# Patient Record
Sex: FEMALE | Race: OTHER | Employment: OTHER | URBAN - METROPOLITAN AREA
[De-identification: names, ages, dates, MRNs, and addresses within clinical notes are randomized per-mention and may not be internally consistent; named-entity substitution may affect disease eponyms.]

---

## 2017-10-16 ENCOUNTER — OFFICE VISIT (OUTPATIENT)
Dept: OBGYN CLINIC | Age: 62
End: 2017-10-16

## 2017-10-16 VITALS
SYSTOLIC BLOOD PRESSURE: 128 MMHG | BODY MASS INDEX: 26.33 KG/M2 | HEIGHT: 65 IN | WEIGHT: 158 LBS | DIASTOLIC BLOOD PRESSURE: 72 MMHG

## 2017-10-16 DIAGNOSIS — Z01.411 ENCOUNTER FOR GYNECOLOGICAL EXAMINATION (GENERAL) (ROUTINE) WITH ABNORMAL FINDINGS: Primary | ICD-10-CM

## 2017-10-16 DIAGNOSIS — N63.10 LUMP OF RIGHT BREAST: ICD-10-CM

## 2017-10-16 NOTE — PATIENT INSTRUCTIONS
Well Visit, Women 48 to 72: Care Instructions  Your Care Instructions  Physical exams can help you stay healthy. Your doctor has checked your overall health and may have suggested ways to take good care of yourself. He or she also may have recommended tests. At home, you can help prevent illness with healthy eating, regular exercise, and other steps. Follow-up care is a key part of your treatment and safety. Be sure to make and go to all appointments, and call your doctor if you are having problems. It's also a good idea to know your test results and keep a list of the medicines you take. How can you care for yourself at home? · Reach and stay at a healthy weight. This will lower your risk for many problems, such as obesity, diabetes, heart disease, and high blood pressure. · Get at least 30 minutes of exercise on most days of the week. Walking is a good choice. You also may want to do other activities, such as running, swimming, cycling, or playing tennis or team sports. · Do not smoke. Smoking can make health problems worse. If you need help quitting, talk to your doctor about stop-smoking programs and medicines. These can increase your chances of quitting for good. · Protect your skin from too much sun. When you're outdoors from 10 a.m. to 4 p.m., stay in the shade or cover up with clothing and a hat with a wide brim. Wear sunglasses that block UV rays. Even when it's cloudy, put broad-spectrum sunscreen (SPF 30 or higher) on any exposed skin. · See a dentist one or two times a year for checkups and to have your teeth cleaned. · Wear a seat belt in the car. · Limit alcohol to 1 drink a day. Too much alcohol can cause health problems. Follow your doctor's advice about when to have certain tests. These tests can spot problems early. · Cholesterol.  Your doctor will tell you how often to have this done based on your age, family history, or other things that can increase your risk for heart attack and stroke. · Blood pressure. Have your blood pressure checked during a routine doctor visit. Your doctor will tell you how often to check your blood pressure based on your age, your blood pressure results, and other factors. · Mammogram. Ask your doctor how often you should have a mammogram, which is an X-ray of your breasts. A mammogram can spot breast cancer before it can be felt and when it is easiest to treat. · Pap test and pelvic exam. Ask your doctor how often you should have a Pap test. You may not need to have a Pap test as often as you used to. · Vision. Have your eyes checked every year or two or as often as your doctor suggests. Some experts recommend that you have yearly exams for glaucoma and other age-related eye problems starting at age 48. · Hearing. Tell your doctor if you notice any change in your hearing. You can have tests to find out how well you hear. · Diabetes. Ask your doctor whether you should have tests for diabetes. · Colon cancer. You should begin tests for colon cancer at age 48. You may have one of several tests. Your doctor will tell you how often to have tests based on your age and risk. Risks include whether you already had a precancerous polyp removed from your colon or whether your parents, sisters and brothers, or children have had colon cancer. · Thyroid disease. Talk to your doctor about whether to have your thyroid checked as part of a regular physical exam. Women have an increased chance of a thyroid problem. · Osteoporosis. You should begin tests for bone density at age 72. If you are younger than 72, ask your doctor whether you have factors that may increase your risk for this disease. You may want to have this test before age 72. · Heart attack and stroke risk. At least every 4 to 6 years, you should have your risk for heart attack and stroke assessed.  Your doctor uses factors such as your age, blood pressure, cholesterol, and whether you smoke or have diabetes to show what your risk for a heart attack or stroke is over the next 10 years. When should you call for help? Watch closely for changes in your health, and be sure to contact your doctor if you have any problems or symptoms that concern you. Where can you learn more? Go to http://emma-renetta.info/. Enter R393 in the search box to learn more about \"Well Visit, Women 50 to 72: Care Instructions. \"  Current as of: July 19, 2016  Content Version: 11.3  © 3338-3944 [employer]. Care instructions adapted under license by [employer] (which disclaims liability or warranty for this information). If you have questions about a medical condition or this instruction, always ask your healthcare professional. [address] any warranty or liability for your use of this information.

## 2017-10-16 NOTE — PROGRESS NOTES
[facility]  [email or web address]  [phone]    Cong Dennis MD, [facility]       Annual Gynecologic Exam:   WWE 60+  Chief Complaint   Patient presents with    Well Woman         Wandy yLle is a 58 y.o. No obstetric history on file. BLACK OR   female who presents for an annual exam.    She does report additional concerns today. Patient states that she felt a right breast lump a few months ago, but has not felt anything since then. Sexual history and Contraception:  History   Sexual Activity    Sexual activity: Yes    Partners: Male    Birth control/ protection: Surgical     She does not reports new sexual partner(s) in the last year. Preventive Medicine History:  Her last annual GYN exam was about one year ago. Her most recent Pap smear result: normal was obtained in October 2015. Her most recent HR HPV screen was Negative obtained 2 year(s) ago. She does not have a history of STEVEN 2, 3 or cervical cancer. Breast health:  Last mammogram: approximate date 10/13/16 and was normal.   A mammogram was scheduled for today. Breast cancer family updated: see . Bone health: Angelica Miranda She has not had a bone density scan in the past.   Last bone density test results: Patient has never had a BDS. She does not have a history of osteopenia/osteoporosis. Osteoporosis family history updated: see .      Past Medical History:   Diagnosis Date    Bilateral dry eyes     Herpes simplex     Includes herpes labialis    History of mammogram 10/13/2016    negative    Hx of mammogram 09/30/14; 10/1/15    normal; negative    Hypertension     Hypertension     Pap smear for cervical cancer screening 9/14/10; 9/18/12; 10/1/15    Negative, HPV negative; Negative; Negative, HPV negative     Past Surgical History:   Procedure Laterality Date    HX COLONOSCOPY  2006    Wnl    HX GYN      tubal ligation    LAP,TUBAL CAUTERY       Family History   Problem Relation Age of Onset    Hypertension Mother     Arthritis-osteo Mother     Osteoporosis Mother     Hypertension Father     Hypertension Sister    Abilio Galaviz Arthritis-osteo Sister     Hypertension Brother      Social History     Social History    Marital status:      Spouse name: N/A    Number of children: N/A    Years of education: N/A     Occupational History    Not on file. Social History Main Topics    Smoking status: Never Smoker    Smokeless tobacco: Never Used    Alcohol use Yes      Comment: occasional wine    Drug use: No    Sexual activity: Yes     Partners: Male     Birth control/ protection: Surgical     Other Topics Concern    Not on file     Social History Narrative       Allergies   Allergen Reactions    Iodine Unknown (comments)       Current Outpatient Prescriptions   Medication Sig    candesartan-hydrochlorothiazide (ATACAND HCT) 16-12.5 mg per tablet Take 1 Tab by mouth daily. No current facility-administered medications for this visit. There is no problem list on file for this patient.       Review of Systems - History obtained from the patient  Constitutional: negative for weight loss, fever, night sweats  HEENT: negative for hearing loss, earache, congestion, snoring, sorethroat  CV: negative for chest pain, palpitations, edema  Resp: negative for cough, shortness of breath, wheezing  GI: negative for change in bowel habits, abdominal pain, black or bloody stools  : negative for frequency, dysuria, hematuria, vaginal discharge  MSK: negative for back pain, joint pain, muscle pain  Breast: see HPI  Skin :negative for itching, rash, hives  Neuro: negative for dizziness, headache, confusion, weakness  Psych: negative for anxiety, depression, change in mood  Heme/lymph: negative for bleeding, bruising, pallor    Physical Exam  Visit Vitals    /72    Ht 5' 5\" (1.651 m)    Wt 158 lb (71.7 kg)    BMI 26.29 kg/m2       Constitutional  · Appearance: well-nourished, well developed, alert, in no acute distress    HENT  · Head and Face: appears normal    Neck  · Inspection/Palpation: normal appearance, no masses or tenderness  · Lymph Nodes: no lymphadenopathy present  · Thyroid: gland size normal, nontender, no nodules or masses present on palpation    Chest  · Respiratory Effort: breathing unlabored  · Auscultation: normal breath sounds    Cardiovascular  · Heart:  · Auscultation: regular rate and rhythm without murmur    Breasts  · Inspection of Breasts: breasts symmetrical, no skin changes, no discharge present, nipple appearance normal, no skin retraction present  · Palpation of Breasts and Axillae:  Left: no masses present on palpation, no breast tenderness Right: small cystic mobile, nt masses, see image    ·   ·   · Axillary Lymph Nodes: no lymphadenopathy present    Gastrointestinal  · Abdominal Examination: abdomen non-tender to palpation, normal bowel sounds, no masses present  · Liver and spleen: no hepatomegaly present, spleen not palpable  · Hernias: no hernias identified    Genitourinary  · External Genitalia: normal appearance for age, no discharge present, no tenderness present, no inflammatory lesions present, no masses present, mild  atrophy present  · Vagina: normal vaginal vault without central or paravaginal defects, no discharge present, no inflammatory lesions present, no masses present  · Bladder: non-tender to palpation  · Urethra: appears normal  · Cervix: normal   · Uterus: normal size, shape and consistency  · Adnexa: no adnexal tenderness present, no adnexal masses present  · Perineum: perineum within normal limits, no evidence of trauma, no rashes or skin lesions present  · Anus: anus within normal limits, no hemorrhoids present  · Inguinal Lymph Nodes: no lymphadenopathy present    Skin  · General Inspection: no rash, no lesions identified    Neurologic/Psychiatric  · Mental Status:  · Orientation: grossly oriented to person, place and time  · Mood and Affect: mood normal, affect appropriate    Assessment:  58 y.o. No obstetric history on file. for well woman exam  Encounter Diagnoses   Name Primary?  Lump of right breast     Encounter for gynecological examination (general) (routine) with abnormal findings Yes       Plan:  The patient was counseled about diet, exercise, healthy lifestyle  We discussed current self breast exam and mammogram recommendations  We discussed current pap smear and HR HPV testing guidelines  We recommend follow up in one year for routine annual gynecologic exam or sooner if needed  We recommend follow up with a primary care physician for any chronic medical problems or non-gynecologic concerns    We discussed calcium/vitamin D/weight bearing exercise and osteoporosis prevention  Handouts were given to the patient   Disc breast fu, reviewed findings and plan with pt    [place] up:  [x] return for annual well woman exam in one year or sooner if she is having problems  [] follow up and ultrasound  [] mammogram  [] 6 months  [] 6 weeks   []     Orders Placed This Encounter    REFERRAL TO BREAST SURGERY       No results found for any visits on 10/16/17.

## 2017-10-16 NOTE — MR AVS SNAPSHOT
Visit Information Date & Time Provider Department Dept. Phone Encounter #  
 10/16/2017  3:20 PM Sybil Huerta MD [facility]  Upcoming Health Maintenance Date Due Hepatitis C Screening 1955 FOBT Q 1 YEAR AGE 50-75 7/21/2005 ZOSTER VACCINE AGE 60> 5/21/2015 INFLUENZA AGE 9 TO ADULT 8/1/2017 BREAST CANCER SCRN MAMMOGRAM 10/13/2017 PAP AKA CERVICAL CYTOLOGY 10/1/2018 Allergies as of 10/16/2017  Review Complete On: 10/16/2017 By: Vianney Chavez LPN Severity Noted Reaction Type Reactions Iodine  05/23/2013    Unknown (comments) Current Immunizations  Never Reviewed No immunizations on file. Not reviewed this visit Vitals BP Height(growth percentile) Weight(growth percentile) BMI OB Status Smoking Status 128/72 5' 5\" (1.651 m) 158 lb (71.7 kg) 26.29 kg/m2 Postmenopausal Never Smoker BMI and BSA Data Body Mass Index Body Surface Area  
 26.29 kg/m 2 1.81 m 2 Your Updated Medication List  
  
   
This list is accurate as of: 10/16/17  3:58 PM.  Always use your most recent med list.  
  
  
  
  
 ATACAND HCT 16-12.5 mg per tablet Generic drug:  candesartan-hydroCHLOROthiazide Take 1 Tab by mouth daily. RESTASIS 0.05 % ophthalmic emulsion Generic drug:  cycloSPORINE Administer 1 Drop to both eyes two (2) times a day. Patient Instructions Well Visit, Women 48 to 72: Care Instructions Your Care Instructions Physical exams can help you stay healthy. Your doctor has checked your overall health and may have suggested ways to take good care of yourself. He or she also may have recommended tests. At home, you can help prevent illness with healthy eating, regular exercise, and other steps. Follow-up care is a key part of your treatment and safety.  Be sure to make and go to all appointments, and call your doctor if you are having problems. It's also a good idea to know your test results and keep a list of the medicines you take. How can you care for yourself at home? · Reach and stay at a healthy weight. This will lower your risk for many problems, such as obesity, diabetes, heart disease, and high blood pressure. · Get at least 30 minutes of exercise on most days of the week. Walking is a good choice. You also may want to do other activities, such as running, swimming, cycling, or playing tennis or team sports. · Do not smoke. Smoking can make health problems worse. If you need help quitting, talk to your doctor about stop-smoking programs and medicines. These can increase your chances of quitting for good. · Protect your skin from too much sun. When you're outdoors from 10 a.m. to 4 p.m., stay in the shade or cover up with clothing and a hat with a wide brim. Wear sunglasses that block UV rays. Even when it's cloudy, put broad-spectrum sunscreen (SPF 30 or higher) on any exposed skin. · See a dentist one or two times a year for checkups and to have your teeth cleaned. · Wear a seat belt in the car. · Limit alcohol to 1 drink a day. Too much alcohol can cause health problems. Follow your doctor's advice about when to have certain tests. These tests can spot problems early. · Cholesterol. Your doctor will tell you how often to have this done based on your age, family history, or other things that can increase your risk for heart attack and stroke. · Blood pressure. Have your blood pressure checked during a routine doctor visit. Your doctor will tell you how often to check your blood pressure based on your age, your blood pressure results, and other factors. · Mammogram. Ask your doctor how often you should have a mammogram, which is an X-ray of your breasts. A mammogram can spot breast cancer before it can be felt and when it is easiest to treat.  
· Pap test and pelvic exam. Ask your doctor how often you should have a Pap test. You may not need to have a Pap test as often as you used to. · Vision. Have your eyes checked every year or two or as often as your doctor suggests. Some experts recommend that you have yearly exams for glaucoma and other age-related eye problems starting at age 48. · Hearing. Tell your doctor if you notice any change in your hearing. You can have tests to find out how well you hear. · Diabetes. Ask your doctor whether you should have tests for diabetes. · Colon cancer. You should begin tests for colon cancer at age 48. You may have one of several tests. Your doctor will tell you how often to have tests based on your age and risk. Risks include whether you already had a precancerous polyp removed from your colon or whether your parents, sisters and brothers, or children have had colon cancer. · Thyroid disease. Talk to your doctor about whether to have your thyroid checked as part of a regular physical exam. Women have an increased chance of a thyroid problem. · Osteoporosis. You should begin tests for bone density at age 72. If you are younger than 72, ask your doctor whether you have factors that may increase your risk for this disease. You may want to have this test before age 72. · Heart attack and stroke risk. At least every 4 to 6 years, you should have your risk for heart attack and stroke assessed. Your doctor uses factors such as your age, blood pressure, cholesterol, and whether you smoke or have diabetes to show what your risk for a heart attack or stroke is over the next 10 years. When should you call for help? Watch closely for changes in your health, and be sure to contact your doctor if you have any problems or symptoms that concern you. Where can you learn more? Go to http://emma-renetta.info/. Enter E019 in the search box to learn more about \"Well Visit, Women 50 to 72: Care Instructions. \" Current as of: July 19, 2016 Content Version: 11.3 © 0205-6963 [employer]wise, Incorporated. Care instructions adapted under license by [employer] (which disclaims liability or warranty for this information). If you have questions about a medical condition or this instruction, always ask your healthcare professional. [address] any warranty or liability for your use of this information. Introducing hospitals & HEALTH SERVICES! [employer] introduces [employer] patient portal. Now you can access parts of your medical record, email your doctor's office, and request medication refills online. 1. In your internet browser, go to [email or web address] 2. Click on the First Time User? Click Here link in the Sign In box. You will see the New Member Sign Up page. 3. Enter your [employer] Access Code exactly as it appears below. You will not need to use this code after youve completed the sign-up process. If you do not sign up before the expiration date, you must request a new code. · [employer] Access Code: [email or web address] Expires: 1/14/2018  3:58 PM 
 
4. Enter the last four digits of your Social Security Number (xxxx) and Date of Birth (mm/dd/yyyy) as indicated and click Submit. You will be taken to the next sign-up page. 5. Create a [employer] ID. This will be your [employer] login ID and cannot be changed, so think of one that is secure and easy to remember. 6. Create a [employer] password. You can change your password at any time. 7. Enter your Password Reset Question and Answer. This can be used at a later time if you forget your password. 8. Enter your e-mail address. You will receive e-mail notification when new information is available in [address] 9. Click Sign Up. You can now view and download portions of your medical record. 10. Click the Download Summary menu link to download a portable copy of your medical information.  
 
If you have questions, please visit the Frequently Asked Questions section of the [employer]. Remember, [employer] is NOT to be used for urgent needs. For medical emergencies, dial 911. Now available from your iPhone and Android! Please provide this summary of care documentation to your next provider. Your primary care clinician is listed as [address]. If you have any questions after today's visit, please call [phone].

## 2017-10-24 DIAGNOSIS — Z12.39 SCREENING BREAST EXAMINATION: Primary | ICD-10-CM

## 2017-10-31 ENCOUNTER — HOSPITAL ENCOUNTER (OUTPATIENT)
Dept: MAMMOGRAPHY | Age: 62
Discharge: HOME | End: 2017-10-31
Attending: SURGERY
Payer: OTHER GOVERNMENT

## 2017-10-31 ENCOUNTER — OFFICE VISIT (OUTPATIENT)
Dept: SURGERY | Age: 62
End: 2017-10-31

## 2017-10-31 VITALS
DIASTOLIC BLOOD PRESSURE: 73 MMHG | SYSTOLIC BLOOD PRESSURE: 125 MMHG | HEART RATE: 63 BPM | HEIGHT: 65 IN | BODY MASS INDEX: 26.57 KG/M2 | WEIGHT: 159.5 LBS

## 2017-10-31 DIAGNOSIS — Z12.39 SCREENING BREAST EXAMINATION: ICD-10-CM

## 2017-10-31 DIAGNOSIS — N63.20 BREAST MASS, LEFT: Primary | ICD-10-CM

## 2017-10-31 PROCEDURE — 77063 BREAST TOMOSYNTHESIS BI: CPT

## 2017-10-31 RX ORDER — TRIAMTERENE AND HYDROCHLOROTHIAZIDE 37.5; 25 MG/1; MG/1
CAPSULE ORAL DAILY
COMMUNITY
End: 2018-11-13 | Stop reason: SDUPTHER

## 2017-10-31 NOTE — MR AVS SNAPSHOT
Visit Information Date & Time Provider Department Dept. Phone Encounter #  
 10/31/2017  9:00 AM Vernon Arora [address] [facility] [phone] [id number] Upcoming Health Maintenance Date Due Hepatitis C Screening 1955 DTaP/Tdap/Td series (1 - Tdap) 7/21/1976 FOBT Q 1 YEAR AGE 50-75 7/21/2005 ZOSTER VACCINE AGE 60> 5/21/2015 INFLUENZA AGE 9 TO ADULT 8/1/2017 BREAST CANCER SCRN MAMMOGRAM 10/13/2017 PAP AKA CERVICAL CYTOLOGY 10/1/2018 Allergies as of 10/31/2017  Review Complete On: 10/31/2017 By: Vernon Arora MD  
  
 Severity Noted Reaction Type Reactions Iodine  05/23/2013    Unknown (comments) Sulfa (Sulfonamide Antibiotics)  10/31/2017    Unknown (comments) Current Immunizations  Never Reviewed No immunizations on file. Not reviewed this visit Vitals BP Pulse Height(growth percentile) Weight(growth percentile) BMI OB Status 125/73 63 5' 5\" (1.651 m) 159 lb 8 oz (72.3 kg) 26.54 kg/m2 Postmenopausal  
 Smoking Status Never Smoker Vitals History BMI and BSA Data Body Mass Index Body Surface Area  
 26.54 kg/m 2 1.82 m 2 Preferred Pharmacy Pharmacy Name Phone [facility] [address], [place] [address] [place] [address] [phone] Your Updated Medication List  
  
   
This list is accurate as of: 10/31/17  4:14 PM.  Always use your most recent med list.  
  
  
  
  
 triamterene-hydroCHLOROthiazide 37.5-25 mg per capsule Commonly known as:  Khadijah Root Take  by mouth daily. Patient Instructions Breast Lumps: Care Instructions Your Care Instructions Breast lumps are common, especially in women between ages 27 and 48. Many women's breasts feel lumpy and tender before their menstrual period. Women also may have lumps when they are breastfeeding.  Breast lumps may go away after menopause. All new breast lumps in women after menopause should be checked by a doctor. Although lumps may be normal for you, it is important to have your doctor check any lump or thickness that is not like the rest of your breast to make sure it is not cancer. A lump may be larger, harder, or different from the rest of your breast tissue. Follow-up care is a key part of your treatment and safety. Be sure to make and go to all appointments, and call your doctor if you are having problems. It's also a good idea to know your test results and keep a list of the medicines you take. How can you care for yourself at home? · Make an appointment to have a mammogram and other follow-up visits as recommended by your doctor. When should you call for help? Watch closely for changes in your health, and be sure to contact your doctor if: 
· You do not get better as expected. · Your breast has changed. · You have pain in your breast. 
· You have a discharge from your nipple. · A breast lump changes or does not go away. Where can you learn more? Go to http://emma-renteta.info/. Enter Q940 in the search box to learn more about \"Breast Lumps: Care Instructions. \" Current as of: October 13, 2016 Content Version: 11.4 © 1693-0224 [employer]. Care instructions adapted under license by [employer] (which disclaims liability or warranty for this information). If you have questions about a medical condition or this instruction, always ask your healthcare professional. [place] [address] any warranty or liability for your use of this information. Introducing [facility]! Sachi Shrestha introduces [employer] patient portal. Now you can access parts of your medical record, email your doctor's office, and request medication refills online. 1. In your internet browser, go to [email or web address] 2. Click on the First Time User? Click Here link in the Sign In box. You will see the New Member Sign Up page. 3. Enter your Your [employer] Matters Access Code exactly as it appears below. You will not need to use this code after youve completed the sign-up process. If you do not sign up before the expiration date, you must request a new code. · [employer] Access Code: [email or web address] Expires: 1/14/2018  3:58 PM 
 
4. Enter the last four digits of your Social Security Number (xxxx) and Date of Birth (mm/dd/yyyy) as indicated and click Submit. You will be taken to the next sign-up page. 5. Create a Your [employer] ID. This will be your Your [employer] login ID and cannot be changed, so think of one that is secure and easy to remember. 6. Create a [employer] password. You can change your password at any time. 7. Enter your Password Reset Question and Answer. This can be used at a later time if you forget your password. 8. Enter your e-mail address. You will receive e-mail notification when new information is available in [address] 9. Click Sign Up. You can now view and download portions of your medical record. 10. Click the Download Summary menu link to download a portable copy of your medical information. If you have questions, please visit the Frequently Asked Questions section of the [employer] website. Remember, [employer] is NOT to be used for urgent needs. For medical emergencies, dial 911. Now available from your iPhone and Android! Please provide this summary of care documentation to your next provider. Your primary care clinician is listed as [address]. If you have any questions after today's visit, please call [phone].

## 2017-10-31 NOTE — PROGRESS NOTES
HISTORY OF PRESENT ILLNESS  Marissa Guevara is a 58 y.o. female. HPI NEW patient referral for consultation by Dr. Gigi Myrick for a palpable lump in her RIGHT breast. The patient cannot feel the lump but her gyn MD felt something behind her RIGHT nipple. The patient felt a mass several months ago in the LEFT breast but cannot feel it any longer. She denies any nipple inversion or discharge. Obstetric History     No data available      Obstetric Comments   Menarche: 6. LMP:age 54  # of Children: 3  Age at Delivery of [address]. Hysterectomy/oophorectomy: no/no. Breast Bx: JUVENAL,0761 RIGHT  Hx of Breast Feeding: yes. BCP: no. Hormone therapy:  no    Family history - no breast or ovarian cancer  Mammogram - 3D 10/31/2017 - BIRADS 1  Review of Systems   Constitutional: Negative. HENT: Negative. Eyes: Negative. Respiratory: Negative. Cardiovascular: Negative. Gastrointestinal: Negative. Genitourinary: Negative. Musculoskeletal: Negative. Skin: Negative. Neurological: Negative. Endo/Heme/Allergies: Negative. Psychiatric/Behavioral: Negative. Physical Exam   Pulmonary/Chest: Right breast exhibits no mass, no nipple discharge, no skin change and no tenderness. Left breast exhibits no mass, no nipple discharge, no skin change and no tenderness. Breasts are symmetrical.   Lymphadenopathy:     She has no cervical adenopathy. She has no axillary adenopathy. Right: No supraclavicular adenopathy present. Left: No supraclavicular adenopathy present. BREAST ULTRASOUND  Indication: LEFT breast subareolar mass (felt by Dr Gigi Myrick)  Technique: The area was scanned using a high-frequency linear-array near-field transducer  Findings: 4 mm residual cysts identified on ultrasound  Impression: small residual cyst  Disposition: No worrisome finding on ultrasound  ASSESSMENT and PLAN    ICD-10-CM ICD-9-CM    1.  Breast mass, left N63.20 611.72      Pt likely had a larger cyst, no longer palpable. Her 3d mammogram today was normal, BIRADS 1. Continue yearly mammograms. Discussed self breast exam and explained what a noncancerous mass feels like (smooth, round and mobile) compared with a cancerous mass (hard, fixed, bumpy and often associated with a skin dimple).

## 2017-10-31 NOTE — PATIENT INSTRUCTIONS
Breast Lumps: Care Instructions  Your Care Instructions  Breast lumps are common, especially in women between ages 27 and 48. Many women's breasts feel lumpy and tender before their menstrual period. Women also may have lumps when they are breastfeeding. Breast lumps may go away after menopause. All new breast lumps in women after menopause should be checked by a doctor. Although lumps may be normal for you, it is important to have your doctor check any lump or thickness that is not like the rest of your breast to make sure it is not cancer. A lump may be larger, harder, or different from the rest of your breast tissue. Follow-up care is a key part of your treatment and safety. Be sure to make and go to all appointments, and call your doctor if you are having problems. It's also a good idea to know your test results and keep a list of the medicines you take. How can you care for yourself at home? · Make an appointment to have a mammogram and other follow-up visits as recommended by your doctor. When should you call for help? Watch closely for changes in your health, and be sure to contact your doctor if:  · You do not get better as expected. · Your breast has changed. · You have pain in your breast.  · You have a discharge from your nipple. · A breast lump changes or does not go away. Where can you learn more? Go to http://emma-renetta.info/. Enter B294 in the search box to learn more about \"Breast Lumps: Care Instructions. \"  Current as of: October 13, 2016  Content Version: 11.4  © 4556-7166 [employer]. Care instructions adapted under license by [employer] (which disclaims liability or warranty for this information). If you have questions about a medical condition or this instruction, always ask your healthcare professional. [address] any warranty or liability for your use of this information.

## 2017-10-31 NOTE — COMMUNICATION BODY
HISTORY OF PRESENT ILLNESS  Kirstin Chugn is a 58 y.o. female. HPI NEW patient referral for consultation by Dr. Tim Larsen for a palpable lump in her RIGHT breast. The patient cannot feel the lump but her gyn MD felt something behind her RIGHT nipple. The patient felt a mass several months ago in the LEFT breast but cannot feel it any longer. She denies any nipple inversion or discharge. Obstetric History     No data available      Obstetric Comments   Menarche: 6. LMP:age 54  # of Children: 3  Age at Delivery of [address]. Hysterectomy/oophorectomy: no/no. Breast Bx: CYB,8166 RIGHT  Hx of Breast Feeding: yes. BCP: no. Hormone therapy:  no    Family history - no breast or ovarian cancer  Mammogram - 3D 10/31/2017 - BIRADS 1  Review of Systems   Constitutional: Negative. HENT: Negative. Eyes: Negative. Respiratory: Negative. Cardiovascular: Negative. Gastrointestinal: Negative. Genitourinary: Negative. Musculoskeletal: Negative. Skin: Negative. Neurological: Negative. Endo/Heme/Allergies: Negative. Psychiatric/Behavioral: Negative. Physical Exam   Pulmonary/Chest: Right breast exhibits no mass, no nipple discharge, no skin change and no tenderness. Left breast exhibits no mass, no nipple discharge, no skin change and no tenderness. Breasts are symmetrical.   Lymphadenopathy:     She has no cervical adenopathy. She has no axillary adenopathy. Right: No supraclavicular adenopathy present. Left: No supraclavicular adenopathy present. BREAST ULTRASOUND  Indication: LEFT breast subareolar mass (felt by Dr Tim Larsen)  Technique: The area was scanned using a high-frequency linear-array near-field transducer  Findings: 4 mm residual cysts identified on ultrasound  Impression: small residual cyst  Disposition: No worrisome finding on ultrasound  ASSESSMENT and PLAN    ICD-10-CM ICD-9-CM    1.  Breast mass, left N63.20 611.72      Pt likely had a larger cyst, no longer palpable. Her 3d mammogram today was normal, BIRADS 1. Continue yearly mammograms. Discussed self breast exam and explained what a noncancerous mass feels like (smooth, round and mobile) compared with a cancerous mass (hard, fixed, bumpy and often associated with a skin dimple).

## 2018-11-13 ENCOUNTER — OFFICE VISIT (OUTPATIENT)
Dept: OBGYN CLINIC | Age: 63
End: 2018-11-13

## 2018-11-13 VITALS
DIASTOLIC BLOOD PRESSURE: 74 MMHG | BODY MASS INDEX: 27.32 KG/M2 | SYSTOLIC BLOOD PRESSURE: 136 MMHG | HEIGHT: 65 IN | WEIGHT: 164 LBS

## 2018-11-13 DIAGNOSIS — Z01.419 ENCOUNTER FOR GYNECOLOGICAL EXAMINATION: Primary | ICD-10-CM

## 2018-11-13 RX ORDER — TRIAMTERENE/HYDROCHLOROTHIAZID 37.5-25 MG
TABLET ORAL
Status: ON HOLD | COMMUNITY
Start: 2018-11-05 | End: 2020-10-21

## 2018-11-13 NOTE — PROGRESS NOTES
[address] [facility] 
[email or web address] 
[phone] Thanh Nielson MD, [address] [facility] Annual Gynecologic Exam:  
WWE 60+ Chief Complaint Patient presents with  Well Woman Nicki Willson is a 61 y.o. No obstetric history on file. OTHER 
female who presents for an annual exam. 
 
She does not report additional concerns today. Sexual history and Contraception: 
Social History Substance and Sexual Activity Sexual Activity Yes  Partners: Male  Birth control/protection: Surgical  
 
She does not reports new sexual partner(s) in the last year. Preventive Medicine History: 
Her most recent Pap smear result: normal was obtained in October 2015 Her most recent HR HPV screen was Negative obtained in 2015 She does not have a history of STEVEN 2, 3 or cervical cancer. Breast health: 
Last mammogram: was normal.  
A mammogram was scheduled for today. Breast cancer family updated: see FH. Bone health: Yovani Russell She has not had a bone density scan in the past.  
She does not have a history of osteopenia/osteoporosis. Osteoporosis family history updated: see FH. Past Medical History:  
Diagnosis Date  Bilateral dry eyes  Herpes simplex Includes herpes labialis  History of mammogram 10/13/2016  
 negative  Hx of mammogram 09/30/14; 10/1/15  
 normal; negative  Hypertension  Hypertension  Pap smear for cervical cancer screening 9/14/10; 9/18/12; 10/1/15 Negative, HPV negative; Negative; Negative, HPV negative Past Surgical History:  
Procedure Laterality Date  HX COLONOSCOPY  2006 Wnl  HX GYN    
 tubal ligation  LAP,TUBAL CAUTERY Family History Problem Relation Age of Onset  Hypertension Mother Hernandez Arthritis-osteo Mother  Osteoporosis Mother  Cancer Mother 80  
     lymphoma  Hypertension Father  Hypertension Sister  Arthritis-osteo Sister  Hypertension Brother Social History Socioeconomic History  Marital status:  Spouse name: Not on file  Number of children: Not on file  Years of education: Not on file  Highest education level: Not on file Social Needs  Financial resource strain: Not on file  Food insecurity - worry: Not on file  Food insecurity - inability: Not on file  Transportation needs - medical: Not on file  Transportation needs - non-medical: Not on file Occupational History  Not on file Tobacco Use  Smoking status: Never Smoker  Smokeless tobacco: Never Used Substance and Sexual Activity  Alcohol use: Yes Comment: occasional wine  Drug use: No  
 Sexual activity: Yes  
  Partners: Male Birth control/protection: Surgical  
Other Topics Concern  Not on file Social History Narrative  Not on file Allergies Allergen Reactions  Iodine Unknown (comments)  Sulfa (Sulfonamide Antibiotics) Unknown (comments) Current Outpatient Medications Medication Sig  
 triamterene-hydroCHLOROthiazide (MAXZIDE) 37.5-25 mg per tablet No current facility-administered medications for this visit. There is no problem list on file for this patient. Review of Systems - History obtained from the patient Constitutional: negative for weight loss, fever, night sweats HEENT: negative for hearing loss, earache, congestion, snoring, sorethroat CV: negative for chest pain, palpitations, edema Resp: negative for cough, shortness of breath, wheezing GI: negative for change in bowel habits, abdominal pain, black or bloody stools : negative for frequency, dysuria, hematuria, vaginal discharge MSK: negative for back pain, joint pain, muscle pain Breast: negative for breast lumps, nipple discharge, galactorrhea Skin :negative for itching, rash, hives Neuro: negative for dizziness, headache, confusion, weakness Psych: negative for anxiety, depression, change in mood Heme/lymph: negative for bleeding, bruising, pallor Physical Exam 
Visit Vitals /74 Ht 5' 5\" (1.651 m) Wt 164 lb (74.4 kg) BMI 27.29 kg/m² Constitutional 
· Appearance: well-nourished, well developed, alert, in no acute distress HENT 
· Head and Face: appears normal 
 
Neck · Inspection/Palpation: normal appearance, no masses or tenderness · Lymph Nodes: no lymphadenopathy present · Thyroid: gland size normal, nontender, no nodules or masses present on palpation Chest 
· Respiratory Effort: breathing unlabored · Auscultation: normal breath sounds Cardiovascular · Heart: 
· Auscultation: regular rate and rhythm without murmur Breasts · Inspection of Breasts: breasts symmetrical, no skin changes, no discharge present, nipple appearance normal, no skin retraction present · Palpation of Breasts and Axillae: no masses present on palpation, no breast tenderness · Axillary Lymph Nodes: no lymphadenopathy present Gastrointestinal 
· Abdominal Examination: abdomen non-tender to palpation, normal bowel sounds, no masses present · Liver and spleen: no hepatomegaly present, spleen not palpable · Hernias: no hernias identified Genitourinary · External Genitalia: normal appearance for age, no discharge present, no tenderness present, no inflammatory lesions present, no masses present, with mild atrophy present · Vagina: normal vaginal vault without central or paravaginal defects, no discharge present, no inflammatory lesions present, no masses present · Bladder: non-tender to palpation · Urethra: appears normal 
· Cervix: normal  
· Uterus: normal size, shape and consistency · Adnexa: no adnexal tenderness present, no adnexal masses present · Perineum: perineum within normal limits, no evidence of trauma, no rashes or skin lesions present · Anus: anus within normal limits, no hemorrhoids present · Inguinal Lymph Nodes: no lymphadenopathy present Skin · General Inspection: no rash, no lesions identified Neurologic/Psychiatric · Mental Status: · Orientation: grossly oriented to person, place and time · Mood and Affect: mood normal, affect appropriate Assessment: 
61 y.o. No obstetric history on file. for well woman exam 
Encounter Diagnosis Name Primary?  Encounter for gynecological examination Yes Plan: The patient was counseled about diet, exercise, healthy lifestyle We discussed current self breast exam and mammogram recommendations We discussed current pap smear and HR HPV testing guidelines We recommend follow up in one year for routine annual gynecologic exam or sooner if needed We recommend follow up with a primary care physician for any chronic medical problems or non-gynecologic concerns We discussed calcium/vitamin D/weight bearing exercise and osteoporosis prevention and bone density screening recommendations. Handouts were given to the patient Folllow up: 
[x] return for annual well woman exam in one year or sooner if she is having problems 
[] follow up and ultrasound [] mammogram 
[] 6 months 
[] 6 weeks [] Orders Placed This Encounter  PAP IG, HPV AND RFX HPV 51/36,96([id number]) Results for orders placed or performed in visit on 11/13/18 [facility] MAMMO BI SCREENING INCL CAD Narrative STUDY: Bilateral digital screening mammogram 
 
INDICATION:  Screening. COMPARISON:  [id number] BREAST COMPOSITION:  There are scattered areas of fibroglandular density. FINDINGS: Bilateral digital screening mammography was performed and is 
interpreted in conjunction with a computer assisted detection (CAD) system. No 
suspicious masses or calcifications are identified. There has been no 
significant change. Impression IMPRESSION: 
BI-RADS 1: Negative. No mammographic evidence of malignancy. RECOMMENDATIONS: 
Next screening mammogram is recommended in one year. The patient will be notified of these results.

## 2018-11-13 NOTE — PATIENT INSTRUCTIONS
Well Visit, Women 48 to 72: Care Instructions Your Care Instructions Physical exams can help you stay healthy. Your doctor has checked your overall health and may have suggested ways to take good care of yourself. He or she also may have recommended tests. At home, you can help prevent illness with healthy eating, regular exercise, and other steps. Follow-up care is a key part of your treatment and safety. Be sure to make and go to all appointments, and call your doctor if you are having problems. It's also a good idea to know your test results and keep a list of the medicines you take. How can you care for yourself at home? · Reach and stay at a healthy weight. This will lower your risk for many problems, such as obesity, diabetes, heart disease, and high blood pressure. · Get at least 30 minutes of exercise on most days of the week. Walking is a good choice. You also may want to do other activities, such as running, swimming, cycling, or playing tennis or team sports. · Do not smoke. Smoking can make health problems worse. If you need help quitting, talk to your doctor about stop-smoking programs and medicines. These can increase your chances of quitting for good. · Protect your skin from too much sun. When you're outdoors from 10 a.m. to 4 p.m., stay in the shade or cover up with clothing and a hat with a wide brim. Wear sunglasses that block UV rays. Even when it's cloudy, put broad-spectrum sunscreen (SPF 30 or higher) on any exposed skin. · See a dentist one or two times a year for checkups and to have your teeth cleaned. · Wear a seat belt in the car. · Limit alcohol to 1 drink a day. Too much alcohol can cause health problems. Follow your doctor's advice about when to have certain tests. These tests can spot problems early. · Cholesterol.  Your doctor will tell you how often to have this done based on your age, family history, or other things that can increase your risk for heart attack and stroke. · Blood pressure. Have your blood pressure checked during a routine doctor visit. Your doctor will tell you how often to check your blood pressure based on your age, your blood pressure results, and other factors. · Mammogram. Ask your doctor how often you should have a mammogram, which is an X-ray of your breasts. A mammogram can spot breast cancer before it can be felt and when it is easiest to treat. · Pap test and pelvic exam. Ask your doctor how often you should have a Pap test. You may not need to have a Pap test as often as you used to. · Vision. Have your eyes checked every year or two or as often as your doctor suggests. Some experts recommend that you have yearly exams for glaucoma and other age-related eye problems starting at age 48. · Hearing. Tell your doctor if you notice any change in your hearing. You can have tests to find out how well you hear. · Diabetes. Ask your doctor whether you should have tests for diabetes. · Colon cancer. You should begin tests for colon cancer at age 48. You may have one of several tests. Your doctor will tell you how often to have tests based on your age and risk. Risks include whether you already had a precancerous polyp removed from your colon or whether your parents, sisters and brothers, or children have had colon cancer. · Thyroid disease. Talk to your doctor about whether to have your thyroid checked as part of a regular physical exam. Women have an increased chance of a thyroid problem. · Osteoporosis. You should begin tests for bone density at age 72. If you are younger than 72, ask your doctor whether you have factors that may increase your risk for this disease. You may want to have this test before age 72. · Heart attack and stroke risk. At least every 4 to 6 years, you should have your risk for heart attack and stroke assessed.  Your doctor uses factors such as your age, blood pressure, cholesterol, and whether you smoke or have diabetes to show what your risk for a heart attack or stroke is over the next 10 years. When should you call for help? Watch closely for changes in your health, and be sure to contact your doctor if you have any problems or symptoms that concern you. Where can you learn more? Go to http://emma-renetta.info/. Enter X499 in the search box to learn more about \"Well Visit, Women 50 to 72: Care Instructions. \" Current as of: March 29, 2018 Content Version: 11.8 © 7912-1561 [employer]wise[employer]. Care instructions adapted under license by [employer] (which disclaims liability or warranty for this information). If you have questions about a medical condition or this instruction, always ask your healthcare professional. [address] any warranty or liability for your use of this information.

## 2018-11-15 LAB
CYTOLOGIST CVX/VAG CYTO: NORMAL
CYTOLOGY CVX/VAG DOC THIN PREP: NORMAL
CYTOLOGY HISTORY:: NORMAL
DX ICD CODE: NORMAL
HPV I/H RISK 1 DNA CVX QL PROBE+SIG AMP: NEGATIVE
Lab: NORMAL
OTHER STN SPEC: NORMAL
PATH REPORT.FINAL DX SPEC: NORMAL
STAT OF ADQ CVX/VAG CYTO-IMP: NORMAL

## 2018-11-30 RX ORDER — GLUCOSAMINE/CHONDR SU A SOD 750-600 MG
1 TABLET ORAL DAILY
COMMUNITY
End: 2020-10-23

## 2018-11-30 RX ORDER — MULTIVIT WITH MINERALS/HERBS
1 TABLET ORAL DAILY
Status: ON HOLD | COMMUNITY
End: 2020-10-21

## 2018-11-30 NOTE — PERIOP NOTES
[address] [facility] Preprocedure Instructions 1. On the day of your surgery, please report to registration located on the 2nd floor of the  [facility]. yes 2. You must have a responsible adult to drive you to the hospital, stay at the hospital during your procedure and drive you home. If they leave your procedure will not be started (no exceptions). yes 3. Do not have anything to eat or drink (including water, gum, mints, coffee, and juice) after midnight. This does not apply to the medications you were instructed to take by your physician. yesIf you are currently taking Plavix, Coumadin, Aspirin, or other blood-thinning agents, contact your physician for special instructions. No 
 
4. If you are having a procedure that requires bowel prep: We recommend that you have only clear liquids the day before your procedure and begin your bowel prep by 5:00 pm.  You may continue to drink clear liquids until midnight. If for any reason you are not able to complete your prep please notify your physician immediately. yes 5. Have a list of all current medications, including vitamins, herbal supplements and any other over the counter medications. yes 6. If you wear glasses, contacts, dentures and/or hearing aids, they may be removed prior to procedure, please bring a case to store them in. yes 7. You should understand that if you do not follow these instructions your procedure may be cancelled. If your physical condition changes (I.e. fever, cold or flu) please contact your doctor as soon as possible. 8. It is important that you be on time. If for any reason you are unable to keep your appointment please call )- the day of or your physicians office prior to your scheduled procedure

## 2018-12-04 NOTE — H&P
61 y.o. female for open access colonoscopy for screening   Additional data for completion of the targeted pre-endoscopy H&P will be provided under 'H&P interval notes'. Please see that document which will be attached to this. Merlynn Fabry, MD 
Family history Last colon 5 years.

## 2018-12-05 ENCOUNTER — ANESTHESIA (OUTPATIENT)
Dept: ENDOSCOPY | Age: 63
End: 2018-12-05
Payer: OTHER GOVERNMENT

## 2018-12-05 ENCOUNTER — HOSPITAL ENCOUNTER (OUTPATIENT)
Age: 63
Setting detail: OUTPATIENT SURGERY
Discharge: HOME | End: 2018-12-05
Attending: SPECIALIST | Admitting: SPECIALIST
Payer: OTHER GOVERNMENT

## 2018-12-05 ENCOUNTER — ANESTHESIA EVENT (OUTPATIENT)
Dept: ENDOSCOPY | Age: 63
End: 2018-12-05
Payer: OTHER GOVERNMENT

## 2018-12-05 VITALS
TEMPERATURE: 97.7 F | RESPIRATION RATE: 13 BRPM | BODY MASS INDEX: 25.07 KG/M2 | SYSTOLIC BLOOD PRESSURE: 110 MMHG | HEART RATE: 62 BPM | DIASTOLIC BLOOD PRESSURE: 62 MMHG | HEIGHT: 66 IN | OXYGEN SATURATION: 100 % | WEIGHT: 156 LBS

## 2018-12-05 PROCEDURE — 77030013992 HC SNR POLYP ENDOSC BSC -B: Performed by: SPECIALIST

## 2018-12-05 PROCEDURE — 74011250636 HC RX REV CODE- 250/636: Performed by: PHYSICIAN ASSISTANT

## 2018-12-05 PROCEDURE — 74011250636 HC RX REV CODE- 250/636

## 2018-12-05 PROCEDURE — 76040000019: Performed by: SPECIALIST

## 2018-12-05 PROCEDURE — 76060000031 HC ANESTHESIA FIRST 0.5 HR: Performed by: SPECIALIST

## 2018-12-05 PROCEDURE — 88305 TISSUE EXAM BY PATHOLOGIST: CPT

## 2018-12-05 RX ORDER — PROPOFOL 10 MG/ML
INJECTION, EMULSION INTRAVENOUS
Status: DISCONTINUED | OUTPATIENT
Start: 2018-12-05 | End: 2018-12-05 | Stop reason: HOSPADM

## 2018-12-05 RX ORDER — FLUMAZENIL 0.1 MG/ML
0.2 INJECTION INTRAVENOUS
Status: DISCONTINUED | OUTPATIENT
Start: 2018-12-05 | End: 2018-12-05 | Stop reason: HOSPADM

## 2018-12-05 RX ORDER — PROPOFOL 10 MG/ML
INJECTION, EMULSION INTRAVENOUS AS NEEDED
Status: DISCONTINUED | OUTPATIENT
Start: 2018-12-05 | End: 2018-12-05 | Stop reason: HOSPADM

## 2018-12-05 RX ORDER — FENTANYL CITRATE 50 UG/ML
25 INJECTION, SOLUTION INTRAMUSCULAR; INTRAVENOUS AS NEEDED
Status: DISCONTINUED | OUTPATIENT
Start: 2018-12-05 | End: 2018-12-05 | Stop reason: HOSPADM

## 2018-12-05 RX ORDER — DEXTROMETHORPHAN/PSEUDOEPHED 2.5-7.5/.8
1.2 DROPS ORAL
Status: DISCONTINUED | OUTPATIENT
Start: 2018-12-05 | End: 2018-12-05 | Stop reason: HOSPADM

## 2018-12-05 RX ORDER — EPINEPHRINE 0.1 MG/ML
1 INJECTION INTRACARDIAC; INTRAVENOUS
Status: DISCONTINUED | OUTPATIENT
Start: 2018-12-05 | End: 2018-12-05 | Stop reason: HOSPADM

## 2018-12-05 RX ORDER — SODIUM CHLORIDE 9 MG/ML
INJECTION, SOLUTION INTRAVENOUS
Status: DISCONTINUED | OUTPATIENT
Start: 2018-12-05 | End: 2018-12-05 | Stop reason: HOSPADM

## 2018-12-05 RX ORDER — BISMUTH SUBSALICYLATE 262 MG
1 TABLET,CHEWABLE ORAL DAILY
COMMUNITY
End: 2020-10-23

## 2018-12-05 RX ORDER — SODIUM CHLORIDE 9 MG/ML
50 INJECTION, SOLUTION INTRAVENOUS CONTINUOUS
Status: DISCONTINUED | OUTPATIENT
Start: 2018-12-05 | End: 2018-12-05 | Stop reason: HOSPADM

## 2018-12-05 RX ORDER — MIDAZOLAM HYDROCHLORIDE 1 MG/ML
.25-5 INJECTION, SOLUTION INTRAMUSCULAR; INTRAVENOUS AS NEEDED
Status: DISCONTINUED | OUTPATIENT
Start: 2018-12-05 | End: 2018-12-05 | Stop reason: HOSPADM

## 2018-12-05 RX ORDER — ATROPINE SULFATE 0.1 MG/ML
0.5 INJECTION INTRAVENOUS
Status: DISCONTINUED | OUTPATIENT
Start: 2018-12-05 | End: 2018-12-05 | Stop reason: HOSPADM

## 2018-12-05 RX ORDER — NALOXONE HYDROCHLORIDE 0.4 MG/ML
0.4 INJECTION, SOLUTION INTRAMUSCULAR; INTRAVENOUS; SUBCUTANEOUS
Status: DISCONTINUED | OUTPATIENT
Start: 2018-12-05 | End: 2018-12-05 | Stop reason: HOSPADM

## 2018-12-05 RX ORDER — LIDOCAINE HYDROCHLORIDE 20 MG/ML
INJECTION, SOLUTION EPIDURAL; INFILTRATION; INTRACAUDAL; PERINEURAL AS NEEDED
Status: DISCONTINUED | OUTPATIENT
Start: 2018-12-05 | End: 2018-12-05 | Stop reason: HOSPADM

## 2018-12-05 RX ADMIN — PROPOFOL 50 MG: 10 INJECTION, EMULSION INTRAVENOUS at 07:30

## 2018-12-05 RX ADMIN — LIDOCAINE HYDROCHLORIDE 20 MG: 20 INJECTION, SOLUTION EPIDURAL; INFILTRATION; INTRACAUDAL; PERINEURAL at 07:30

## 2018-12-05 RX ADMIN — PROPOFOL 10 MG: 10 INJECTION, EMULSION INTRAVENOUS at 07:31

## 2018-12-05 RX ADMIN — SODIUM CHLORIDE: 9 INJECTION, SOLUTION INTRAVENOUS at 07:02

## 2018-12-05 RX ADMIN — PROPOFOL 140 MCG/KG/MIN: 10 INJECTION, EMULSION INTRAVENOUS at 07:30

## 2018-12-05 RX ADMIN — SODIUM CHLORIDE 50 ML/HR: 900 INJECTION, SOLUTION INTRAVENOUS at 07:13

## 2018-12-05 NOTE — PERIOP NOTES
0730 
Anesthesia staff at patient's bedside administering anesthesia and monitoring patients vital signs throughout procedure. See anesthesia note. 46 Endoscope was pre-cleaned at bedside immediately following procedure by Usha Oliver RN.  
 
0862 Patient tolerated procedure without problems. Abdomen soft and patient arousable and voices no complaints Report received from CRNA, see anesthesia note. Patient transported to endoscopy recovery area. Report given to Elena David RN.

## 2018-12-05 NOTE — ANESTHESIA PREPROCEDURE EVALUATION
Anesthetic History No history of anesthetic complications Review of Systems / Medical History Patient summary reviewed, nursing notes reviewed and pertinent labs reviewed Pulmonary Within defined limits Neuro/Psych Within defined limits Cardiovascular Hypertension Exercise tolerance: >4 METS 
  
GI/Hepatic/Renal 
Within defined limits Endo/Other Within defined limits Other Findings Physical Exam 
 
Airway Mallampati: III 
TM Distance: 4 - 6 cm Neck ROM: normal range of motion Mouth opening: Normal 
 
 Cardiovascular Rhythm: regular Rate: normal 
 
 
 
 Dental 
 
Dentition: Lower dentition intact and Upper dentition intact Pulmonary Breath sounds clear to auscultation Abdominal 
GI exam deferred Other Findings Anesthetic Plan ASA: 2 Anesthesia type: MAC Induction: Intravenous Anesthetic plan and risks discussed with: Patient

## 2018-12-05 NOTE — PROCEDURES
[address] [facility] LETHA Tilley MD  [phone]      2018    Colonoscopy Procedure Note  [facility]  :  1955  [place] Medical Record Number: [id number]    Indications:     Family history of coloretal cancer (screening only), Screening colonoscopy  PCP:  Naresh Mims MD  Anesthesia/Sedation: Conscious Sedation/Moderate Sedation/GETA, see notes  Endoscopist:  Dr. Regan Thomas  Complications:  None  Estimated Blood Loss:  None    Permit:  The indications, risks, benefits and alternatives were reviewed with the patient or their decision maker who was provided an opportunity to ask questions and all questions were answered. The specific risks of colonoscopy with conscious sedation were reviewed, including but not limited to anesthetic complication, bleeding, adverse drug reaction, missed lesion, infection, IV site reactions, and intestinal perforation which would lead to the need for surgical repair. Alternatives to colonoscopy including radiographic imaging, observation without testing, or laboratory testing were reviewed including the limitations of those alternatives. After considering the options and having all their questions answered, the patient or their decision maker provided both verbal and written consent to proceed. Procedure in Detail:  After obtaining informed consent, positioning of the patient in the left lateral decubitus position, and conduction of a pre-procedure pause or \"time out\" the endoscope was introduced into the anus and advanced to the cecum, which was identified by the ileocecal valve and appendiceal orifice. The quality of the colonic preparation was good. A careful inspection was made as the colonoscope was withdrawn, findings and interventions are described below.     Findings:   9mm sessile polyp with mucus cap removed with cold snare and all samples retrieved. There is diverticulosis in the sigmoid colon without complications such as bleeding, inflammatory change, or luminal narrowing. Specimens:     See above    Complications:   None; patient tolerated the procedure well. Impression:  Colon polyp    Recommendations:     - Await pathology. Thank you for entrusting me with this patient's care. Please do not hesitate to contact me with any questions or if I can be of assistance with any of your other patients' GI needs.     Signed By: Lidia Zamora MD                        December 5, 2018      Surgical assistant none

## 2018-12-05 NOTE — ANESTHESIA POSTPROCEDURE EVALUATION
Procedure(s): 
COLONOSCOPY 
ENDOSCOPIC POLYPECTOMY. Anesthesia Post Evaluation Patient location during evaluation: PACU Level of consciousness: awake Pain management: adequate Airway patency: patent Anesthetic complications: no 
Cardiovascular status: acceptable Respiratory status: acceptable Hydration status: acceptable Visit Vitals /62 Pulse 62 Temp 36.5 °C (97.7 °F) Resp 13 Ht 5' 5.5\" (1.664 m) Wt 70.8 kg (156 lb) SpO2 100% Breastfeeding? No  
BMI 25.56 kg/m²

## 2018-12-05 NOTE — INTERVAL H&P NOTE
Pre-Endoscopy H&P Update Chief complaint/HPI/ROS:  The indication for the procedure, the patient's history and the patient's current medications are reviewed prior to the procedure and that data is reported on the H&P to which this document is attached. Any significant complaints with regard to organ systems will be noted, and if not mentioned then a review of systems is not contributory. Past Medical History:  
Diagnosis Date  Bilateral dry eyes  Herpes simplex Includes herpes labialis  History of mammogram 10/13/2016; 11/13/18  
 negative; Negative  Hx of mammogram 09/30/14; 10/1/15  
 normal; negative  Hypertension  Hypertension  Pap smear for cervical cancer screening 9/14/10; 9/18/12; 10/1/15; 11/13/18 Negative, HPV negative; Negative; Negative, HPV negative; Neg/Neg HPV Past Surgical History:  
Procedure Laterality Date  HX COLONOSCOPY  2006 Wnl  HX GYN  [id number]  
 tubal ligation  LAP,TUBAL CAUTERY Social  
Social History Tobacco Use  Smoking status: Never Smoker  Smokeless tobacco: Never Used Substance Use Topics  Alcohol use: Yes Alcohol/week: 3.0 oz Types: 5 Glasses of wine per week Comment: occasional wine Family History Problem Relation Age of Onset  Hypertension Mother [facility] Arthritis-osteo Mother  Osteoporosis Mother  Cancer Mother 80  
     lymphoma  Hypertension Father  Hypertension Sister  Arthritis-osteo Sister  Hypertension Brother  Cancer Maternal Uncle Allergies Allergen Reactions  Sulfa (Sulfonamide Antibiotics) Unknown (comments) Pt denies  Iodine Itching Prior to Admission Medications Prescriptions Last Dose Informant Patient Reported? Taking? Biotin 2,500 mcg cap 12/3/2018  Yes Yes Sig: Take  by mouth.  
b complex vitamins tablet 12/3/2018  Yes Yes Sig: Take 1 Tab by mouth daily. multivitamin (ONE A DAY) tablet 12/3/2018  Yes Yes Sig: Take 1 Tab by mouth daily. omega 3-dha-epa-fish oil (FISH OIL) 100-160-1,000 mg cap 12/3/2018  Yes Yes Sig: Take  by mouth.  
triamterene-hydroCHLOROthiazide (MAXZIDE) 37.5-25 mg per tablet 12/4/2018 at Unknown time  Yes Yes  
vit C/E/Zn/coppr/lutein/zeaxan (PRESERVISION AREDS-2 PO) 12/3/2018  Yes Yes Sig: Take  by mouth. Facility-Administered Medications: None PHYSICAL EXAM:  The patient is examined immediately prior to the procedure. Visit Vitals /69 Pulse 65 Temp 98.1 °F (36.7 °C) Resp 9 Ht 5' 5.5\" (1.664 m) Wt 70.8 kg (156 lb) SpO2 99% Breastfeeding? No  
BMI 25.56 kg/m² Gen: Appears comfortable, no distress. Pulm: comfortable respirations with no abnormal audible breath sounds HEART: well perfused, no abnormal audible heart sounds GI: abdomen flat. PLAN:  Informed consent discussion held, patient afforded an opportunity to ask questions and all questions answered. After being advised of the risks, benefits, and alternatives, the patient requested that we proceed and indicated so on a written consent form. Will proceed with procedure as planned.  
Jenae Jaeger MD

## 2018-12-05 NOTE — ROUTINE PROCESS
Nicki Willson 1955 
[id number] Situation: 
Verbal report received from: Wilian Ricci RN Procedure: Procedure(s): 
COLONOSCOPY 
ENDOSCOPIC POLYPECTOMY Background: 
 
Preoperative diagnosis: FAMILY HX OF MALIGNANT NEOPLASM OF GASTROINTESTINAL TRACT Postoperative diagnosis: Diverticulosis, polyp. :  Dr. Horace Lagos Assistant(s): Endoscopy RN-1: Kendrick Orlando RN; Dereck Messina RN Specimens:  
ID Type Source Tests Collected by Time Destination 1 : ascending colon polyp Preservative Colon, Ascending  Shy Castillo MD 12/5/2018 [address] [facility] H. Pylori  no Assessment: 
Intra-procedure medications Anesthesia gave intra-procedure sedation and medications, see anesthesia flow sheet yes Intravenous fluids: NS@ [facility] Vital signs stable Abdominal assessment: round and soft Recommendation: 
Discharge patient per MD order. Family or Friend Permission to share finding with family or friend yes

## 2020-01-03 ENCOUNTER — OFFICE VISIT (OUTPATIENT)
Dept: OBGYN CLINIC | Age: 65
End: 2020-01-03

## 2020-01-03 VITALS
SYSTOLIC BLOOD PRESSURE: 116 MMHG | WEIGHT: 168 LBS | DIASTOLIC BLOOD PRESSURE: 58 MMHG | HEIGHT: 66 IN | BODY MASS INDEX: 27 KG/M2

## 2020-01-03 DIAGNOSIS — R39.15 URINARY URGENCY: ICD-10-CM

## 2020-01-03 DIAGNOSIS — L29.9 PRURITUS: ICD-10-CM

## 2020-01-03 DIAGNOSIS — Z01.411 ENCOUNTER FOR GYNECOLOGICAL EXAMINATION (GENERAL) (ROUTINE) WITH ABNORMAL FINDINGS: Primary | ICD-10-CM

## 2020-01-03 RX ORDER — LOSARTAN POTASSIUM 100 MG/1
TABLET ORAL
Status: ON HOLD | COMMUNITY
Start: 2019-12-20 | End: 2020-10-21

## 2020-01-03 NOTE — PROGRESS NOTES
[address] [facility]  [email or web address]  [phone]    Elias Saeed MD, [address] [facility]       Annual Gynecologic Exam:   WWE 60+  Chief Complaint   Patient presents with    Well Woman    Other     left breast itching         Saige Ortiz is a 59 y.o. No obstetric history on file. OTHER  female who presents for an annual exam.    She does report additional concerns today. She reports left breast itching that started 1 month ago. She also reports some occasional urinary urgency if she drinks to many liquids through out the day. Started at nipple area, stopped and then recurred. +urinary urgency, has spasms before can get to bathroom, occ leaks before gets to toilet. Sexual history and Contraception:  Social History     Substance and Sexual Activity   Sexual Activity Yes    Partners: Male    Birth control/protection: Surgical     She does not reports new sexual partner(s) in the last year. Preventive Medicine History:  Her most recent Pap smear result: normal was obtained in November 2018  Her most recent HR HPV screen was Negative obtained in 2018    She does not have a history of STEVEN 2, 3 or cervical cancer. Breast health:  Last mammogram: was normal.   A mammogram was scheduled for today. Breast cancer family updated: see . Bone health: Whitney Hebert She has had a bone density scan in the past - per patient over 5 years ago and was normal (PCP takes care of this). Last bone density test results: was normal per patient. She does not have a history of osteopenia/osteoporosis. Osteoporosis family history updated: see .      Past Medical History:   Diagnosis Date    Bilateral dry eyes     Herpes simplex     Includes herpes labialis    History of mammogram 10/13/2016; 11/13/18    negative; Negative    Hx of mammogram 09/30/14; 10/1/15    normal; negative    Hypertension     Hypertension     Pap smear for cervical cancer screening 9/14/10; 9/18/12; 10/1/15; 11/13/18    Negative, HPV negative; Negative; Negative, HPV negative; Neg/Neg HPV     Past Surgical History:   Procedure Laterality Date    COLONOSCOPY N/A 12/5/2018    COLONOSCOPY performed by Johnathan Goodwin MD at [address] [facility] HX COLONOSCOPY  2006    Wnl    HX GYN  1982    tubal ligation    LAP,TUBAL CAUTERY       Family History   Problem Relation Age of Onset    Hypertension Mother     Arthritis-osteo Mother     Osteoporosis Mother     Cancer Mother 80        lymphoma    Hypertension Father     Hypertension Sister     Arthritis-osteo Sister     Hypertension Brother     Cancer Maternal Uncle      Social History     Socioeconomic History    Marital status:      Spouse name: Not on file    Number of children: Not on file    Years of education: Not on file    Highest education level: Not on file   Occupational History    Not on file   Social Needs    Financial resource strain: Not on file    Food insecurity:     Worry: Not on file     Inability: Not on file    Transportation needs:     Medical: Not on file     Non-medical: Not on file   Tobacco Use    Smoking status: Never Smoker    Smokeless tobacco: Never Used   Substance and Sexual Activity    Alcohol use:  Yes     Alcohol/week: 5.0 standard drinks     Types: 5 Glasses of wine per week     Comment: occasional wine    Drug use: No    Sexual activity: Yes     Partners: Male     Birth control/protection: Surgical   Lifestyle    Physical activity:     Days per week: Not on file     Minutes per session: Not on file    Stress: Not on file   Relationships    Social connections:     Talks on phone: Not on file     Gets together: Not on file     Attends [employer] service: Not on file     Active member of club or organization: Not on file     Attends meetings of clubs or organizations: Not on file     Relationship status: Not on file    Intimate partner violence:     Fear of current or ex partner: Not on file     Emotionally abused: Not on file     Physically abused: Not on file     Forced sexual activity: Not on file   Other Topics Concern    Not on file   Social History Narrative    Not on file       Allergies   Allergen Reactions    Sulfa (Sulfonamide Antibiotics) Unknown (comments)     Pt denies    Iodine Itching       Current Outpatient Medications   Medication Sig    losartan (COZAAR) 100 mg tablet TAKE 1 TABLET BY MOUTH ONCE DAILY FOR 90 DAYS    multivitamin (ONE A DAY) tablet Take 1 Tab by mouth daily.  b complex vitamins tablet Take 1 Tab by mouth daily.  omega 3-dha-epa-fish oil (FISH OIL) 100-160-1,000 mg cap Take  by mouth.  Biotin 2,500 mcg cap Take  by mouth.  vit C/E/Zn/coppr/lutein/zeaxan (PRESERVISION AREDS-2 PO) Take  by mouth.  triamterene-hydroCHLOROthiazide (MAXZIDE) 37.5-25 mg per tablet      No current facility-administered medications for this visit. There is no problem list on file for this patient.       Review of Systems - History obtained from the patient  Constitutional: negative for weight loss, fever, night sweats  HEENT: negative for hearing loss, earache, congestion, snoring, sorethroat  CV: negative for chest pain, palpitations, edema  Resp: negative for cough, shortness of breath, wheezing  GI: negative for change in bowel habits, abdominal pain, black or bloody stools  : negative for frequency, dysuria, hematuria, vaginal discharge  MSK: negative for back pain, joint pain, muscle pain  Breast: negative for breast lumps, nipple discharge, galactorrhea  Skin :see HPI  Neuro: negative for dizziness, headache, confusion, weakness  Psych: negative for anxiety, depression, change in mood  Heme/lymph: negative for bleeding, bruising, pallor    (WWE continued)    Physical Exam  Visit Vitals  /58   Ht 5' 5.5\" (1.664 m)   Wt 168 lb (76.2 kg)   BMI 27.53 kg/m²       Constitutional  · Appearance: well-nourished, well developed, alert, in no acute distress    HENT  · Head and Face: appears normal    Neck  · Inspection/Palpation: normal appearance, no masses or tenderness  · Lymph Nodes: no lymphadenopathy present  · Thyroid: gland size normal, nontender, no nodules or masses present on palpation    Chest  · Respiratory Effort: breathing unlabored  · Auscultation: normal breath sounds    Cardiovascular  · Heart:  · Auscultation: regular rate and rhythm without murmur    Breasts  · Inspection of Breasts: breasts symmetrical, no discharge present, nipple appearance normal, no skin retraction present, tiny papule left UIQ, no other skin changes. · Palpation of Breasts and Axillae: no masses present on palpation, no breast tenderness  · Axillary Lymph Nodes: no lymphadenopathy present    Gastrointestinal  · Abdominal Examination: abdomen non-tender to palpation, normal bowel sounds, no masses present  · Liver and spleen: no hepatomegaly present, spleen not palpable  · Hernias: no hernias identified    Genitourinary  · External Genitalia: normal appearance for age, no discharge present, no tenderness present, no inflammatory lesions present, no masses present, with atrophy present  · Vagina: normal vaginal vault without central or paravaginal defects, no discharge present, no inflammatory lesions present, no masses present  · Bladder: non-tender to palpation  · Urethra: appears normal  · Cervix: normal   · Uterus: normal size, shape and consistency  · Adnexa: no adnexal tenderness present, no adnexal masses present  · Perineum: perineum within normal limits, no evidence of trauma, no rashes or skin lesions present  · Anus: anus within normal limits, no hemorrhoids present  · Inguinal Lymph Nodes: no lymphadenopathy present    Skin  · General Inspection: no rash, no lesions identified    Neurologic/Psychiatric  · Mental Status:  · Orientation: grossly oriented to person, place and time  · Mood and Affect: mood normal, affect appropriate    Assessment:  59 y.o. No obstetric history on file. for well woman exam  Encounter Diagnoses   Name Primary?  Encounter for gynecological examination (general) (routine) with abnormal findings Yes    Pruritus     Urinary urgency        Plan:  The patient was counseled about diet, exercise, healthy lifestyle  We discussed current self breast exam and mammogram recommendations  We discussed current pap smear and HR HPV testing guidelines  We recommend follow up in one year for routine annual gynecologic exam or sooner if needed  We recommend follow up with a primary care physician for any chronic medical problems or non-gynecologic concerns    We discussed calcium/vitamin D/weight bearing exercise and osteoporosis prevention and bone density screening recommendations. Handouts were given to the patient  Bladder diet h/o, pt declined ur sample  Disc pelvic floor exercises  Disc topical txt for breast; Notify MD if NI  Topical hydrocortisone notify MD if NI in pruritis  Disc good voiding habits, disc options if NI/medications/PT/UDT     Folllow up:  [x] return for annual well woman exam in one year or sooner if she is having problems  [] follow up and ultrasound  [] mammogram  [] 6 months  [] 6 weeks   []     No orders of the defined types were placed in this encounter. Results for orders placed or performed in visit on 01/03/20   [facility] MAMMO BI SCREENING INCL CAD    Narrative    STUDY: Bilateral digital screening mammogram    INDICATION:  Screening. COMPARISON: [id number]    BREAST COMPOSITION:  There are scattered areas of fibroglandular density. FINDINGS: Bilateral digital screening mammography was performed and is  interpreted in conjunction with a computer assisted detection (CAD) system. No  suspicious masses or calcifications are identified. There has been no  significant change. Impression    IMPRESSION:  BI-RADS 1: Negative. No mammographic evidence of malignancy. RECOMMENDATIONS:  Next screening mammogram is recommended in one year. The patient will be notified of these results.

## 2020-01-03 NOTE — PATIENT INSTRUCTIONS

## 2020-10-20 ENCOUNTER — HOSPITAL ENCOUNTER (INPATIENT)
Age: 65
LOS: 2 days | Discharge: HOME | DRG: 442 | End: 2020-10-23
Attending: EMERGENCY MEDICINE | Admitting: FAMILY MEDICINE
Payer: MEDICARE

## 2020-10-20 ENCOUNTER — APPOINTMENT (OUTPATIENT)
Dept: CT IMAGING | Age: 65
DRG: 442 | End: 2020-10-20
Attending: EMERGENCY MEDICINE
Payer: MEDICARE

## 2020-10-20 DIAGNOSIS — R17 PAINLESS JAUNDICE: Primary | ICD-10-CM

## 2020-10-20 DIAGNOSIS — E80.6 HYPERBILIRUBINEMIA: ICD-10-CM

## 2020-10-20 DIAGNOSIS — R79.89 ELEVATED FERRITIN: ICD-10-CM

## 2020-10-20 DIAGNOSIS — I10 ESSENTIAL HYPERTENSION: ICD-10-CM

## 2020-10-20 DIAGNOSIS — I50.21 ACUTE SYSTOLIC CONGESTIVE HEART FAILURE (HCC): ICD-10-CM

## 2020-10-20 DIAGNOSIS — R74.01 TRANSAMINITIS: ICD-10-CM

## 2020-10-20 DIAGNOSIS — R79.89 LFT ELEVATION: ICD-10-CM

## 2020-10-20 PROBLEM — R53.83 FATIGUE: Status: ACTIVE | Noted: 2020-10-20

## 2020-10-20 PROBLEM — R11.0 NAUSEA: Status: ACTIVE | Noted: 2020-10-20

## 2020-10-20 PROBLEM — E87.1 HYPONATREMIA: Status: ACTIVE | Noted: 2020-10-20

## 2020-10-20 LAB
ALBUMIN SERPL-MCNC: 3.2 G/DL (ref 3.5–5)
ALBUMIN/GLOB SERPL: 0.9 {RATIO} (ref 1.1–2.2)
ALP SERPL-CCNC: 180 U/L (ref 45–117)
ALT SERPL-CCNC: 3309 U/L (ref 12–78)
ANION GAP SERPL CALC-SCNC: 7 MMOL/L (ref 5–15)
APPEARANCE UR: CLEAR
AST SERPL-CCNC: >2000 U/L (ref 15–37)
BACTERIA URNS QL MICRO: NEGATIVE /HPF
BASOPHILS # BLD: 0.1 K/UL (ref 0–0.1)
BASOPHILS NFR BLD: 1 % (ref 0–1)
BILIRUB SERPL-MCNC: 7.6 MG/DL (ref 0.2–1)
BILIRUB UR QL CFM: POSITIVE
BUN SERPL-MCNC: 9 MG/DL (ref 6–20)
BUN/CREAT SERPL: 10 (ref 12–20)
CALCIUM SERPL-MCNC: 8.7 MG/DL (ref 8.5–10.1)
CHLORIDE SERPL-SCNC: 93 MMOL/L (ref 97–108)
CK SERPL-CCNC: 144 U/L (ref 26–192)
CO2 SERPL-SCNC: 28 MMOL/L (ref 21–32)
COLOR UR: NORMAL
COMMENT, HOLDF: NORMAL
CREAT SERPL-MCNC: 0.87 MG/DL (ref 0.55–1.02)
DIFFERENTIAL METHOD BLD: ABNORMAL
EOSINOPHIL # BLD: 0.1 K/UL (ref 0–0.4)
EOSINOPHIL NFR BLD: 2 % (ref 0–7)
EPITH CASTS URNS QL MICRO: NORMAL /LPF
ERYTHROCYTE [DISTWIDTH] IN BLOOD BY AUTOMATED COUNT: 13.4 % (ref 11.5–14.5)
GLOBULIN SER CALC-MCNC: 3.4 G/DL (ref 2–4)
GLUCOSE SERPL-MCNC: 92 MG/DL (ref 65–100)
GLUCOSE UR STRIP.AUTO-MCNC: NEGATIVE MG/DL
HCT VFR BLD AUTO: 35.7 % (ref 35–47)
HGB BLD-MCNC: 13 G/DL (ref 11.5–16)
HGB UR QL STRIP: NEGATIVE
HYALINE CASTS URNS QL MICRO: NORMAL /LPF (ref 0–5)
IMM GRANULOCYTES # BLD AUTO: 0 K/UL (ref 0–0.04)
IMM GRANULOCYTES NFR BLD AUTO: 0 % (ref 0–0.5)
KETONES UR QL STRIP.AUTO: NEGATIVE MG/DL
LEUKOCYTE ESTERASE UR QL STRIP.AUTO: NEGATIVE
LIPASE SERPL-CCNC: 547 U/L (ref 73–393)
LYMPHOCYTES # BLD: 1.9 K/UL (ref 0.8–3.5)
LYMPHOCYTES NFR BLD: 35 % (ref 12–49)
MCH RBC QN AUTO: 28.2 PG (ref 26–34)
MCHC RBC AUTO-ENTMCNC: 36.4 G/DL (ref 30–36.5)
MCV RBC AUTO: 77.4 FL (ref 80–99)
MONOCYTES # BLD: 1.1 K/UL (ref 0–1)
MONOCYTES NFR BLD: 21 % (ref 5–13)
NEUTS SEG # BLD: 2.2 K/UL (ref 1.8–8)
NEUTS SEG NFR BLD: 41 % (ref 32–75)
NITRITE UR QL STRIP.AUTO: NEGATIVE
NRBC # BLD: 0 K/UL (ref 0–0.01)
NRBC BLD-RTO: 0 PER 100 WBC
PH UR STRIP: 6.5 [PH] (ref 5–8)
PLATELET # BLD AUTO: 206 K/UL (ref 150–400)
PMV BLD AUTO: 9.1 FL (ref 8.9–12.9)
POTASSIUM SERPL-SCNC: 3.8 MMOL/L (ref 3.5–5.1)
PROT SERPL-MCNC: 6.6 G/DL (ref 6.4–8.2)
PROT UR STRIP-MCNC: NEGATIVE MG/DL
RBC # BLD AUTO: 4.61 M/UL (ref 3.8–5.2)
RBC #/AREA URNS HPF: NORMAL /HPF (ref 0–5)
RBC MORPH BLD: ABNORMAL
RBC MORPH BLD: ABNORMAL
SAMPLES BEING HELD,HOLD: NORMAL
SODIUM SERPL-SCNC: 128 MMOL/L (ref 136–145)
SP GR UR REFRACTOMETRY: 1.01 (ref 1–1.03)
UROBILINOGEN UR QL STRIP.AUTO: 1 EU/DL (ref 0.2–1)
WBC # BLD AUTO: 5.4 K/UL (ref 3.6–11)
WBC URNS QL MICRO: NORMAL /HPF (ref 0–4)

## 2020-10-20 PROCEDURE — 99285 EMERGENCY DEPT VISIT HI MDM: CPT

## 2020-10-20 PROCEDURE — 74176 CT ABD & PELVIS W/O CONTRAST: CPT

## 2020-10-20 PROCEDURE — 80053 COMPREHEN METABOLIC PANEL: CPT

## 2020-10-20 PROCEDURE — 86301 IMMUNOASSAY TUMOR CA 19-9: CPT

## 2020-10-20 PROCEDURE — 36415 COLL VENOUS BLD VENIPUNCTURE: CPT

## 2020-10-20 PROCEDURE — 81001 URINALYSIS AUTO W/SCOPE: CPT

## 2020-10-20 PROCEDURE — 80074 ACUTE HEPATITIS PANEL: CPT

## 2020-10-20 PROCEDURE — 82105 ALPHA-FETOPROTEIN SERUM: CPT

## 2020-10-20 PROCEDURE — 82378 CARCINOEMBRYONIC ANTIGEN: CPT

## 2020-10-20 PROCEDURE — 99218 HC RM OBSERVATION: CPT

## 2020-10-20 PROCEDURE — 83690 ASSAY OF LIPASE: CPT

## 2020-10-20 PROCEDURE — 85025 COMPLETE CBC W/AUTO DIFF WBC: CPT

## 2020-10-20 PROCEDURE — 80307 DRUG TEST PRSMV CHEM ANLYZR: CPT

## 2020-10-20 PROCEDURE — 74011250636 HC RX REV CODE- 250/636: Performed by: EMERGENCY MEDICINE

## 2020-10-20 PROCEDURE — 82550 ASSAY OF CK (CPK): CPT

## 2020-10-20 RX ORDER — POLYETHYLENE GLYCOL 3350 17 G/17G
17 POWDER, FOR SOLUTION ORAL DAILY PRN
Status: DISCONTINUED | OUTPATIENT
Start: 2020-10-20 | End: 2020-10-23 | Stop reason: HOSPADM

## 2020-10-20 RX ORDER — FAMOTIDINE 20 MG/1
20 TABLET, FILM COATED ORAL 2 TIMES DAILY
Status: DISCONTINUED | OUTPATIENT
Start: 2020-10-21 | End: 2020-10-23 | Stop reason: HOSPADM

## 2020-10-20 RX ORDER — LOSARTAN POTASSIUM 50 MG/1
100 TABLET ORAL DAILY
Status: DISCONTINUED | OUTPATIENT
Start: 2020-10-21 | End: 2020-10-21

## 2020-10-20 RX ORDER — DEXTROSE, SODIUM CHLORIDE, AND POTASSIUM CHLORIDE 5; .45; .15 G/100ML; G/100ML; G/100ML
75 INJECTION INTRAVENOUS CONTINUOUS
Status: DISCONTINUED | OUTPATIENT
Start: 2020-10-21 | End: 2020-10-23 | Stop reason: HOSPADM

## 2020-10-20 RX ORDER — ACETAMINOPHEN 325 MG/1
650 TABLET ORAL
Status: DISCONTINUED | OUTPATIENT
Start: 2020-10-20 | End: 2020-10-23 | Stop reason: HOSPADM

## 2020-10-20 RX ORDER — ONDANSETRON 2 MG/ML
4 INJECTION INTRAMUSCULAR; INTRAVENOUS
Status: DISCONTINUED | OUTPATIENT
Start: 2020-10-20 | End: 2020-10-23 | Stop reason: HOSPADM

## 2020-10-20 RX ORDER — ACETAMINOPHEN 650 MG/1
650 SUPPOSITORY RECTAL
Status: DISCONTINUED | OUTPATIENT
Start: 2020-10-20 | End: 2020-10-23 | Stop reason: HOSPADM

## 2020-10-20 RX ORDER — ENOXAPARIN SODIUM 100 MG/ML
40 INJECTION SUBCUTANEOUS DAILY
Status: DISCONTINUED | OUTPATIENT
Start: 2020-10-21 | End: 2020-10-23 | Stop reason: HOSPADM

## 2020-10-20 RX ORDER — ONDANSETRON 4 MG/1
4 TABLET, ORALLY DISINTEGRATING ORAL
Status: DISCONTINUED | OUTPATIENT
Start: 2020-10-20 | End: 2020-10-23 | Stop reason: HOSPADM

## 2020-10-20 RX ADMIN — SODIUM CHLORIDE 1000 ML: 900 INJECTION, SOLUTION INTRAVENOUS at 21:16

## 2020-10-21 ENCOUNTER — APPOINTMENT (OUTPATIENT)
Dept: MRI IMAGING | Age: 65
DRG: 442 | End: 2020-10-21
Attending: INTERNAL MEDICINE
Payer: MEDICARE

## 2020-10-21 LAB
ALBUMIN SERPL-MCNC: 3 G/DL (ref 3.5–5)
ALBUMIN/GLOB SERPL: 1 {RATIO} (ref 1.1–2.2)
ALP SERPL-CCNC: 173 U/L (ref 45–117)
ALT SERPL-CCNC: 3045 U/L (ref 12–78)
ANION GAP SERPL CALC-SCNC: 6 MMOL/L (ref 5–15)
AST SERPL-CCNC: >2000 U/L (ref 15–37)
BILIRUB SERPL-MCNC: 7.9 MG/DL (ref 0.2–1)
BUN SERPL-MCNC: 6 MG/DL (ref 6–20)
BUN/CREAT SERPL: 8 (ref 12–20)
CALCIUM SERPL-MCNC: 8.2 MG/DL (ref 8.5–10.1)
CEA SERPL-MCNC: 0.4 NG/ML
CHLORIDE SERPL-SCNC: 104 MMOL/L (ref 97–108)
CO2 SERPL-SCNC: 26 MMOL/L (ref 21–32)
CREAT SERPL-MCNC: 0.76 MG/DL (ref 0.55–1.02)
ERYTHROCYTE [DISTWIDTH] IN BLOOD BY AUTOMATED COUNT: 14.3 % (ref 11.5–14.5)
ETHANOL SERPL-MCNC: <10 MG/DL
GLOBULIN SER CALC-MCNC: 3.1 G/DL (ref 2–4)
GLUCOSE SERPL-MCNC: 84 MG/DL (ref 65–100)
HAV IGM SER QL: NONREACTIVE
HBV CORE IGM SER QL: NONREACTIVE
HBV SURFACE AG SER QL: <0.1 INDEX
HBV SURFACE AG SER QL: NEGATIVE
HCT VFR BLD AUTO: 36.1 % (ref 35–47)
HCV AB SERPL QL IA: NONREACTIVE
HCV COMMENT,HCGAC: NORMAL
HGB BLD-MCNC: 13.4 G/DL (ref 11.5–16)
MAGNESIUM SERPL-MCNC: 2.1 MG/DL (ref 1.6–2.4)
MCH RBC QN AUTO: 28.8 PG (ref 26–34)
MCHC RBC AUTO-ENTMCNC: 37.1 G/DL (ref 30–36.5)
MCV RBC AUTO: 77.6 FL (ref 80–99)
NRBC # BLD: 0 K/UL (ref 0–0.01)
NRBC BLD-RTO: 0 PER 100 WBC
PLATELET # BLD AUTO: 205 K/UL (ref 150–400)
PMV BLD AUTO: 9.4 FL (ref 8.9–12.9)
POTASSIUM SERPL-SCNC: 3.9 MMOL/L (ref 3.5–5.1)
PROT SERPL-MCNC: 6.1 G/DL (ref 6.4–8.2)
RBC # BLD AUTO: 4.65 M/UL (ref 3.8–5.2)
SODIUM SERPL-SCNC: 136 MMOL/L (ref 136–145)
SP1: NORMAL
SP2: NORMAL
SP3: NORMAL
WBC # BLD AUTO: 4.5 K/UL (ref 3.6–11)

## 2020-10-21 PROCEDURE — 2709999900 HC NON-CHARGEABLE SUPPLY

## 2020-10-21 PROCEDURE — 74183 MRI ABD W/O CNTR FLWD CNTR: CPT

## 2020-10-21 PROCEDURE — 80053 COMPREHEN METABOLIC PANEL: CPT

## 2020-10-21 PROCEDURE — 74011250637 HC RX REV CODE- 250/637: Performed by: FAMILY MEDICINE

## 2020-10-21 PROCEDURE — A9585 GADOBUTROL INJECTION: HCPCS | Performed by: RADIOLOGY

## 2020-10-21 PROCEDURE — 74011250636 HC RX REV CODE- 250/636: Performed by: RADIOLOGY

## 2020-10-21 PROCEDURE — 83735 ASSAY OF MAGNESIUM: CPT

## 2020-10-21 PROCEDURE — 65270000029 HC RM PRIVATE

## 2020-10-21 PROCEDURE — 99218 HC RM OBSERVATION: CPT

## 2020-10-21 PROCEDURE — 96372 THER/PROPH/DIAG INJ SC/IM: CPT

## 2020-10-21 PROCEDURE — 74011250636 HC RX REV CODE- 250/636: Performed by: INTERNAL MEDICINE

## 2020-10-21 PROCEDURE — 97161 PT EVAL LOW COMPLEX 20 MIN: CPT

## 2020-10-21 PROCEDURE — 36415 COLL VENOUS BLD VENIPUNCTURE: CPT

## 2020-10-21 PROCEDURE — 85027 COMPLETE CBC AUTOMATED: CPT

## 2020-10-21 RX ORDER — LOSARTAN POTASSIUM 50 MG/1
100 TABLET ORAL DAILY
Status: DISCONTINUED | OUTPATIENT
Start: 2020-10-21 | End: 2020-10-21

## 2020-10-21 RX ORDER — LOSARTAN POTASSIUM 50 MG/1
25 TABLET ORAL
Status: DISCONTINUED | OUTPATIENT
Start: 2020-10-21 | End: 2020-10-23 | Stop reason: HOSPADM

## 2020-10-21 RX ORDER — POLYETHYLENE GLYCOL 400 AND PROPYLENE GLYCOL 4; 3 MG/ML; MG/ML
1 SOLUTION/ DROPS OPHTHALMIC AS NEEDED
COMMUNITY
End: 2021-11-23

## 2020-10-21 RX ORDER — LOSARTAN POTASSIUM 50 MG/1
25 TABLET ORAL DAILY
Status: DISCONTINUED | OUTPATIENT
Start: 2020-10-21 | End: 2020-10-21

## 2020-10-21 RX ORDER — LOSARTAN POTASSIUM 50 MG/1
25 TABLET ORAL
COMMUNITY
End: 2021-11-23

## 2020-10-21 RX ADMIN — LOSARTAN POTASSIUM 25 MG: 50 TABLET, FILM COATED ORAL at 20:56

## 2020-10-21 RX ADMIN — POTASSIUM CHLORIDE, DEXTROSE MONOHYDRATE AND SODIUM CHLORIDE 75 ML/HR: 150; 5; 450 INJECTION, SOLUTION INTRAVENOUS at 00:01

## 2020-10-21 RX ADMIN — GADOBUTROL 7 ML: 604.72 INJECTION INTRAVENOUS at 16:53

## 2020-10-21 RX ADMIN — ENOXAPARIN SODIUM 40 MG: 40 INJECTION SUBCUTANEOUS at 09:05

## 2020-10-21 NOTE — PROGRESS NOTES
Bedside and Verbal shift change report given to   [address] [place] [address] Street, Rn (oncoming nurse) by   Jocelin Steve (offgoing nurse). Report included the following information SBAR, Kardex, Intake/Output, MAR, Accordion, Recent Results and Med Rec Status.

## 2020-10-21 NOTE — H&P
[facility]    Hospitalist Admission Note      NAME:  Rogelio Mayo   :   1955   MRN:  [id number]     PCP:  Brandi Cunha MD     Date/Time of service:  10/20/2020 10:26 PM          Subjective:     CHIEF COMPLAINT: fatigue     HISTORY OF PRESENT ILLNESS:     Ms. Lai Gtz is a 72 y.o.  female who presented to the Emergency Department complaining of fatigue and nausea. Noted for a few days. PCP found abnormal labs. Our ER confirms hyperbilirubinemia and LFTs. We will admit her for observation. Past Medical History:   Diagnosis Date    Bilateral dry eyes     Herpes simplex     Includes herpes labialis    History of mammogram 10/13/2016; 18; 2020    negative; Negative; negative birads 1 dense    Hx of mammogram 14; 10/1/15    normal; negative    Hypertension     Pap smear for cervical cancer screening 9/14/10; 12; 10/1/15; 18    Negative, HPV negative; Negative; Negative, HPV negative; Neg/Neg HPV        Past Surgical History:   Procedure Laterality Date    COLONOSCOPY N/A 2018    COLONOSCOPY performed by Eveline Hatchet, MD at [address] [facility] HX COLONOSCOPY  2006    Wnl    HX GYN  1982    tubal ligation    LAP,TUBAL CAUTERY         Social History     Tobacco Use    Smoking status: Never Smoker    Smokeless tobacco: Never Used   Substance Use Topics    Alcohol use:  Yes     Alcohol/week: 5.0 standard drinks     Types: 5 Glasses of wine per week     Comment: occasional wine        Family History   Problem Relation Age of Onset    Hypertension Mother     Arthritis-osteo Mother     Osteoporosis Mother     Cancer Mother 80        lymphoma    Hypertension Father     Hypertension Sister     Arthritis-osteo Sister     Hypertension Brother     Cancer Maternal Uncle         Allergies   Allergen Reactions    Sulfa (Sulfonamide Antibiotics) Unknown (comments)     Pt denies    Iodine Itching        Prior to Admission medications    Medication Sig Start Date End Date Taking? Authorizing Provider   losartan (COZAAR) 100 mg tablet TAKE 1 TABLET BY MOUTH ONCE DAILY FOR 90 DAYS 12/20/19   Provider, Historical   multivitamin (ONE A DAY) tablet Take 1 Tab by mouth daily. Provider, Historical   b complex vitamins tablet Take 1 Tab by mouth daily. Provider, Historical   omega 3-dha-epa-fish oil (FISH OIL) 100-160-1,000 mg cap Take  by mouth. Provider, Historical   Biotin 2,500 mcg cap Take  by mouth. Provider, Historical   vit C/E/Zn/coppr/lutein/zeaxan (PRESERVISION AREDS-2 PO) Take  by mouth.     Provider, Historical   triamterene-hydroCHLOROthiazide (MAXZIDE) 37.5-25 mg per tablet  11/5/18   Provider, Historical       Review of Systems:  (bold if positive, if negative)    Gen:  fatigueEyes:  ENT:  CVS:  Pulm:  GI:  nausea,:  MS:  Skin:  Psych:  Endo:  Hem:  Renal:  Neuro:        Objective:      VITALS:    Vital signs reviewed; most recent are:    Visit Vitals  /73   Pulse 80   Temp 97.1 °F (36.2 °C)   Resp 18   Ht 5' 5\" (1.651 m)   Wt 74.8 kg (164 lb 14.5 oz)   SpO2 99%   BMI 27.44 kg/m²     SpO2 Readings from Last 6 Encounters:   10/20/20 99%   12/05/18 100%   06/02/15 97%   07/06/14 100%   12/04/13 99%   05/23/13 100%        No intake or output data in the 24 hours ending 10/20/20 9999     Exam:     Physical Exam:    Gen:  Well-developed, well-nourished, in no acute distress  HEENT:  Pink conjunctivae, PERRL, hearing intact to voice, moist mucous membranes  Neck:  Supple, without masses, thyroid non-tender  Resp:  No accessory muscle use, clear breath sounds without wheezes rales or rhonchi  Card:  No murmurs, normal S1, S2 without thrills, bruits or peripheral edema  Abd:  Soft, non-tender, non-distended, normoactive bowel sounds are present, no mass  Lymph:  No cervical or inguinal adenopathy  Musc:  No cyanosis or clubbing  Skin:  Jaundice, skin turgor is good  Neuro:  Cranial nerves are grossly intact, no focal motor weakness, follows commands appropriately  Psych:  Good insight, oriented to person, place and time, alert     Labs:    Recent Labs     10/20/20  2118   WBC 5.4   HGB 13.0   HCT 35.7        Recent Labs     10/20/20  2118   *   K 3.8   CL 93*   CO2 28   GLU 92   BUN 9   CREA 0.87   CA 8.7   ALB 3.2*   TBILI 7.6*   ALT 3,309*     No results found for: GLUCPOC  No results for input(s): PH, PCO2, PO2, HCO3, FIO2 in the last 72 hours. No results for input(s): INR, INREXT in the last 72 hours. All Micro Results     None          I have reviewed previous records       Assessment and Plan:      Hyperbilirubinemia / LFT elevation - POA, unclear etiology. Viral hepatitis vs cancer vs other. MRCP. CT abnormal. GI consult. Serologies. Monitor. Clear diet anticipating EGD/ERCP tomorrow. Fatigue / Nausea - POA, presumed due to liver issues. Monitor. PT eval.  Prn zofran    Hyponatremia - POA, likely due to dehydration. Hydrate. Hypertension - For now hold maxzide but continue lasartan    Telemetry reviewed:   normal sinus rhythm    Risk of deterioration: high      Total time spent with patient: 79 Minutes I personally reviewed chart, notes, data and current medications in the medical record. I have personally examined and treated the patient at bedside during this period.                  Care Plan discussed with: Patient, Nursing Staff and >50% of time spent in counseling and coordination of care    Discussed:  Care Plan       ___________________________________________________    Attending Physician: Kaley Tse MD

## 2020-10-21 NOTE — ED TRIAGE NOTES
Patient arrives with complaint of nausea, decreased appetite, and dark colored urine. Reports acid reflux for past 10 days. Patient had rapid covid-19 test at PCP this morning with negative result. Patient had labs drawn and urine tested. Patient was contacted by PCP this evening, told that her blood levels were high, stating concern of patient's liver enzymes.

## 2020-10-21 NOTE — PROGRESS NOTES
PHYSICAL THERAPY EVALUATION/DISCHARGE  Patient: Reva Garduno [de-identified]72 y.o. female)  Date: 10/21/2020  Primary Diagnosis: Hyperbilirubinemia [E80.6]       Precautions:   Fall      ASSESSMENT  Based on the objective data described below, the patient presents with hyperbilirubinemia. She is up ad cris in her room. She was able to ambulate in hallway without loss of balance or instability. She is at her baseline for mobility. Functional Outcome Measure: The patient scored 28/28 on the tinetti outcome measure which is indicative of low fall risk. Other factors to consider for discharge:      Further skilled acute physical therapy is not indicated at this time. PLAN :  Recommendation for discharge: (in order for the patient to meet his/her long term goals)  No skilled physical therapy/ follow up rehabilitation needs identified at this time. This discharge recommendation:  A follow-up discussion with the attending provider and/or case management is planned    IF patient discharges home will need the following DME: none       SUBJECTIVE:   Patient stated my son's are PT's.     OBJECTIVE DATA SUMMARY:   HISTORY:    Past Medical History:   Diagnosis Date    Bilateral dry eyes     Herpes simplex     Includes herpes labialis    History of mammogram 10/13/2016; 11/13/18; 01/03/2020    negative; Negative; negative birads 1 dense    Hx of mammogram 09/30/14; 10/1/15    normal; negative    Hypertension     Pap smear for cervical cancer screening 9/14/10; 9/18/12; 10/1/15; 11/13/18    Negative, HPV negative; Negative; Negative, HPV negative; Neg/Neg HPV     Past Surgical History:   Procedure Laterality Date    COLONOSCOPY N/A 12/5/2018    COLONOSCOPY performed by Jonathan Dunn MD at [facility] HX COLONOSCOPY  2006    Wnl    HX GYN  1982    tubal ligation    LAP,TUBAL CAUTERY         Prior level of function: see above  Personal factors and/or comorbidities impacting plan of care: see below    Home Situation  Home Environment: Private residence  One/Two Story Residence: One story  Living Alone: No  Support Systems: Spouse/Significant Other/Partner  Patient Expects to be Discharged to[de-identified] Private residence  Current DME Used/Available at Home: None    EXAMINATION/PRESENTATION/DECISION MAKING:   Critical Behavior:              Hearing: Auditory  Auditory Impairment: None    Range Of Motion:  AROM: Within functional limits           PROM: Within functional limits           Strength:    Strength: Within functional limits                    Tone & Sensation:   Tone: Normal              Sensation: Intact               Coordination:  Coordination: Within functional limits  Vision:      Functional Mobility:  Bed Mobility:  Rolling: Independent  Supine to Sit: Independent  Sit to Supine: Independent     Transfers:  Sit to Stand: Independent  Stand to Sit: Independent        Bed to Chair: Independent              Balance:      Ambulation/Gait Training:  Distance (ft): 200 Feet (ft)  Assistive Device: Gait belt  Ambulation - Level of Assistance: Independent     Gait Description (WDL): Exceptions to WDL                                          Stairs: Therapeutic Exercises:       Functional Measure:  Tinetti test:    Sitting Balance: 1  Arises: 2  Attempts to Rise: 2  Immediate Standing Balance: 2  Standing Balance: 2  Nudged: 2  Eyes Closed: 1  Turn 360 Degrees - Continuous/Discontinuous: 1  Turn 360 Degrees - Steady/Unsteady: 1  Sitting Down: 2  Balance Score: 16 Balance total score  Indication of Gait: 1  R Step Length/Height: 1  L Step Length/Height: 1  R Foot Clearance: 1  L Foot Clearance: 1  Step Symmetry: 1  Step Continuity: 1  Path: 2  Trunk: 2  Walking Time: 1  Gait Score: 12 Gait total score  Total Score: 28/28 Overall total score         Tinetti Tool Score Risk of Falls  <19 = High Fall Risk  19-24 = Moderate Fall Risk  25-28 = Low Fall Risk  Rupertoetti ME.  Performance-Oriented Assessment of Mobility Problems in Elderly Patients. [facility] 66; [id number]. (Scoring Description: PT Bulletin Feb. 10, 1993)    Older adults: Neisha Shrsetha et al, 2009; n = 1000 [facility] elderly evaluated with ABC, GREGG, ADL, and IADL)  · Mean GREGG score for males aged 69-68 years = 26.21(3.40)  · Mean GREGG score for females age 69-68 years = 25.16(4.30)  · Mean GREGG score for males over 80 years = 23.29(6.02)  · Mean GREGG score for females over 80 years = 17.20(8.32)          Physical Therapy Evaluation Charge Determination   History Examination Presentation Decision-Making   MEDIUM  Complexity : 1-2 comorbidities / personal factors will impact the outcome/ POC  LOW Complexity : 1-2 Standardized tests and measures addressing body structure, function, activity limitation and / or participation in recreation  LOW Complexity : Stable, uncomplicated  Other outcome measures tinetti  LOW       Based on the above components, the patient evaluation is determined to be of the following complexity level: LOW     Pain Rating:  None reported    Activity Tolerance:   Good  Please refer to the flowsheet for vital signs taken during this treatment. After treatment patient left in no apparent distress:   Sitting in chair and Call bell within reach    COMMUNICATION/EDUCATION:   The patients plan of care was discussed with: Registered nurse. Fall prevention education was provided and the patient/caregiver indicated understanding., Patient/family have participated as able in goal setting and plan of care. and Patient/family agree to work toward stated goals and plan of care.     Thank you for this referral.  Jaynee Gosselin, PT, DPT   Time Calculation: 9 mins

## 2020-10-21 NOTE — PROGRESS NOTES
GI    Addendum    She wishes formal surgical advice prior to decision. Will place consult.   Please call again if she wishes endoscopic intervention

## 2020-10-21 NOTE — PROGRESS NOTES
[facility]: MED RECONCILIATION    Medications added:     Turmeric once daily - has not taken in about a week  Systane PRN for dry eyes    Medications removed:    Maxzide - patient does not take this    Medications adjusted:    Losartan 25 mg (1/2 of a 50 mg tablet) once daily (adjusted from 100 mg daily)    Information obtained from: patient, Rx query    Significant PMH/Disease States:   Past Medical History:   Diagnosis Date    Bilateral dry eyes     Herpes simplex     Includes herpes labialis    History of mammogram 10/13/2016; 11/13/18; 01/03/2020    negative; Negative; negative birads 1 dense    Hx of mammogram 09/30/14; 10/1/15    normal; negative    Hypertension     Pap smear for cervical cancer screening 9/14/10; 9/18/12; 10/1/15; 11/13/18    Negative, HPV negative; Negative; Negative, HPV negative; Neg/Neg HPV     Chief Complaint for this Admission:   Chief Complaint   Patient presents with    Abnormal Lab Results    Nausea     Allergies: Sulfa (sulfonamide antibiotics) and Iodine    Prior to Admission Medications:     Medication Documentation Review Audit       Reviewed by Chris Berrios PHARMD (Pharmacist) on 10/21/20 at 1466      Medication Sig Documenting Provider Last Dose Status Taking? Biotin 2,500 mcg cap Take 1 Cap by mouth daily. Provider, Historical 10/20/2020 am Active Yes   losartan (COZAAR) 50 mg tablet Take 25 mg by mouth daily. 1/2 tab = 25 mg Provider, Historical 10/20/2020 am Active Yes   multivitamin (ONE A DAY) tablet Take 1 Tab by mouth daily. Provider, Historical 10/20/2020 am Active Yes   omega 3-dha-epa-fish oil (FISH OIL) 100-160-1,000 mg cap Take 1 Cap by mouth daily. Provider, Historical 10/20/2020 am Active Yes   peg 400-propylene glycol (Systane, propylene glycol,) 0.4-0.3 % drop Administer 1 Drop to both eyes as needed. Provider, Historical  Active Yes   TURMERIC PO Take 1 Cap by mouth daily.  Provider, Historical 10/14/2020 Active Yes   vit C/E/Zn/coppr/lutein/zeaxan (PRESERVISION AREDS-2 PO) Take 2 Tabs by mouth daily. Provider, Historical 10/14/2020 Active Yes                Thank you for the consult,  Woodrow Boyd

## 2020-10-21 NOTE — PROGRESS NOTES
Bedside shift change report given to MONTRELL Sellers (oncoming nurse) by Amadou Ledesma RN (offgoing nurse). Report included the following information SBAR, Kardex, Procedure Summary, Intake/Output, Recent Results and Med Rec Status.

## 2020-10-21 NOTE — PROGRESS NOTES
Reason for Admission:   Hyperbilirubinemia                   RUR Score:      N/A               Plan for utilizing home health:      No prior home health history    PCP: First and Last name:  Dr. Fredericka Lesch   Name of Practice:    Are you a current patient: Yes/No: yes   Approximate date of last visit: 10/20/20   Can you participate in a virtual visit with your PCP: yes                    Current Advanced Directive/Advance Care Plan: Patient has ACP at home; , Manuel Oliveira, is surrogate healthcare decision maker. Transition of Care Plan:                    CM met with the patient to begin discharge planning. She lives with her  in a two story house; master bedroom is on the first floor. Patient does not have any DME at home. Her preferred pharmacy is [facility] on [address]. Observation notice was provided in writing to the patient. Patient was also provided information on [facility]. No discharge needs anticipated. 1.  CM following  2. PT/OT to evaluate for any needs  3. Patient to discharge home when stable with PCP follow-up on Tuesday, 10/27  4.  to transport at discharge. Care Management Interventions  PCP Verified by CM:  Yes  Transition of Care Consult (CM Consult): Discharge Planning  Discharge Durable Medical Equipment: No  Physical Therapy Consult: Yes  Occupational Therapy Consult: Yes  Speech Therapy Consult: No  Current Support Network: Lives with Spouse  Confirm Follow Up Transport: Family  The Plan for Transition of Care is Related to the Following Treatment Goals : Hyperbilirubinemia  Discharge Location  Discharge Placement: Home     Kyra Urbina LCSW

## 2020-10-21 NOTE — PROGRESS NOTES
[address] [facility]  [facility]ospitalist [facility]                                                                                          Hospitalist Progress Note  Kevin Gil MD        Date of Service:  10/21/2020  NAME:  Mary Dietz  :  1955  MRN:  [id number]      Admission Summary:   71-year-old female presented to the emergency department with fatigue and nausea. She was found to have hyperbilirubinemia and elevated LFTs    Interval history / Subjective:   Reports feeling okay, denies any abdominal pain or discomfort     Assessment & Plan:     Hyperbilirubinemia / LFT elevation - POA, unclear etiology. cancer vs other. Hepatitis panel unremarkable MRCP pending. CT abnormal. GI consult. Monitor serologies. Clear diet anticipating EGD/ERCP tomorrow.     Fatigue / Nausea - POA, presumed due to liver issues. Continue to monitor. PT eval.  Prn zofran     Hyponatremia - POA, likely due to dehydration. Continue to hydrate.      Hypertension - For now hold maxzide but continue lasartan    Code status: Full  DVT prophylaxis: [facility] Problems  Date Reviewed: 10/20/2020          Codes Class Noted POA    Hypertension ICD-10-CM: I10  ICD-9-CM: 401.9  Unknown Yes        Hyperbilirubinemia ICD-10-CM: E80.6  ICD-9-CM: 782.4  10/20/2020 Yes        LFT elevation ICD-10-CM: R79.89  ICD-9-CM: 790.6  10/20/2020 Yes        Fatigue ICD-10-CM: R53.83  ICD-9-CM: 780.79  10/20/2020 Yes        Nausea ICD-10-CM: R11.0  ICD-9-CM: 787.02  10/20/2020 Yes        Hyponatremia ICD-10-CM: E87.1  ICD-9-CM: 276.1  10/20/2020 Yes                Review of Systems:   A comprehensive review of systems was negative except for that written in the HPI. Vital Signs:    Last 24hrs VS reviewed since prior progress note.  Most recent are:  Visit Vitals  /78 (BP 1 Location: Right arm, BP Patient Position: Sitting)   Pulse 64   Temp 97.8 °F (36.6 °C)   Resp 16   Ht 5' 5\" (1.651 m)   Wt 74.8 kg (164 lb 14.5 oz) SpO2 98%   BMI 27.44 kg/m²         Intake/Output Summary (Last 24 hours) at 10/21/2020 1543  Last data filed at 10/21/2020 0451  Gross per 24 hour   Intake 300 ml   Output    Net 300 ml        Physical Examination:             Constitutional:  No acute distress, cooperative, pleasant    ENT:  Oral mucosa moist, oropharynx benign. Resp:  CTA bilaterally. No wheezing/rhonchi/rales. No accessory muscle use   CV:  Regular rhythm, normal rate, no murmurs, gallops, rubs    GI:  Soft, non distended, non tender. normoactive bowel sounds, no hepatosplenomegaly     Musculoskeletal:  No edema, warm, 2+ pulses throughout    Neurologic:  Moves all extremities. AAOx3, CN II-XII reviewed     Psych:  Good insight, Not anxious nor agitated. Data Review:    Review and/or order of clinical lab test      Labs:     Recent Labs     10/21/20  0704 10/20/20  2118   WBC 4.5 5.4   HGB 13.4 13.0   HCT 36.1 35.7    206     Recent Labs     10/21/20  0704 10/20/20  2118    128*   K 3.9 3.8    93*   CO2 26 28   BUN 6 9   CREA 0.76 0.87   GLU 84 92   CA 8.2* 8.7   MG 2.1  --      Recent Labs     10/21/20  0704 10/20/20  2118   ALT 3,045* 3,309*   * 180*   TBILI 7.9* 7.6*   TP 6.1* 6.6   ALB 3.0* 3.2*   GLOB 3.1 3.4   LPSE  --  547*     No results for input(s): INR, PTP, APTT, INREXT in the last 72 hours. No results for input(s): FE, TIBC, PSAT, FERR in the last 72 hours. No results found for: FOL, RBCF   No results for input(s): PH, PCO2, PO2 in the last 72 hours.   Recent Labs     10/20/20  2118        No results found for: CHOL, CHOLX, CHLST, CHOLV, HDL, HDLP, LDL, LDLC, DLDLP, TGLX, TRIGL, TRIGP, CHHD, CHHDX  No results found for: [facility]  Lab Results   Component Value Date/Time    Color YELLOW/STRAW 10/20/2020 09:18 PM    Appearance CLEAR 10/20/2020 09:18 PM    Specific gravity 1.006 10/20/2020 09:18 PM    pH (UA) 6.5 10/20/2020 09:18 PM    Protein Negative 10/20/2020 09:18 PM    Glucose Negative 10/20/2020 09:18 PM    Ketone Negative 10/20/2020 09:18 PM    Urobilinogen 1.0 10/20/2020 09:18 PM    Nitrites Negative 10/20/2020 09:18 PM    Leukocyte Esterase Negative 10/20/2020 09:18 PM    Epithelial cells FEW 10/20/2020 09:18 PM    Bacteria Negative 10/20/2020 09:18 PM    WBC 0-4 10/20/2020 09:18 PM    RBC 0-5 10/20/2020 09:18 PM         Medications Reviewed:     Current Facility-Administered Medications   Medication Dose Route Frequency    losartan (COZAAR) tablet 25 mg  25 mg Oral QHS    acetaminophen (TYLENOL) tablet 650 mg  650 mg Oral Q6H PRN    Or    acetaminophen (TYLENOL) suppository 650 mg  650 mg Rectal Q6H PRN    polyethylene glycol (MIRALAX) packet 17 g  17 g Oral DAILY PRN    ondansetron (ZOFRAN ODT) tablet 4 mg  4 mg Oral Q8H PRN    Or    ondansetron (ZOFRAN) injection 4 mg  4 mg IntraVENous Q6H PRN    famotidine (PEPCID) tablet 20 mg  20 mg Oral BID    enoxaparin (LOVENOX) injection 40 mg  40 mg SubCUTAneous DAILY    dextrose 5% - 0.45% NaCl with KCl 20 mEq/L infusion  75 mL/hr IntraVENous CONTINUOUS     ______________________________________________________________________  EXPECTED LENGTH OF STAY: - - -  ACTUAL LENGTH OF STAY:          Oksana Ha MD

## 2020-10-21 NOTE — ED PROVIDER NOTES
Ms. Debra Meek is a 24-year-old female who presents to the ER with complaints of fatigue and nausea. He said that she has been feeling bad for the last few days. She went to go to doctor's office to be tested for Covid. That was negative. However, they found that her liver enzymes are elevated. She has noticed that her urine has been an orange color. She has not noticed any skin changes. She is not having any abdominal pain. She denies any chest pain or trouble breathing. No fevers or chills. No runny nose, sore throat, cough. She denies any other complaints. She said that her whole body is missing a little bit though.            Past Medical History:   Diagnosis Date    Bilateral dry eyes     Herpes simplex     Includes herpes labialis    History of mammogram 10/13/2016; 11/13/18; 01/03/2020    negative; Negative; negative birads 1 dense    Hx of mammogram 09/30/14; 10/1/15    normal; negative    Hypertension     Hypertension     Pap smear for cervical cancer screening 9/14/10; 9/18/12; 10/1/15; 11/13/18    Negative, HPV negative; Negative; Negative, HPV negative; Neg/Neg HPV       Past Surgical History:   Procedure Laterality Date    COLONOSCOPY N/A 12/5/2018    COLONOSCOPY performed by Stef Thomas MD at [facility] 58 HX COLONOSCOPY  2006    Wnl    HX GYN  1982    tubal ligation    LAP,TUBAL CAUTERY           Family History:   Problem Relation Age of Onset    Hypertension Mother     Arthritis-osteo Mother     Osteoporosis Mother     Cancer Mother 80        lymphoma    Hypertension Father     Hypertension Sister     Arthritis-osteo Sister     Hypertension Brother     Cancer Maternal Uncle        Social History     Socioeconomic History    Marital status:      Spouse name: Not on file    Number of children: Not on file    Years of education: Not on file    Highest education level: Not on file   Occupational History    Not on file   Social Needs    Financial resource strain: Not on file    Food insecurity     Worry: Not on file     Inability: Not on file    Transportation needs     Medical: Not on file     Non-medical: Not on file   Tobacco Use    Smoking status: Never Smoker    Smokeless tobacco: Never Used   Substance and Sexual Activity    Alcohol use: Yes     Alcohol/week: 5.0 standard drinks     Types: 5 Glasses of wine per week     Comment: occasional wine    Drug use: No    Sexual activity: Yes     Partners: Male     Birth control/protection: Surgical   Lifestyle    Physical activity     Days per week: Not on file     Minutes per session: Not on file    Stress: Not on file   Relationships    Social connections     Talks on phone: Not on file     Gets together: Not on file     Attends Mosque service: Not on file     Active member of club or organization: Not on file     Attends meetings of clubs or organizations: Not on file     Relationship status: Not on file    Intimate partner violence     Fear of current or ex partner: Not on file     Emotionally abused: Not on file     Physically abused: Not on file     Forced sexual activity: Not on file   Other Topics Concern    Not on file   Social History Narrative    Not on file         ALLERGIES: Sulfa (sulfonamide antibiotics) and Iodine    Review of Systems   Constitutional: Positive for fatigue. Negative for chills and fever. HENT: Negative for rhinorrhea and sore throat. Respiratory: Negative for cough and shortness of breath. Cardiovascular: Negative for chest pain. Gastrointestinal: Positive for nausea. Negative for abdominal pain, diarrhea and vomiting. Genitourinary: Negative for dysuria and urgency. Musculoskeletal: Negative for arthralgias and back pain. Skin: Negative for rash. Neurological: Negative for dizziness, weakness and light-headedness.        Vitals:    10/20/20 2005   BP: (!) 162/88   Pulse: 80   Resp: 18   Temp: 97.1 °F (36.2 °C)   SpO2: 99%   Weight: 74.8 kg (164 lb 14.5 oz)   Height: 5' 5\" (1.651 m)            Physical Exam     Vital signs reviewed. Nursing notes reviewed. Const:  No acute distress, well developed, well nourished  Head:  Atraumatic, normocephalic  Eyes:  PERRL, conjunctiva normal, +scleral icterus  HEENT: Jaundiced under the tongue  Neck:  Supple, trachea midline  Cardiovascular: Regular rate  Resp:  No resp distress, no increased work of breathing  Abd:  Soft, non-tender, non-distended, no rebound, no guarding, no CVA tenderness  MSK:  No pedal edema, normal ROM  Neuro:  Alert and oriented x3, no cranial nerve defect  Skin:  Warm, dry, intact  Psych: normal mood and affect, behavior is normal, judgement and thought content is normal          MDM  Number of Diagnoses or Management Options     Amount and/or Complexity of Data Reviewed  Clinical lab tests: ordered and reviewed  Tests in the radiology section of CPT®: ordered and reviewed  Review and summarize past medical records: yes    Patient Progress  Patient progress: stable         Procedures          Perfect Serve Consult for Admission  10:17 PM    ED Room Number: ER06/06  Patient Name and age:  Javon Cardozo 72 y.o.  female  Working Diagnosis:   1. Painless jaundice    2. Transaminitis        COVID-19 Suspicion:  no  Sepsis present:  no  Reassessment needed: no  Readmission: no  Isolation Requirements:  no  Recommended Level of Care:  med/surg  Department:[facility] - [phone]       Ms. Ramon Hong is a 70yo female who presents to the ER with complaints of painless jaundice. Pt. Found to have elevated LFTs. No obvious cause on CT. CT done without contrast due to allergy. Pt.  To be evaluated for admission by the hospitalist.

## 2020-10-21 NOTE — CONSULTS
Gastroenterology Consult     Referring Physician: Khloe Villagran    Consult Date: 10/21/2020     Fatigue     Chief Complaint: fatigue    History of Present Illness: Mary Dietz is a 72 y.o. female who is seen in consultation for nausea, fatigue, abnormal CT. Admit for these symptoms, plus jaundice, abn CT; has also noted dark urine, acholic stool. No fever, vomiting; currently no pain    Past Medical History:   Diagnosis Date    Bilateral dry eyes     Herpes simplex     Includes herpes labialis    History of mammogram 10/13/2016; 11/13/18; 01/03/2020    negative; Negative; negative birads 1 dense    Hx of mammogram 09/30/14; 10/1/15    normal; negative    Hypertension     Pap smear for cervical cancer screening 9/14/10; 9/18/12; 10/1/15; 11/13/18    Negative, HPV negative; Negative; Negative, HPV negative; Neg/Neg HPV     Past Surgical History:   Procedure Laterality Date    COLONOSCOPY N/A 12/5/2018    COL[facility]OSCOPY performed by Micah [facility]lap [facility] at [facility] 58 HX COLONOSCOPY  2006    Wnl    HX GYN  1982    tubal ligation    LAP,TUBAL CAUTERY        Family History   Problem Relation Age of Onset    Hypertension Mother     Arthritis-osteo Mother     Osteoporosis Mother     Cancer Mother 80        lymphoma    Hypertension Father     Hypertension Sister     Arthritis-osteo Sister     Hypertension Brother     Cancer Maternal Uncle      Social History     Tobacco Use    Smoking status: Never Smoker    Smokeless tobacco: Never Used   Substance Use Topics    Alcohol use:  Yes     Alcohol/week: 5.0 standard drinks     Types: 5 Glasses of wine per week     Comment: occasional wine      Allergies   Allergen Reactions    Sulfa (Sulfonamide Antibiotics) Unknown (comments)     Pt denies    Iodine Itching     Current Facility-Administered Medications   Medication Dose Route Frequency    losartan (COZAAR) tablet 25 mg  25 mg Oral QHS    acetaminophen (TYLENOL) tablet 650 mg  650 mg Oral Q6H PRN Or    acetaminophen (TYLENOL) suppository 650 mg  650 mg Rectal Q6H PRN    polyethylene glycol (MIRALAX) packet 17 g  17 g Oral DAILY PRN    ondansetron (ZOFRAN ODT) tablet 4 mg  4 mg Oral Q8H PRN    Or    ondansetron (ZOFRAN) injection 4 mg  4 mg IntraVENous Q6H PRN    famotidine (PEPCID) tablet 20 mg  20 mg Oral BID    enoxaparin (LOVENOX) injection 40 mg  40 mg SubCUTAneous DAILY    dextrose 5% - 0.45% NaCl with KCl 20 mEq/L infusion  75 mL/hr IntraVENous CONTINUOUS        Review of Systems:  A detailed 10 organ review of systems is obtained with pertinent positives as listed in the History of Present Illness and Past Medical History. All others are negative. Objective:     Physical Exam:  Visit Vitals  /75 (BP 1 Location: Right arm, BP Patient Position: Sitting)   Pulse 71   Temp 98 °F (36.7 °C)   Resp 16   Ht 5' 5\" (1.651 m)   Wt 74.8 kg (164 lb 14.5 oz)   SpO2 97%   BMI 27.44 kg/m²        Skin:  Extremities and face reveal no rashes. No galo erythema. No telangiectasias on the chest wall. HEENT: Sclerae icteric. Extra-occular muscles are intact. No oral ulcers. No abnormal pigmentation of the lips. The neck is supple. Cardiovascular: Regular rate and rhythm. No murmurs, gallops, or rubs. PMI nondisplaced. Carotids without bruits. Respiratory:  Comfortable breathing with no accessory muscle use. Clear breath sounds with no wheezes, rales, or rhonchi. GI:  Abdomen nondistended, soft, and nontender. Normal active bowel sounds. No enlargement of the liver or spleen. No masses palpable. Musculoskeletal:  No pitting edema of the lower legs. Extremities have good range of motion. No costovertebral tenderness. Neurological:  Gross memory appears intact. Patient is alert and oriented. Psychiatric:  Mood appears appropriate with judgement intact. Lymphatic:  No cervical or supraclavicular adenopathy.     Lab/Data Review:  CMP:   Lab Results   Component Value Date/Time     10/21/2020 07:04 AM    K 3.9 10/21/2020 07:04 AM     10/21/2020 07:04 AM    CO2 26 10/21/2020 07:04 AM    AGAP 6 10/21/2020 07:04 AM    GLU 84 10/21/2020 07:04 AM    BUN 6 10/21/2020 07:04 AM    CREA 0.76 10/21/2020 07:04 AM    GFRAA >60 10/21/2020 07:04 AM    GFRNA >60 10/21/2020 07:04 AM    CA 8.2 (L) 10/21/2020 07:04 AM    MG 2.1 10/21/2020 07:04 AM    ALB 3.0 (L) 10/21/2020 07:04 AM    TP 6.1 (L) 10/21/2020 07:04 AM    GLOB 3.1 10/21/2020 07:04 AM    AGRAT 1.0 (L) 10/21/2020 07:04 AM    ALT 3,045 (H) 10/21/2020 07:04 AM     CBC:   Lab Results   Component Value Date/Time    WBC 4.5 10/21/2020 07:04 AM    HGB 13.4 10/21/2020 07:04 AM    HCT 36.1 10/21/2020 07:04 AM     10/21/2020 07:04 AM     T Bili 7.6    CT: Abdomen/Pelvis:  Evaluation of the solid organs is markedly limited without the use of IV  contrast.     Liver:  No focal liver lesions. There is periportal edema versus intrahepatic  biliary ductal dilation noted.     Biliary system: Gallbladder is contracted.     Spleen: Normal.     Pancreas: Normal.     Kidneys/Ureters/Bladder: No renal masses. No renal or ureteral calculi. No  hydronephrosis or hydroureter. The bladder is normal.     Adrenals: Normal.     Stomach/bowel: No dilation or abnormal wall thickening is present. No free  intraperitoneal air noted. Normal appendix. Assessment/Plan:     Active Problems:    Hypertension ()      Hyperbilirubinemia (10/20/2020)      LFT elevation (10/20/2020)      Fatigue (10/20/2020)      Nausea (10/20/2020)      Hyponatremia (10/20/2020)         Discussed differential diagnosis current findings. For MRCP  I have discussed possible ERCP, sphincterotomy, stone extraction, stent placement biopsy, surgical alternatives, potential complications including but not limited to pancreatitis, bleeding, perforation requiring operative repair and/or blood transfusions. All questions answered.   She and  are considering their options

## 2020-10-21 NOTE — ED NOTES
TRANSFER - OUT REPORT:    Verbal report given to MONTRELL Coyne(name) on Jairon I Dolan  being transferred to 5th Floor(unit) for routine progression of care       Report consisted of patients Situation, Background, Assessment and   Recommendations(SBAR). Information from the following report(s) SBAR, Kardex, ED Summary, MAR, Accordion and Recent Results was reviewed with the receiving nurse. Lines:   Peripheral IV 10/20/20 Left Antecubital (Active)        Opportunity for questions and clarification was provided.       Patient transported with:   Registered Nurse

## 2020-10-21 NOTE — PROGRESS NOTES
Occupational Therapy Note:  Orders received and appreciated. Chart reviewed. Educated pt on role of OT. Pt stated she is up ad cris in room with good safety awareness, no LOB and performing all ADLs without assist.  PT evaluated and discharged pt as she was independent. Pt states she does not want or need OT services at this time. Will complete OT order. Thank you for the consult.   Daiana Frances, OTR/L, CBIS

## 2020-10-22 ENCOUNTER — APPOINTMENT (OUTPATIENT)
Dept: NON INVASIVE DIAGNOSTICS | Age: 65
DRG: 442 | End: 2020-10-22
Attending: INTERNAL MEDICINE
Payer: MEDICARE

## 2020-10-22 ENCOUNTER — APPOINTMENT (OUTPATIENT)
Dept: ULTRASOUND IMAGING | Age: 65
DRG: 442 | End: 2020-10-22
Attending: INTERNAL MEDICINE
Payer: MEDICARE

## 2020-10-22 LAB
AFP-TM SERPL-MCNC: 7.5 NG/ML (ref 0–8.3)
ALBUMIN SERPL-MCNC: 3 G/DL (ref 3.5–5)
ALBUMIN/GLOB SERPL: 0.9 {RATIO} (ref 1.1–2.2)
ALP SERPL-CCNC: 162 U/L (ref 45–117)
ALT SERPL-CCNC: 2863 U/L (ref 12–78)
ANION GAP SERPL CALC-SCNC: 5 MMOL/L (ref 5–15)
AST SERPL-CCNC: 1955 U/L (ref 15–37)
BASOPHILS # BLD: 0.1 K/UL (ref 0–0.1)
BASOPHILS NFR BLD: 1 % (ref 0–1)
BILIRUB SERPL-MCNC: 9.5 MG/DL (ref 0.2–1)
BUN SERPL-MCNC: 5 MG/DL (ref 6–20)
BUN/CREAT SERPL: 8 (ref 12–20)
CALCIUM SERPL-MCNC: 8 MG/DL (ref 8.5–10.1)
CANCER AG19-9 SERPL-ACNC: 91 U/ML (ref 0–35)
CHLORIDE SERPL-SCNC: 101 MMOL/L (ref 97–108)
CO2 SERPL-SCNC: 27 MMOL/L (ref 21–32)
CREAT SERPL-MCNC: 0.65 MG/DL (ref 0.55–1.02)
DIFFERENTIAL METHOD BLD: ABNORMAL
ECHO AO ROOT DIAM: 3.28 CM
ECHO AR MAX VEL PISA: 461.64 CENTIMETER/SECOND
ECHO AV AREA PEAK VELOCITY: 1.87 CM2
ECHO AV AREA/BSA PEAK VELOCITY: 1 CM2/M2
ECHO AV PEAK GRADIENT: 14.09 MMHG
ECHO AV PEAK VELOCITY: 187.67 CM/S
ECHO AV REGURGITANT PHT: 600.25 MS
ECHO EST RA PRESSURE: 8 MMHG
ECHO LA AREA 4C: 18.27 CM2
ECHO LA MAJOR AXIS: 3.06 CM
ECHO LA MINOR AXIS: 1.68 CM
ECHO LA VOL 2C: 44.96 ML (ref 22–52)
ECHO LA VOL 4C: 48.03 ML (ref 22–52)
ECHO LA VOL BP: 53.24 ML (ref 22–52)
ECHO LA VOL/BSA BIPLANE: 29.28 ML/M2 (ref 16–28)
ECHO LA VOLUME INDEX A2C: 24.73 ML/M2 (ref 16–28)
ECHO LA VOLUME INDEX A4C: 26.42 ML/M2 (ref 16–28)
ECHO LV E' LATERAL VELOCITY: 8.82 CENTIMETER/SECOND
ECHO LV E' SEPTAL VELOCITY: 13.73 CENTIMETER/SECOND
ECHO LV INTERNAL DIMENSION DIASTOLIC: 4.87 CM (ref 3.9–5.3)
ECHO LV INTERNAL DIMENSION SYSTOLIC: 3.76 CM
ECHO LV IVSD: 0.85 CM (ref 0.6–0.9)
ECHO LV MASS 2D: 135.1 G (ref 67–162)
ECHO LV MASS INDEX 2D: 74.3 G/M2 (ref 43–95)
ECHO LV POSTERIOR WALL DIASTOLIC: 0.8 CM (ref 0.6–0.9)
ECHO LVOT DIAM: 1.89 CM
ECHO LVOT PEAK GRADIENT: 6.22 MMHG
ECHO LVOT PEAK VELOCITY: 124.71 CM/S
ECHO MV A VELOCITY: 86.3 CENTIMETER/SECOND
ECHO MV E DECELERATION TIME (DT): 187.78 MS
ECHO MV E VELOCITY: 63.59 CENTIMETER/SECOND
ECHO PV PEAK INSTANTANEOUS GRADIENT SYSTOLIC: 3.13 MMHG
ECHO PV REGURGITANT MAX VELOCITY: 88.41 CM/S
ECHO RIGHT VENTRICULAR SYSTOLIC PRESSURE (RVSP): 31.08 MMHG
ECHO RV INTERNAL DIMENSION: 3.37 CM
ECHO RV TAPSE: 2.45 CM (ref 1.5–2)
ECHO TV REGURGITANT MAX VELOCITY: 240.2 CM/S
ECHO TV REGURGITANT PEAK GRADIENT: 23.08 MMHG
EOSINOPHIL # BLD: 0.2 K/UL (ref 0–0.4)
EOSINOPHIL NFR BLD: 4 % (ref 0–7)
ERYTHROCYTE [DISTWIDTH] IN BLOOD BY AUTOMATED COUNT: 13.6 % (ref 11.5–14.5)
FERRITIN SERPL-MCNC: 6200 NG/ML (ref 8–252)
GLOBULIN SER CALC-MCNC: 3.2 G/DL (ref 2–4)
GLUCOSE SERPL-MCNC: 92 MG/DL (ref 65–100)
HCT VFR BLD AUTO: 36 % (ref 35–47)
HGB BLD-MCNC: 13.2 G/DL (ref 11.5–16)
IMM GRANULOCYTES # BLD AUTO: 0 K/UL (ref 0–0.04)
IMM GRANULOCYTES NFR BLD AUTO: 0 % (ref 0–0.5)
INR PPP: 1.3 (ref 0.9–1.1)
IRON SATN MFR SERPL: 93 % (ref 20–50)
IRON SERPL-MCNC: 256 UG/DL (ref 35–150)
LA VOL DISK BP: 46.12 ML (ref 22–52)
LYMPHOCYTES # BLD: 1.4 K/UL (ref 0.8–3.5)
LYMPHOCYTES NFR BLD: 34 % (ref 12–49)
MAGNESIUM SERPL-MCNC: 2.1 MG/DL (ref 1.6–2.4)
MCH RBC QN AUTO: 28.4 PG (ref 26–34)
MCHC RBC AUTO-ENTMCNC: 36.7 G/DL (ref 30–36.5)
MCV RBC AUTO: 77.4 FL (ref 80–99)
MONOCYTES # BLD: 0.7 K/UL (ref 0–1)
MONOCYTES NFR BLD: 18 % (ref 5–13)
NEUTS SEG # BLD: 1.8 K/UL (ref 1.8–8)
NEUTS SEG NFR BLD: 43 % (ref 32–75)
NRBC # BLD: 0 K/UL (ref 0–0.01)
NRBC BLD-RTO: 0 PER 100 WBC
PHOSPHATE SERPL-MCNC: 2.8 MG/DL (ref 2.6–4.7)
PLATELET # BLD AUTO: 224 K/UL (ref 150–400)
PMV BLD AUTO: 9.7 FL (ref 8.9–12.9)
POTASSIUM SERPL-SCNC: 3.6 MMOL/L (ref 3.5–5.1)
PROT SERPL-MCNC: 6.2 G/DL (ref 6.4–8.2)
PROTHROMBIN TIME: 13 SEC (ref 9–11.1)
RBC # BLD AUTO: 4.65 M/UL (ref 3.8–5.2)
SODIUM SERPL-SCNC: 133 MMOL/L (ref 136–145)
TIBC SERPL-MCNC: 274 UG/DL (ref 250–450)
WBC # BLD AUTO: 4.1 K/UL (ref 3.6–11)

## 2020-10-22 PROCEDURE — 74011250636 HC RX REV CODE- 250/636: Performed by: INTERNAL MEDICINE

## 2020-10-22 PROCEDURE — 84100 ASSAY OF PHOSPHORUS: CPT

## 2020-10-22 PROCEDURE — 86644 CMV ANTIBODY: CPT

## 2020-10-22 PROCEDURE — 80053 COMPREHEN METABOLIC PANEL: CPT

## 2020-10-22 PROCEDURE — 82390 ASSAY OF CERULOPLASMIN: CPT

## 2020-10-22 PROCEDURE — 77030003666 HC NDL SPINAL BD -A

## 2020-10-22 PROCEDURE — 93306 TTE W/DOPPLER COMPLETE: CPT

## 2020-10-22 PROCEDURE — 74011250637 HC RX REV CODE- 250/637: Performed by: FAMILY MEDICINE

## 2020-10-22 PROCEDURE — 83516 IMMUNOASSAY NONANTIBODY: CPT

## 2020-10-22 PROCEDURE — 74011250637 HC RX REV CODE- 250/637: Performed by: INTERNAL MEDICINE

## 2020-10-22 PROCEDURE — 74011000250 HC RX REV CODE- 250

## 2020-10-22 PROCEDURE — 85610 PROTHROMBIN TIME: CPT

## 2020-10-22 PROCEDURE — 77030014115

## 2020-10-22 PROCEDURE — 99153 MOD SED SAME PHYS/QHP EA: CPT

## 2020-10-22 PROCEDURE — 76942 ECHO GUIDE FOR BIOPSY: CPT

## 2020-10-22 PROCEDURE — 77030003503 HC NDL BIOP TISS BD -B

## 2020-10-22 PROCEDURE — 86664 EPSTEIN-BARR NUCLEAR ANTIGEN: CPT

## 2020-10-22 PROCEDURE — 85025 COMPLETE CBC W/AUTO DIFF WBC: CPT

## 2020-10-22 PROCEDURE — 82728 ASSAY OF FERRITIN: CPT

## 2020-10-22 PROCEDURE — 86038 ANTINUCLEAR ANTIBODIES: CPT

## 2020-10-22 PROCEDURE — 93306 TTE W/DOPPLER COMPLETE: CPT | Performed by: INTERNAL MEDICINE

## 2020-10-22 PROCEDURE — 65270000029 HC RM PRIVATE

## 2020-10-22 PROCEDURE — 81256 HFE GENE: CPT

## 2020-10-22 PROCEDURE — 88313 SPECIAL STAINS GROUP 2: CPT

## 2020-10-22 PROCEDURE — 0FB03ZX EXCISION OF LIVER, PERCUTANEOUS APPROACH, DIAGNOSTIC: ICD-10-PCS | Performed by: RADIOLOGY

## 2020-10-22 PROCEDURE — 99152 MOD SED SAME PHYS/QHP 5/>YRS: CPT

## 2020-10-22 PROCEDURE — 83540 ASSAY OF IRON: CPT

## 2020-10-22 PROCEDURE — 36415 COLL VENOUS BLD VENIPUNCTURE: CPT

## 2020-10-22 PROCEDURE — 88307 TISSUE EXAM BY PATHOLOGIST: CPT

## 2020-10-22 PROCEDURE — 83735 ASSAY OF MAGNESIUM: CPT

## 2020-10-22 RX ORDER — LIDOCAINE HYDROCHLORIDE 10 MG/ML
5 INJECTION, SOLUTION EPIDURAL; INFILTRATION; INTRACAUDAL; PERINEURAL ONCE
Status: COMPLETED | OUTPATIENT
Start: 2020-10-22 | End: 2020-10-22

## 2020-10-22 RX ORDER — SODIUM BICARBONATE 42 MG/ML
INJECTION, SOLUTION INTRAVENOUS
Status: COMPLETED
Start: 2020-10-22 | End: 2020-10-22

## 2020-10-22 RX ORDER — MIDAZOLAM HYDROCHLORIDE 1 MG/ML
.5-5 INJECTION, SOLUTION INTRAMUSCULAR; INTRAVENOUS
Status: DISCONTINUED | OUTPATIENT
Start: 2020-10-22 | End: 2020-10-22

## 2020-10-22 RX ORDER — FENTANYL CITRATE 50 UG/ML
25-100 INJECTION, SOLUTION INTRAMUSCULAR; INTRAVENOUS
Status: DISCONTINUED | OUTPATIENT
Start: 2020-10-22 | End: 2020-10-22

## 2020-10-22 RX ADMIN — POTASSIUM CHLORIDE, DEXTROSE MONOHYDRATE AND SODIUM CHLORIDE 75 ML/HR: 150; 5; 450 INJECTION, SOLUTION INTRAVENOUS at 05:41

## 2020-10-22 RX ADMIN — POTASSIUM CHLORIDE, DEXTROSE MONOHYDRATE AND SODIUM CHLORIDE 75 ML/HR: 150; 5; 450 INJECTION, SOLUTION INTRAVENOUS at 21:55

## 2020-10-22 RX ADMIN — LOSARTAN POTASSIUM 25 MG: 50 TABLET, FILM COATED ORAL at 21:54

## 2020-10-22 RX ADMIN — FENTANYL CITRATE 25 MCG: 50 INJECTION, SOLUTION INTRAMUSCULAR; INTRAVENOUS at 12:12

## 2020-10-22 RX ADMIN — MIDAZOLAM HYDROCHLORIDE 1 MG: 1 INJECTION, SOLUTION INTRAMUSCULAR; INTRAVENOUS at 12:23

## 2020-10-22 RX ADMIN — MIDAZOLAM HYDROCHLORIDE 1 MG: 1 INJECTION, SOLUTION INTRAMUSCULAR; INTRAVENOUS at 12:11

## 2020-10-22 RX ADMIN — LIDOCAINE HYDROCHLORIDE 5 ML: 10 INJECTION, SOLUTION EPIDURAL; INFILTRATION; INTRACAUDAL; PERINEURAL at 12:27

## 2020-10-22 RX ADMIN — FENTANYL CITRATE 25 MCG: 50 INJECTION, SOLUTION INTRAMUSCULAR; INTRAVENOUS at 12:20

## 2020-10-22 RX ADMIN — ACETAMINOPHEN 650 MG: 325 TABLET ORAL at 13:25

## 2020-10-22 RX ADMIN — SODIUM BICARBONATE 210 MG: 42 INJECTION, SOLUTION INTRAVENOUS at 12:26

## 2020-10-22 RX ADMIN — FAMOTIDINE 20 MG: 20 TABLET, FILM COATED ORAL at 17:50

## 2020-10-22 NOTE — PROGRESS NOTES
[facility]  [address] [facility], [place], [address] [place] [address]  [phone]      Medical Progress Note      NAME: Elvia Elias   :  1955  MRM:  [id number]    Date/Time: 10/22/2020        Assessment / Plan:     73 yo F w/ hx of HTN presenting with fatigue, scleral icterus, jaundice, found to have severe transaminitis and hyperbilirubinemia. Severe transaminitis / Hyperbilirubinemia: MRCP showed no mass or intra/extrabiliary ductal dilation. Iron level, iron %sat elevated, along with marked elevation in ferritin. HFE mutation sent. Underwent liver biopsy ordered by GI today. Suspect hemochromatosis. Check TTE to rule out cardiac involvement. Will discuss with hematology re: therapeutic phletobotomy        Fatigue / Nausea: due to the above. Feeling better. Supportive care. Treat as above      Hypertension (): BP well controlled on losartan which we will continue       Hyponatremia: mild, improved, may be 2/2 IVVD. Follow BMP      Total time spent: 40 minutes  Time spent in the care of this patient including reviewing records, discussing with nursing and/or other providers on the treatment team, obtaining history and examining the patient, and discussing treatment plans. Care Plan discussed with: Patient, Nursing Staff and >50% of time spent in counseling and coordination of care    Discussed:  Care Plan and D/C Planning    Prophylaxis:  Lovenox    Disposition:  Home w/Family         Subjective:     Chief Complaint:  Follow up fatigue    Chart/notes/labs/studies reviewed, patient examined. Feels better. No fever. Appetite better. No abdominal pain          Objective:       Vitals:        Last 24hrs VS reviewed since prior progress note.  Most recent are:    Visit Vitals  /65 (BP 1 Location: Right arm, BP Patient Position: Head of bed elevated (Comment degrees))   Pulse 66   Temp 97.3 °F (36.3 °C)   Resp 18   Ht 5' 5\" (1.651 m)   Wt 74.8 kg (164 lb 14.5 oz)   SpO2 96%   BMI 27.44 kg/m²     SpO2 Readings from Last 6 Encounters:   10/22/20 96%   12/05/18 100%   06/02/15 97%   07/06/14 100%   12/04/13 99%   05/23/13 100%            Intake/Output Summary (Last 24 hours) at 10/22/2020 1418  Last data filed at 10/22/2020 0851  Gross per 24 hour   Intake 120 ml   Output    Net 120 ml          Exam:     Physical Exam:    Gen:  Well-developed, well-nourished, in no acute distress. Very pleasant   HEENT:  Atraumatic, normocephalic. Sclerae icteric, hearing intact to voice  Neck:  Supple, without apparent masses. Resp:  No accessory muscle use, CTAB without wheezes, rales, or rhonchi  Card: RRR, without m/r/g. No LE edema. Abd:  +bowel sounds, soft, NTTP, nondistended. No HSM. Neuro: Face symmetric, speech fluent, follows commands appropriately, no focal weakness or numbness noted. Psych:  Alert, oriented x 3.  Good insight     Medications Reviewed: (see below)    Lab Data Reviewed: (see below)    ______________________________________________________________________    Medications:     Current Facility-Administered Medications   Medication Dose Route Frequency    losartan (COZAAR) tablet 25 mg  25 mg Oral QHS    acetaminophen (TYLENOL) tablet 650 mg  650 mg Oral Q6H PRN    Or    acetaminophen (TYLENOL) suppository 650 mg  650 mg Rectal Q6H PRN    polyethylene glycol (MIRALAX) packet 17 g  17 g Oral DAILY PRN    ondansetron (ZOFRAN ODT) tablet 4 mg  4 mg Oral Q8H PRN    Or    ondansetron (ZOFRAN) injection 4 mg  4 mg IntraVENous Q6H PRN    famotidine (PEPCID) tablet 20 mg  20 mg Oral BID    enoxaparin (LOVENOX) injection 40 mg  40 mg SubCUTAneous DAILY    dextrose 5% - 0.45% NaCl with KCl 20 mEq/L infusion  75 mL/hr IntraVENous CONTINUOUS            Lab Review:     Recent Labs     10/22/20  0524 10/21/20  0704 10/20/20  2118   WBC 4.1 4.5 5.4   HGB 13.2 13.4 13.0   HCT 36.0 36.1 35.7    205 206     Recent Labs     10/22/20  0524 10/21/20  0704 10/20/20  2118   * 136 128*   K 3.6 3.9 3.8    104 93*   CO2 27 26 28   GLU 92 84 92   BUN 5* 6 9   CREA 0.65 0.76 0.87   CA 8.0* 8.2* 8.7   MG 2.1 2.1  --    PHOS 2.8  --   --    ALB 3.0* 3.0* 3.2*   ALT 2,863* 3,045* 3,309*   INR 1.3*  --   --      No components found for: GLPOC  No results for input(s): PH, PCO2, PO2, HCO3, FIO2 in the last 72 hours.   Recent Labs     10/22/20  0524   INR 1.3*     No results found for: SDES  No results found for: CULT           ___________________________________________________    Attending Physician: Wilber Sutherland MD

## 2020-10-22 NOTE — PROGRESS NOTES
Sent add'l blood work to workup severe hepatitis. Iron level high. Iron %saturation high. Ferritin pending. Possible hemochromatosis. Send lab for HFE mutation.   Will discuss with GI.

## 2020-10-22 NOTE — PROGRESS NOTES
Bedside shift change report given to Doretha (oncoming nurse) by Jenise Garcia (offgoing nurse). Report included the following information SBAR, Kardex, MAR and Recent Results.

## 2020-10-22 NOTE — H&P
Interventional Radiology History and Physical (Inpatient)    Patient: Henrietta King 72 y.o. female     Referring Physician:  No ref. provider found    Chief Complaint: Abnormal Lab Results and Nausea      History of Present Illness: conscious sedation (Versed and fentanyl) for liver biopsy    History:  Past Medical History:   Diagnosis Date    Bilateral dry eyes     Herpes simplex     Includes herpes labialis    History of mammogram 10/13/2016; 11/13/18; 01/03/2020    negative; Negative; negative birads 1 dense    Hx of mammogram 09/30/14; 10/1/15    normal; negative    Hypertension     Pap smear for cervical cancer screening 9/14/10; 9/18/12; 10/1/15; 11/13/18    Negative, HPV negative; Negative; Negative, HPV negative; Neg/Neg HPV     Family History   Problem Relation Age of Onset    Hypertension Mother     Arthritis-osteo Mother     Osteoporosis Mother     Cancer Mother 80        lymphoma    Hypertension Father     Hypertension Sister     Arthritis-osteo Sister     Hypertension Brother     Cancer Maternal Uncle      Social History     Socioeconomic History    Marital status:      Spouse name: Not on file    Number of children: Not on file    Years of education: Not on file    Highest education level: Not on file   Occupational History    Not on file   Social Needs    Financial resource strain: Not on file    Food insecurity     Worry: Not on file     Inability: Not on file    Transportation needs     Medical: Not on file     Non-medical: Not on file   Tobacco Use    Smoking status: Never Smoker    Smokeless tobacco: Never Used   Substance and Sexual Activity    Alcohol use:  Yes     Alcohol/week: 5.0 standard drinks     Types: 5 Glasses of wine per week     Comment: occasional wine    Drug use: No    Sexual activity: Yes     Partners: Male     Birth control/protection: Surgical   Lifestyle    Physical activity     Days per week: Not on file     Minutes per session: Not on file    Stress: Not on file   Relationships    Social connections     Talks on phone: Not on file     Gets together: Not on file     Attends Restorationist service: Not on file     Active member of club or organization: Not on file     Attends meetings of clubs or organizations: Not on file     Relationship status: Not on file    Intimate partner violence     Fear of current or ex partner: Not on file     Emotionally abused: Not on file     Physically abused: Not on file     Forced sexual activity: Not on file   Other Topics Concern    Not on file   Social History Narrative    Not on file       Allergies: Allergies   Allergen Reactions    Sulfa (Sulfonamide Antibiotics) Unknown (comments)     Pt denies    Iodine Itching       Current Medications:  Current Facility-Administered Medications   Medication Dose Route Frequency    midazolam (PF) (VERSED) injection 0.5-5 mg  0.5-5 mg IntraVENous RAD PRN    fentaNYL citrate (PF) injection  mcg   mcg IntraVENous Rad Multiple    sodium bicarbonate (4.2%) 4.2 % injection        losartan (COZAAR) tablet 25 mg  25 mg Oral QHS    acetaminophen (TYLENOL) tablet 650 mg  650 mg Oral Q6H PRN    Or    acetaminophen (TYLENOL) suppository 650 mg  650 mg Rectal Q6H PRN    polyethylene glycol (MIRALAX) packet 17 g  17 g Oral DAILY PRN    ondansetron (ZOFRAN ODT) tablet 4 mg  4 mg Oral Q8H PRN    Or    ondansetron (ZOFRAN) injection 4 mg  4 mg IntraVENous Q6H PRN    famotidine (PEPCID) tablet 20 mg  20 mg Oral BID    enoxaparin (LOVENOX) injection 40 mg  40 mg SubCUTAneous DAILY    dextrose 5% - 0.45% NaCl with KCl 20 mEq/L infusion  75 mL/hr IntraVENous CONTINUOUS        Physical Exam:  Blood pressure 131/73, pulse 66, temperature 97.3 °F (36.3 °C), resp. rate 16, height 5' 5\" (1.651 m), weight 74.8 kg (164 lb 14.5 oz), SpO2 98 %.   GENERAL: alert, cooperative, no distress, appears stated age, LUNG: clear to auscultation bilaterally, HEART: regular rate and rhythm, SKIN: jaundice, scleral icterus    Findings/Diagnosis: conscious sedation (Versed and fentanyl) for liver biopsy    Alerts:    Hospital Problems  Date Reviewed: 10/20/2020          Codes Class Noted POA    Hypertension ICD-10-CM: I10  ICD-9-CM: 401.9  Unknown Yes        Hyperbilirubinemia ICD-10-CM: E80.6  ICD-9-CM: 782.4  10/20/2020 Yes        LFT elevation ICD-10-CM: R79.89  ICD-9-CM: 790.6  10/20/2020 Yes        Fatigue ICD-10-CM: R53.83  ICD-9-CM: 780.79  10/20/2020 Yes        Nausea ICD-10-CM: R11.0  ICD-9-CM: 787.02  10/20/2020 Yes        Hyponatremia ICD-10-CM: E87.1  ICD-9-CM: 276.1  10/20/2020 Yes              Laboratory:      Recent Labs     10/22/20  0524   HGB 13.2   HCT 36.0   WBC 4.1      INR 1.3*   BUN 5*   CREA 0.65   K 3.6         Plan of Care/Planned Procedure:  Risks, benefits, and alternatives reviewed with patient and she agrees to proceed with the procedure. Full dictated report to follow. History and Physical

## 2020-10-22 NOTE — PROGRESS NOTES
0745: Bedside shift change report given to MONTRELL Cunningham (oncoming nurse) by Abigail Esparza RN (offgoing nurse). Report included the following information SBAR, Kardex, Procedure Summary, Intake/Output, Recent Results and Med Rec Status.     Problem: Patient Education: Go to Patient Education Activity  Goal: Patient/Family Education  Outcome: Progressing Towards Goal     Problem: Falls - Risk of  Goal: *Absence of Falls  Description: Document Edmonia Morning Fall Risk and appropriate interventions in the flowsheet.   Outcome: Progressing Towards Goal  Note: Fall Risk Interventions:            Medication Interventions: Teach patient to arise slowly

## 2020-10-22 NOTE — PROGRESS NOTES
1140 Pt placed in Ultrasound for liver biopsy. She's A&0x3 with no C/O pain, ASA 2 Airway 2, lungs clear bi lat, S1 &S2 NSR.   1200 Dr. Jd Griffith at bedside and obtained informend consent. 1205 Time Out performed, Sedation given. Liver BX obtained and sent to lab.   1232 Total sedation Versed 2 mg and Fent. 50 mcg. Rt upper abd band aid placed. Pt taken back to Rad holding for monitoring. 1240 Pt has no C/O pain. 1310 TRANSFER - OUT REPORT:    Verbal report given to Ana (name) on Jairon Dolan  being transferred to [facility](unit) for routine progression of care       Report consisted of patients Situation, Background, Assessment and   Recommendations(SBAR). Information from the following report(s) SBAR and Procedure Summary was reviewed with the receiving nurse. Lines:   Peripheral IV 10/20/20 Left Antecubital (Active)   Site Assessment Clean, dry, & intact 10/22/20 0851   Phlebitis Assessment 0 10/22/20 0851   Infiltration Assessment 0 10/22/20 0851   Dressing Status Clean, dry, & intact 10/22/20 0851   Dressing Type Transparent;Tape 10/22/20 0851   Hub Color/Line Status Pink;Patent; Infusing 10/22/20 0851   Action Taken Open ports on tubing capped 10/22/20 0851   Alcohol Cap Used Yes 10/22/20 0851        Opportunity for questions and clarification was provided.       Patient transported with:   [employer]

## 2020-10-22 NOTE — PROGRESS NOTES
Gastrointestinal Progress Note    10/22/2020    Admit Date: 10/20/2020    Subjective:     New Complaints Today:  No: reviewed MRCP with radiologist MD; no biliary obstruction, mass lesions, hepatic parenchymal abn    Pain: Patient complains of abdominal pain no. Current Facility-Administered Medications   Medication Dose Route Frequency    losartan (COZAAR) tablet 25 mg  25 mg Oral QHS    acetaminophen (TYLENOL) tablet 650 mg  650 mg Oral Q6H PRN    Or    acetaminophen (TYLENOL) suppository 650 mg  650 mg Rectal Q6H PRN    polyethylene glycol (MIRALAX) packet 17 g  17 g Oral DAILY PRN    ondansetron (ZOFRAN ODT) tablet 4 mg  4 mg Oral Q8H PRN    Or    ondansetron (ZOFRAN) injection 4 mg  4 mg IntraVENous Q6H PRN    famotidine (PEPCID) tablet 20 mg  20 mg Oral BID    enoxaparin (LOVENOX) injection 40 mg  40 mg SubCUTAneous DAILY    dextrose 5% - 0.45% NaCl with KCl 20 mEq/L infusion  75 mL/hr IntraVENous CONTINUOUS        Objective:     Blood pressure 121/70, pulse 69, temperature 97.3 °F (36.3 °C), resp. rate 18, height 5' 5\" (1.651 m), weight 74.8 kg (164 lb 14.5 oz), SpO2 98 %. No intake/output data recorded.     10/20 1901 - 10/22 0700  In: 300 [P.O.:300]  Out: -     EXAM:  GENERAL: alert, cooperative, no distress, HEART: regular rate and rhythm, LUNGS: chest clear, no wheezing, rales, normal symmetric air entry, ABDOMEN:  Bowel sounds are normal, liver is not enlarged, spleen is not enlarged and EXTREMITY: no edema      Data Review    Recent Results (from the past 24 hour(s))   CBC WITH AUTOMATED DIFF    Collection Time: 10/22/20  5:24 AM   Result Value Ref Range    WBC 4.1 3.6 - 11.0 K/uL    RBC 4.65 3.80 - 5.20 M/uL    HGB 13.2 11.5 - 16.0 g/dL    HCT 36.0 35.0 - 47.0 %    MCV 77.4 (L) 80.0 - 99.0 FL    MCH 28.4 26.0 - 34.0 PG    MCHC 36.7 (H) 30.0 - 36.5 g/dL    RDW 13.6 11.5 - 14.5 %    PLATELET 266 354 - 912 K/uL    MPV 9.7 8.9 - 12.9 FL    NRBC 0.0 0  WBC    ABSOLUTE NRBC 0.00 0.00 - 0.01 K/uL    NEUTROPHILS 43 32 - 75 %    LYMPHOCYTES 34 12 - 49 %    MONOCYTES 18 (H) 5 - 13 %    EOSINOPHILS 4 0 - 7 %    BASOPHILS 1 0 - 1 %    IMMATURE GRANULOCYTES 0 0.0 - 0.5 %    ABS. NEUTROPHILS 1.8 1.8 - 8.0 K/UL    ABS. LYMPHOCYTES 1.4 0.8 - 3.5 K/UL    ABS. MONOCYTES 0.7 0.0 - 1.0 K/UL    ABS. EOSINOPHILS 0.2 0.0 - 0.4 K/UL    ABS. BASOPHILS 0.1 0.0 - 0.1 K/UL    ABS. IMM. GRANS. 0.0 0.00 - 0.04 K/UL    DF AUTOMATED     METABOLIC PANEL, COMPREHENSIVE    Collection Time: 10/22/20  5:24 AM   Result Value Ref Range    Sodium 133 (L) 136 - 145 mmol/L    Potassium 3.6 3.5 - 5.1 mmol/L    Chloride 101 97 - 108 mmol/L    CO2 27 21 - 32 mmol/L    Anion gap 5 5 - 15 mmol/L    Glucose 92 65 - 100 mg/dL    BUN 5 (L) 6 - 20 MG/DL    Creatinine 0.65 0.55 - 1.02 MG/DL    BUN/Creatinine ratio 8 (L) 12 - 20      GFR est AA >60 >60 ml/min/1.73m2    GFR est non-AA >60 >60 ml/min/1.73m2    Calcium 8.0 (L) 8.5 - 10.1 MG/DL    Bilirubin, total 9.5 (H) 0.2 - 1.0 MG/DL    ALT (SGPT) 2,863 (H) 12 - 78 U/L    AST (SGOT) 1,955 (H) 15 - 37 U/L    Alk. phosphatase 162 (H) 45 - 117 U/L    Protein, total 6.2 (L) 6.4 - 8.2 g/dL    Albumin 3.0 (L) 3.5 - 5.0 g/dL    Globulin 3.2 2.0 - 4.0 g/dL    A-G Ratio 0.9 (L) 1.1 - 2.2     PROTHROMBIN TIME + INR    Collection Time: 10/22/20  5:24 AM   Result Value Ref Range    INR 1.3 (H) 0.9 - 1.1      Prothrombin time 13.0 (H) 9.0 - 11.1 sec   MAGNESIUM    Collection Time: 10/22/20  5:24 AM   Result Value Ref Range    Magnesium 2.1 1.6 - 2.4 mg/dL   PHOSPHORUS    Collection Time: 10/22/20  5:24 AM   Result Value Ref Range    Phosphorus 2.8 2.6 - 4.7 MG/DL     Viral hepatitis serologies negative  Assessment:     Active Problems:    Hypertension ()      Hyperbilirubinemia (10/20/2020)      LFT elevation (10/20/2020)      Fatigue (10/20/2020)      Nausea (10/20/2020)      Hyponatremia (10/20/2020)        Plan:     1. Cancel surgical consult  2.   Discussed liver biopsy, alternatives complications; all questions answered

## 2020-10-22 NOTE — PROGRESS NOTES
Transition of Care Plan:  RUR-12%  1. CM following  2. PT/OT to evaluate for any needs  3. Patient to discharge home when stable with PCP follow-up on Tuesday, 10/27  4.   to transport at discharge.   MONTRELL Madden

## 2020-10-23 VITALS
SYSTOLIC BLOOD PRESSURE: 139 MMHG | HEART RATE: 64 BPM | OXYGEN SATURATION: 99 % | DIASTOLIC BLOOD PRESSURE: 76 MMHG | TEMPERATURE: 98 F | WEIGHT: 164 LBS | BODY MASS INDEX: 27.32 KG/M2 | RESPIRATION RATE: 16 BRPM | HEIGHT: 65 IN

## 2020-10-23 LAB
ACTIN IGG SERPL-ACNC: 25 UNITS (ref 0–19)
ALBUMIN SERPL-MCNC: 2.7 G/DL (ref 3.5–5)
ALBUMIN/GLOB SERPL: 0.9 {RATIO} (ref 1.1–2.2)
ALP SERPL-CCNC: 151 U/L (ref 45–117)
ALT SERPL-CCNC: 2669 U/L (ref 12–78)
ANA SER QL: NEGATIVE
ANION GAP SERPL CALC-SCNC: 5 MMOL/L (ref 5–15)
AST SERPL-CCNC: 1761 U/L (ref 15–37)
ATRIAL RATE: 66 BPM
BILIRUB DIRECT SERPL-MCNC: 6.6 MG/DL (ref 0–0.2)
BILIRUB INDIRECT SERPL-MCNC: 2.5 MG/DL (ref 0–1.1)
BILIRUB SERPL-MCNC: 9.1 MG/DL (ref 0.2–1)
BILIRUB SERPL-MCNC: 9.2 MG/DL (ref 0.2–1)
BNP SERPL-MCNC: 155 PG/ML
BUN SERPL-MCNC: 5 MG/DL (ref 6–20)
BUN/CREAT SERPL: 8 (ref 12–20)
CALCIUM SERPL-MCNC: 8 MG/DL (ref 8.5–10.1)
CALCULATED P AXIS, ECG09: 10 DEGREES
CALCULATED R AXIS, ECG10: -4 DEGREES
CALCULATED T AXIS, ECG11: 75 DEGREES
CERULOPLASMIN SERPL-MCNC: 30.2 MG/DL (ref 19–39)
CHLORIDE SERPL-SCNC: 105 MMOL/L (ref 97–108)
CMV IGG SERPL IA-ACNC: >10 U/ML (ref 0–0.59)
CMV IGM SERPL IA-ACNC: 34 AU/ML (ref 0–29.9)
CO2 SERPL-SCNC: 27 MMOL/L (ref 21–32)
CREAT SERPL-MCNC: 0.61 MG/DL (ref 0.55–1.02)
DIAGNOSIS, 93000: NORMAL
EBV NA IGG SER-ACNC: <18 U/ML (ref 0–17.9)
EBV VCA IGG SER-ACNC: 161 U/ML (ref 0–17.9)
EBV VCA IGM SER-ACNC: <36 U/ML (ref 0–35.9)
ERYTHROCYTE [DISTWIDTH] IN BLOOD BY AUTOMATED COUNT: 14.2 % (ref 11.5–14.5)
FERRITIN SERPL-MCNC: 5297 NG/ML (ref 26–388)
GLOBULIN SER CALC-MCNC: 3.1 G/DL (ref 2–4)
GLUCOSE SERPL-MCNC: 84 MG/DL (ref 65–100)
HCT VFR BLD AUTO: 34.5 % (ref 35–47)
HGB BLD-MCNC: 12.3 G/DL (ref 11.5–16)
INR PPP: 1.3 (ref 0.9–1.1)
INTERPRETATION, 169995: ABNORMAL
MCH RBC QN AUTO: 27.6 PG (ref 26–34)
MCHC RBC AUTO-ENTMCNC: 35.7 G/DL (ref 30–36.5)
MCV RBC AUTO: 77.5 FL (ref 80–99)
MITOCHONDRIA M2 IGG SER-ACNC: <20 UNITS (ref 0–20)
NRBC # BLD: 0 K/UL (ref 0–0.01)
NRBC BLD-RTO: 0 PER 100 WBC
P-R INTERVAL, ECG05: 152 MS
PLATELET # BLD AUTO: 198 K/UL (ref 150–400)
PMV BLD AUTO: 9.7 FL (ref 8.9–12.9)
POTASSIUM SERPL-SCNC: 3.9 MMOL/L (ref 3.5–5.1)
PROT SERPL-MCNC: 5.8 G/DL (ref 6.4–8.2)
PROTHROMBIN TIME: 13.3 SEC (ref 9–11.1)
Q-T INTERVAL, ECG07: 450 MS
QRS DURATION, ECG06: 148 MS
QTC CALCULATION (BEZET), ECG08: 471 MS
RBC # BLD AUTO: 4.45 M/UL (ref 3.8–5.2)
SODIUM SERPL-SCNC: 137 MMOL/L (ref 136–145)
VENTRICULAR RATE, ECG03: 66 BPM
WBC # BLD AUTO: 4.7 K/UL (ref 3.6–11)

## 2020-10-23 PROCEDURE — 82728 ASSAY OF FERRITIN: CPT

## 2020-10-23 PROCEDURE — 82248 BILIRUBIN DIRECT: CPT

## 2020-10-23 PROCEDURE — 85610 PROTHROMBIN TIME: CPT

## 2020-10-23 PROCEDURE — 99222 1ST HOSP IP/OBS MODERATE 55: CPT | Performed by: SPECIALIST

## 2020-10-23 PROCEDURE — 85027 COMPLETE CBC AUTOMATED: CPT

## 2020-10-23 PROCEDURE — 99223 1ST HOSP IP/OBS HIGH 75: CPT | Performed by: INTERNAL MEDICINE

## 2020-10-23 PROCEDURE — 74011250637 HC RX REV CODE- 250/637: Performed by: INTERNAL MEDICINE

## 2020-10-23 PROCEDURE — 74011250636 HC RX REV CODE- 250/636: Performed by: INTERNAL MEDICINE

## 2020-10-23 PROCEDURE — 93005 ELECTROCARDIOGRAM TRACING: CPT

## 2020-10-23 PROCEDURE — 83880 ASSAY OF NATRIURETIC PEPTIDE: CPT

## 2020-10-23 PROCEDURE — 36415 COLL VENOUS BLD VENIPUNCTURE: CPT

## 2020-10-23 PROCEDURE — 80053 COMPREHEN METABOLIC PANEL: CPT

## 2020-10-23 RX ADMIN — FAMOTIDINE 20 MG: 20 TABLET, FILM COATED ORAL at 08:43

## 2020-10-23 RX ADMIN — POTASSIUM CHLORIDE, DEXTROSE MONOHYDRATE AND SODIUM CHLORIDE 75 ML/HR: 150; 5; 450 INJECTION, SOLUTION INTRAVENOUS at 11:26

## 2020-10-23 NOTE — PROGRESS NOTES
Problem: Falls - Risk of  Goal: *Absence of Falls  Description: Document Alessandro Faithard Fall Risk and appropriate interventions in the flowsheet.   Outcome: Progressing Towards Goal  Note: Fall Risk Interventions:            Medication Interventions: Teach patient to arise slowly

## 2020-10-23 NOTE — PROGRESS NOTES
Discharge medications reviewed with patient and appropriate educational materials and side effects teaching were provided. I have reviewed discharge instructions with the patient. The patient verbalized understanding. Leave smart education provided to patient. The patient verbalized understanding. AVS signed and given to patient. Peripheral IV removed. Spouse to transport home. Aware of follow up md appointments.

## 2020-10-23 NOTE — DISCHARGE SUMMARY
Physician Discharge Summary     Patient ID:  Kasie Clark  [id number]  72 y.o.  1955    Admit date: 10/20/2020    Discharge date: 10/23/2020    Admission Diagnoses: Hyperbilirubinemia [E80.6]  Hyperbilirubinemia [E80.6]    Principal Discharge Diagnoses:    Severe hepatitis  hyperbilirubinemia    OTHER PROBLEMS ADDRESSEDS  Principal Diagnosis <principal problem not specified>                                            Active Problems:    Hypertension ()      Hyperbilirubinemia (10/20/2020)      LFT elevation (10/20/2020)      Fatigue (10/20/2020)      Nausea (10/20/2020)      Hyponatremia (10/20/2020)       Patient Active Problem List   Diagnosis Code    Hypertension I10    Hyperbilirubinemia E80.6    LFT elevation R79.89    Fatigue R53.83    Nausea R11.0    Hyponatremia E87.1         Hospital Course:   Ms. Gomez Colby is a 71 yo F w/ hx of HTN presenting with fatigue, scleral icterus, jaundice, found to have severe transaminitis and hyperbilirubinemia.       Severe transaminitis / Hyperbilirubinemia: MRCP showed no mass or intra/extrabiliary ductal dilation. 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 The infiltrate is also present throughout the lobule and there is focal jeana-sinusoidal fibrosis as well as focal bile duct proliferation and some bile duct injury. A reticulin stain shows periportal and focal lobular collapse and a Prussian blue stain shows mild, focal iron deposition. These findings are not entirely diagnostic and require clinical and serological correlation.  The presence of plasma cells and eosinophils and the pattern of inflammation would suggest autoimmune hepatitis or possibly drug reaction or primary biliary cirrhosis. Other etiologies including viral infections are not excluded. CMV IgM equivocal. Iron level, iron %sat elevated, along with marked elevation in ferritin. HFE mutation sent. Hemochromatosis included in ddx. Follow up with hematology. CA 19-9 level elevated however no evidence of malignancy, suspect related to liver issues as above    I updated the patient on the path results and advised that she follow up with hepatology / Dr. Dave Hamlin, who contact information I provided. I discussed with GI Dr. Shirley Hiens who agreed with my recommendation, believing that pt's prognosis is good if she has autoimmune hepatitis and gets treated with steroids/immunosuppressants. TTE was obtained to rule out cardiac involvement. LVEF mildy depressed. No edema, dyspnea, JVD, PND etc. Evaluated by cardiology who advised no inpatient evaluation but later follow up      Fatigue / Nausea: due to the above. Feeling better. Supportive care. Treat as above       Hypertension (): BP well controlled on losartan which we will continue        Hyponatremia: mild, resolved, may be 2/2 IVVD. Pt discharged in improved and stable condition. Procedures performed: see above    Imaging studies: see above  Ct Abd Pelv Wo Cont    Result Date: 10/20/2020  IMPRESSION: 1. Periportal edema versus intrahepatic biliary ductal dilation, not well evaluated on this noncontrast examination. Consider either contrast-enhanced CT or MRI/MRCP for further evaluation. 2. Otherwise no evidence of acute process in the abdomen or pelvis. Us Guide Bx Debbie Perc    Result Date: 10/22/2020  IMPRESSION: Uncomplicated US-guided liver biopsy. Mri Abd W Mrcp W Wo Cont    Result Date: 10/22/2020  IMPRESSION: 1. No pancreatic or biliary ductal dilation. 2. No overt pancreatic or ampullary mass. 3. Small indeterminate hepatic lesion is of questionable significance. A sclerosing hemangioma is favored.            PCP: Anton Maria Lindy Monet MD    Consults: GI and Hematology/Oncology    Discharge Exam:  Patient Vitals for the past 12 hrs:   Temp Pulse Resp BP SpO2   10/23/20 0732 98 °F (36.7 °C) 64 16 139/76 99 %   10/23/20 0304 98.2 °F (36.8 °C) 66 16 125/82 97 %       GEN: NAD  CV: RRR  ABD: NTTP    Disposition: home    Patient Instructions:   Current Discharge Medication List      CONTINUE these medications which have NOT CHANGED    Details   losartan (COZAAR) 50 mg tablet Take 25 mg by mouth nightly. 1/2 tab = 25 mg       peg 400-propylene glycol (Systane, propylene glycol,) 0.4-0.3 % drop Administer 1 Drop to both eyes as needed. STOP taking these medications       TURMERIC PO Comments:   Reason for Stopping:         multivitamin (ONE A DAY) tablet Comments:   Reason for Stopping:         omega 3-dha-epa-fish oil (FISH OIL) 100-160-1,000 mg cap Comments:   Reason for Stopping:         Biotin 2,500 mcg cap Comments:   Reason for Stopping:         vit C/E/Zn/coppr/lutein/zeaxan (PRESERVISION AREDS-2 PO) Comments:   Reason for Stopping:               Activity: See discharge instructions  Diet: See discharge instructions  Wound Care: See discharge instructions    Follow-up Information     Follow up With Specialties Details Why Contact Info    Iwona Moralez MD [facility]   [address]  [address] [facility]      Silvia Santana MD Hematology and Oncology, Internal Medicine, Hematology, Oncology In 2 weeks  [address] [place] [address]  [phone]      Mega Escalera MD Gastroenterology  As needed [facility] [address]       Keri Nielsen MD Cardiology  per cardiology instructions [address] [place] [address] [id number] [place] [address] [place]  [phone]            I spent 35 minutes on this discharge. Signed:  Aman Woodward MD  10/23/2020  9:03 AM    CC: PCP Dr. Lorena Sky, hepatology Dr. Pankaj Jaimes   .

## 2020-10-23 NOTE — CONSULTS
CARDIOLOGY CONSULTATION NOTE    Az Govea MD,  [address], [place], [address] [facility]  Phone [phone]; Fax [phone]  Mobile [phone]   Voice Mail [phone]                               10/20/2020  8:06 PM  Marine Duque MD  :  1955   MRN:  [id number]     CC: elevated liver enzymes  Reason for consult: mild depressed LV fxn and mild AI  Admission Diagnosis: Hyperbilirubinemia [E80.6]; Hyperbilirubinemia [E80.6]    Marine Duque MD         ATTENTION:   This medical record was transcribed using an electronic medical records/speech recognition system. Although proofread, it may and can contain electronic, spelling and other errors. Corrections may be executed at a later time. Please feel free to contact us for any clarifications as needed. Impression Plan/Recommendation   1. Mild AI  2. Mildly reduced LV function 45%  3. Hypertension  4. Elevated LFTs  5. 5. Severely elevated ferritin               · Check EKG  · Check proBNP was normal  · I think any cardiac issues can be worked up as an outpatient I do not think any further testing is indicated during this hospital course. · I have schedule appointment for her to be seen in 3 months with an echo           Pat Leiva is a 72 y.o. female I am seeing for mildly depressed LV function and aortic insufficiency. She does have a history of hypertension  She was admitted on  experiencing decreased appetite dark-colored urine. Initial LFTs were significantly elevated and her bili was elevated as well. Patient presented to the emergency room with complaints of nausea as well as fatigue and had been feeling poorly for a while. During her work-up she had liver enzymes that were checked and they were significantly elevated and her urine was of orange color. She was admitted on the  and we are asked to see her now for chest discomfort.   CT of her pelvis showed periportal edema versus intrahepatic biliary ductal dilatation and also had MRI that was unremarkable. She states she has no cardiac history except does note that she has a history of a left bundle branch block. She had a stress test 8 to 10 years ago that was normal.  Her risk factors for heart disease are negative for diabetes, tobacco abuse unknown cholesterol profile she is postmenopausal there is a family history of CVAs. She does have hypertension and is on losartan for that. Her pregnancies were normal she states with no diabetes or hypertension.     An echocardiogram shows her EF to be 44 to 50% with mild grade 1 diastolic dysfunction mild aortic regurgitation mild tricuspid regurgitation mild pulmonary insufficiency she then had  H&H is 12.3 and 34.5 potassium is 3.9, BUN is 5 creatinine is 0.61, ALT is 2669, A ST is 1761, proBNP is 155, ferritin is 5297 total bili is 9.1 with a direct of 6.6 and an indirect 2.5    Allergies   Allergen Reactions    Sulfa (Sulfonamide Antibiotics) Unknown (comments)     Pt denies    Iodine Itching         Past Medical History:   Diagnosis Date    Bilateral dry eyes     Herpes simplex     Includes herpes labialis    History of mammogram 10/13/2016; 11/13/18; 01/03/2020    negative; Negative; negative birads 1 dense    Hx of mammogram 09/30/14; 10/1/15    normal; negative    Hypertension     Pap smear for cervical cancer screening 9/14/10; 9/18/12; 10/1/15; 11/13/18    Negative, HPV negative; Negative; Negative, HPV negative; Neg/Neg HPV        Past Surgical History:   Procedure Laterality Date    COLONOSCOPY N/A 12/5/2018    COLONOSCOPY performed by Tiffanie Juares MD at [facility] 112 HX COLONOSCOPY  2006    Wnl    HX GYN  1982    tubal ligation    LAP,TUBAL CAUTERY          . Home Medications:  Prior to Admission Medications   Prescriptions Last Dose Informant Patient Reported? Taking?    Biotin 2,500 mcg cap 10/20/2020 at am Self Yes Yes   Sig: Take 1 Cap by mouth daily. TURMERIC PO 10/14/2020 Self Yes Yes   Sig: Take 1 Cap by mouth daily. losartan (COZAAR) 50 mg tablet 10/20/2020 at am Self Yes Yes   Sig: Take 25 mg by mouth nightly. 1/2 tab = 25 mg    multivitamin (ONE A DAY) tablet 10/20/2020 at am Self Yes Yes   Sig: Take 1 Tab by mouth daily. omega 3-dha-epa-fish oil (FISH OIL) 100-160-1,000 mg cap 10/20/2020 at am Self Yes Yes   Sig: Take 1 Cap by mouth daily. peg 400-propylene glycol (Systane, propylene glycol,) 0.4-0.3 % drop  Self Yes Yes   Sig: Administer 1 Drop to both eyes as needed. vit C/E/Zn/coppr/lutein/zeaxan (PRESERVISION AREDS-2 PO) 10/14/2020 Self Yes Yes   Sig: Take 2 Tabs by mouth daily.       Facility-Administered Medications: None       Hospital Medications:  Current Facility-Administered Medications   Medication Dose Route Frequency    losartan (COZAAR) tablet 25 mg  25 mg Oral QHS    acetaminophen (TYLENOL) tablet 650 mg  650 mg Oral Q6H PRN    Or    acetaminophen (TYLENOL) suppository 650 mg  650 mg Rectal Q6H PRN    polyethylene glycol (MIRALAX) packet 17 g  17 g Oral DAILY PRN    ondansetron (ZOFRAN ODT) tablet 4 mg  4 mg Oral Q8H PRN    Or    ondansetron (ZOFRAN) injection 4 mg  4 mg IntraVENous Q6H PRN    famotidine (PEPCID) tablet 20 mg  20 mg Oral BID    enoxaparin (LOVENOX) injection 40 mg  40 mg SubCUTAneous DAILY    dextrose 5% - 0.45% NaCl with KCl 20 mEq/L infusion  75 mL/hr IntraVENous CONTINUOUS          OBJECTIVE       Laboratory and Imaging have been reviewed and are notable for            Diagnostic Tests:     Recent Labs     10/20/20  2118        Recent Labs     10/23/20  0256 10/22/20  0524 10/21/20  0704    133* 136   K 3.9 3.6 3.9   CO2 27 27 26   BUN 5* 5* 6   CREA 0.61 0.65 0.76   GLU 84 92 84   PHOS  --  2.8  --    MG  --  2.1 2.1   WBC 4.7 4.1 4.5   HGB 12.3 13.2 13.4   HCT 34.5* 36.0 36.1    224 205         Cardiac work up to date:  No specialty comments available. Social History:  Social History     Tobacco Use    Smoking status: Never Smoker    Smokeless tobacco: Never Used   Substance Use Topics    Alcohol use: Yes     Alcohol/week: 5.0 standard drinks     Types: 5 Glasses of wine per week     Comment: occasional wine       Family History:  Family History   Problem Relation Age of Onset    Hypertension Mother     Arthritis-osteo Mother     Osteoporosis Mother     Cancer Mother 80        lymphoma    Hypertension Father     Hypertension Sister     Arthritis-osteo Sister     Hypertension Brother     Cancer Maternal Uncle        Review of Symptoms:  A comprehensive review of systems was negative except for that written in the HPI. Physical Exam:      Visit Vitals  /82 (BP 1 Location: Right arm, BP Patient Position: At rest)   Pulse 66   Temp 98.2 °F (36.8 °C)   Resp 16   Ht 5' 5\" (1.651 m)   Wt 164 lb (74.4 kg)   SpO2 97%   BMI 27.29 kg/m²     General Appearance:  Well developed, well nourished,alert and oriented x 3, and individual in no acute distress. Ears/Nose/Mouth/Throat:   Hearing grossly normal.Normal oral mucosa,no scleral icterus     Neck: Supple no JVD or bruits,no cervical lymphadenopathy   Chest:   Lungs clear to auscultation bilaterally,no rales rhonchi or wheezing   Cardiovascular:  Regular rate and rhythm, S1, S2 normal,  Murmur. PMI nondisplaced   Abdomen:   Soft, non-tender, bowel sounds are active. No abdominal bruits   Extremities: No edema bilaterally. Pulses detected, no varicosities   Skin: Warm and dry. No bruising  Neuro  Moves all extermities and neurologically intact                                                       I have discussed the diagnosis with the patient and the intended plan as seen in the above orders. Questions were answered concerning future plans. I have discussed medication side effects and warnings with the patient as well.     Suha Kaba is in agreement to the plan listed above and wishes to proceed. she  was instructed not to smoke, eat heart healthy diet  and to exercise. Thank you for this consult.       Taqueria Pierson MD  ·

## 2020-10-23 NOTE — DISCHARGE INSTRUCTIONS
HOSPITALIST DISCHARGE INSTRUCTIONS  NAME: Ketan Colbert   :  1955   MRN:  [id number]     Date/Time:  10/23/2020 9:00 AM    ADMIT DATE: 10/20/2020     DISCHARGE DATE: 10/23/2020     PRINCIPAL DISCHARGE DIAGNOSES:  Severely elevated liver enzymes and bilirubin level  Suspect hemochromatosis, biopsy and genetic testing pending    MEDICATIONS:  · It is important that you take the medication exactly as they are prescribed. Note the changes and additions to your medications. Be sure you understand these changes before you are discharged today. · Keep your medication in the bottles provided by the pharmacist and keep a list of the medication names, dosages, and times to be taken in your wallet. · Do not take other medications without consulting your doctor. Pain Management: per above medications    What to do at Home    Recommended diet:  Resume previous diet, avoid high iron diet pending follow up    Recommended activity: Activity as tolerated    If you experience any of the following symptoms then please call your primary care physician or return to the emergency room if you cannot get hold of your doctor:  Fever, chills, severe abdominal pain, nausea, vomiting, diarrhea, confusion, weakness, falling, bleeding, severe chest pain, shortness of breath, dizziness, or other severe concerning symptoms    Follow Up: Follow-up Information     Follow up With Specialties Details Why Contact Info    Iwona Moralez, [address]   . [facility]      Silvia Santana MD Hematology and Oncology, Internal Medicine, Hematology, Oncology In 2 weeks  [address]  [phone]      Mega Escalera MD Gastroenterology  As needed [place] [id number] [phone]              Information obtained by :  I understand that if any problems occur once I am at home I am to contact my physician.     I understand and acknowledge receipt of the instructions indicated above. Physician's or R.N.'s Signature                                                                  Date/Time                                                                                                                                              Patient or Representative Signature                                                          Date/Time  [facility]/ED Visit Follow-Up Instructions/Information    You may have an in home follow up visit set up with [facility] or may wish to contact [facility] to set-up a visit:    What are we? [facility] is an in-home urgent care service staffed with emergency trained medical teams. We come to your home in a vehicle stocked with medical supplies and technology. An ER physician is always available if needed. When? As a part of your hospital follow-up, an appointment has been/ or can be set up for us to come see you. Usually, this will be 24-72 hours after you leave the hospital or as needed. [facility] is open 7am-9pm, 7 days a week, 365 days a year, including holidays. Why? We know that you cannot always get to your doctor after being in the hospital and that your doctor is not always available when you need them. Once your workup is complete, we'll call in your prescriptions, update your family doctor, and handle billing with your insurance so you can focus on feeling better, faster without leaving home. How much? We accept most major health insurance plans, including Medicaid, Medicare, and Medicare Advantage [address], [place], and [employer]. We also accept: credit, debit, health savings account (HSA), health reimbursement account (HRA) and flexible spending account (FSA) payments.  [facility]'s prices compare to conventional urgent care facilities, but we bring the care to you. How to reach us? Getting care is easy- use our mobile tsering ([employer]), website ([email or web address]) or call us [phone].

## 2020-10-23 NOTE — PROGRESS NOTES
Bedside shift change report given to MONTRELL Cunningham (oncoming nurse) by Aleksandar May RN (offgoing nurse). Report included the following information SBAR, Kardex, Procedure Summary, Intake/Output, Recent Results and Med Rec Status.

## 2020-10-23 NOTE — PROGRESS NOTES
CM Note:  Pt d/c'd to home transported by her . Pt to f/u with PCP on Tuesday. No CM needs.   MONTRELL Madden

## 2020-10-23 NOTE — CONSULTS
[facility] [place] [address] [facility]  Krystina Zayasughter: [phone]  F: [phone]      Reason for Visit:   Mary Dietz is a 72 y.o. female who is seen in consultation at the request of Dr. Chu Minor  for evaluation of suspect hemachromatosis/ ? Need for phlebotomy. History of Present Illness:   Ms. Wang Galvez is a 73 y/o female with HTN who was admitted with transaminitis, jaundice, found to have elevated iron levels. She presented with decreased appetite, dark-colored urine, and increased reflux x 10 days. Was seen by PCP; labs were notable for markedly elevated LFTs, and she was directed to the ED. ED labs notable for ALT 3,309, AST > 2,000, Lipase 547, T-bili 7.6. CT abdomen/pelvis without contrast showed periportal edema vs intrahepatic biliary ductal dilation; no acute process. Abdomen MRI showed no pancreatic or biliary ductal dilation; small indeterminate hepatic lesion. Patient was evaluated by Dr. Vinicio Robledo in GI and advised to undergo liver biopsy, which was done on 10/22 by IR. Pathology is pending. Hematology consult placed given labs result on 10/22 showing markedly elevated ferritin level 6,200 and high iron stores. We were consulted for concern for hemochromatosis and possible need for phlebotomy. Today, Ms. Wang Galvez reports feeling better compared to date of admission, urine has gotten lighter. Reflux has improved slightly but she has been eating light meals. States she was tested on Tues for COVID with negative results. Only new med was Prilosec added on Oct 5th for reflux; she took it for 1 week and then stopped b/c she does not like taking medications and wanted to try altering her diet to see if that would help her reflux. Also stopped taking OTC vitamins. Had not noticed that she had a jaundiced appearance. She reports following regularly with a PCP, annual labs with no prior mention of transaminitis. No significant or daily Tylenol use.  No EtOH abuse. No recent viral illness. Pt provided labs from PCP office from 10/2019:   ALT 20 AST 25 Tbili 0.7  Unaware if Fe studies had ever been done. Retired; volunteers    at bedside. Past Medical History:   Diagnosis Date    Bilateral dry eyes     Herpes simplex     Includes herpes labialis    History of mammogram 10/13/2016; 11/13/18; 01/03/2020    negative; Negative; negative birads 1 dense    Hx of mammogram 09/30/14; 10/1/15    normal; negative    Hypertension     Pap smear for cervical cancer screening 9/14/10; 9/18/12; 10/1/15; 11/13/18    Negative, HPV negative; Negative; Negative, HPV negative; Neg/Neg HPV      Past Surgical History:   Procedure Laterality Date    COLONOSCOPY N/A 12/5/2018    COLONOSCOPY performed by Ben Chicas MD at [address] [facility] HX COLONOSCOPY  2006    Wnl    HX GYN  1982    tubal ligation    LAP,TUBAL CAUTERY        Social History     Tobacco Use    Smoking status: Never Smoker    Smokeless tobacco: Never Used   Substance Use Topics    Alcohol use:  Yes     Alcohol/week: 5.0 standard drinks     Types: 5 Glasses of wine per week     Comment: occasional wine      Family History   Problem Relation Age of Onset    Hypertension Mother    Camacho.Lax Arthritis-osteo Mother     Osteoporosis Mother     Cancer Mother 80        lymphoma    Hypertension Father     Hypertension Sister     Arthritis-osteo Sister     Hypertension Brother     Cancer Maternal Uncle      Current Facility-Administered Medications   Medication Dose Route Frequency    losartan (COZAAR) tablet 25 mg  25 mg Oral QHS    acetaminophen (TYLENOL) tablet 650 mg  650 mg Oral Q6H PRN    Or    acetaminophen (TYLENOL) suppository 650 mg  650 mg Rectal Q6H PRN    polyethylene glycol (MIRALAX) packet 17 g  17 g Oral DAILY PRN    ondansetron (ZOFRAN ODT) tablet 4 mg  4 mg Oral Q8H PRN    Or    ondansetron (ZOFRAN) injection 4 mg  4 mg IntraVENous Q6H PRN    famotidine (PEPCID) tablet 20 mg  20 mg Oral BID    enoxaparin (LOVENOX) injection 40 mg  40 mg SubCUTAneous DAILY    dextrose 5% - 0.45% NaCl with KCl 20 mEq/L infusion  75 mL/hr IntraVENous CONTINUOUS      Allergies   Allergen Reactions    Sulfa (Sulfonamide Antibiotics) Unknown (comments)     Pt denies    Iodine Itching        Review of Systems: A complete review of systems was obtained, negative except as described above. Physical Exam:     Visit Vitals  /76 (BP 1 Location: Right arm, BP Patient Position: At rest)   Pulse 64   Temp 98 °F (36.7 °C)   Resp 16   Ht 5' 5\" (1.651 m)   Wt 74.4 kg (164 lb)   SpO2 99%   BMI 27.29 kg/m²     ECOG PS: 0  General: jaundice; No distress  Eyes: PERRLA, mildly icteric sclerae noted  HENT: Atraumatic with normal appearance of ears and nose; OP clear  Neck: Supple; no thyromegaly   Lymphatic: No cervical, supraclavicular, or axillary adenopathy  Respiratory: CTAB, normal respiratory effort  CV: Normal rate, regular rhythm, no murmurs, no peripheral edema  GI: Soft, nontender, nondistended, no masses, no hepatomegaly, no splenomegaly  MS:Digits without clubbing or cyanosis. Skin: No rashes, ecchymoses, or petechiae. Normal temperature, turgor, and texture. Neuro/Psych: Alert, oriented, appropriate affect, normal judgment/insight      Results:     Lab Results   Component Value Date/Time    WBC 4.7 10/23/2020 02:56 AM    HGB 12.3 10/23/2020 02:56 AM    HCT 34.5 (L) 10/23/2020 02:56 AM    PLATELET 503 39/85/3354 02:56 AM    MCV 77.5 (L) 10/23/2020 02:56 AM    ABS.  NEUTROPHILS 1.8 10/22/2020 05:24 AM     Lab Results   Component Value Date/Time    Sodium 137 10/23/2020 02:56 AM    Potassium 3.9 10/23/2020 02:56 AM    Chloride 105 10/23/2020 02:56 AM    CO2 27 10/23/2020 02:56 AM    Glucose 84 10/23/2020 02:56 AM    BUN 5 (L) 10/23/2020 02:56 AM    Creatinine 0.61 10/23/2020 02:56 AM    GFR est AA >60 10/23/2020 02:56 AM    GFR est non-AA >60 10/23/2020 02:56 AM    Calcium 8.0 (L) 10/23/2020 02:56 AM     Lab Results   Component Value Date/Time    Bilirubin, total 9.2 (H) 10/23/2020 02:56 AM    ALT (SGPT) 2,669 (H) 10/23/2020 02:56 AM    Alk. phosphatase 151 (H) 10/23/2020 02:56 AM    Protein, total 5.8 (L) 10/23/2020 02:56 AM    Albumin 2.7 (L) 10/23/2020 02:56 AM    Globulin 3.1 10/23/2020 02:56 AM     Lab Results   Component Value Date/Time    Iron % saturation 93 (H) 10/22/2020 05:24 AM    TIBC 274 10/22/2020 05:24 AM    Ferritin 5,297 (H) 10/23/2020 02:56 AM    Lipase 547 (H) 10/20/2020 09:18 PM    Hep C virus Ab Interp. NONREACTIVE 10/20/2020 11:13 PM     Lab Results   Component Value Date/Time    INR 1.3 (H) 10/23/2020 02:56 AM     Lab Results   Component Value Date/Time    CEA 0.4 10/20/2020 11:13 PM    Carbohydrate Antigen 19-9, (CA 19-9) 91 (H) 10/20/2020 11:13 PM    AFP, Serum, Tumor Marker 7.5 10/20/2020 11:13 PM       10/20/2020 CT ABD PELV WO CONT  IMPRESSION:     1. Periportal edema versus intrahepatic biliary ductal dilation, not well  evaluated on this noncontrast examination. Consider either contrast-enhanced CT  or MRI/MRCP for further evaluation. 2. Otherwise no evidence of acute process in the abdomen or pelvis.     10/21/2020 MRI ABD W MRCP W WO CONT  IMPRESSION:   1. No pancreatic or biliary ductal dilation. 2. No overt pancreatic or ampullary mass. 3. Small indeterminate hepatic lesion is of questionable significance. A  sclerosing hemangioma is favored. 10/22/2020 US GUIDE BX Liver  Path pending      Assessment and Recommendations:   71 yo female with PMHx of HTN admitted with transaminitis/hyperbilirubinemia, elevated iron stores and ferritin. 1. Elevated ferritin / iron stores:  Agree with evaluating for hemochromatosis, but I have lower suspicion given no prior history of transaminitis or iron overload as well as rarity of this condition in those with  ancestry. Pt states she is of .  Although biallelic HFE [id number] mutation is less likely, it is possible that she could have a more rare non-HFE hemochromatosis. However, it would be odd for this to develop with liver manifestations in just one year. On the other hand, no prior iron profile or ferritin available for our review. When patients with hereditary hemochromatosis start on treatment with phlebotomy, they often require 12 or more weekly phlebotomy treatments to get ferritin down to goal of < 100 range. I do not see an urgent role for phlebotomy as an inpatient. -- f/u hemochromatosis gene test  -- Hold on phlebotomy awaiting result of above lab  -- Pt to follow up liver biopsy results with GI  -- Recommend outpatient hematology follow up in 2 weeks; appt made and placed in discharge summary    2. Hepatic lesion with elevated LFTs/ Tbili:   Unclear etiology and liver pathology done on 10/22 with path pending. CA 19-9 mildly elevated. GI following. 3. Decrease in LVEF:   TTE : LVEF 45-50%; normal wall motion  Cardiology consulted: EKG, proBNP pending    4. HTN:   Moderately well controlled on current regimen. Patient seen in conjunction with Anastacio Vaughn NP.       Signed By: Yehuda Bang MD

## 2020-10-23 NOTE — PROGRESS NOTES
Liver enzymes slowly improved. Bilirubin remains elevated, no worse today. Discussed with GI and hem/onc yesterday, both advising outpatient follow up, and no phlebotomy until HFE gene test results. Anticipate DC home today.

## 2020-10-28 LAB — HFE GENE MUT ANL BLD/T: NORMAL

## 2020-10-29 ENCOUNTER — OFFICE VISIT (OUTPATIENT)
Dept: HEMATOLOGY | Age: 65
End: 2020-10-29
Payer: MEDICARE

## 2020-10-29 VITALS
TEMPERATURE: 97.8 F | HEIGHT: 65 IN | DIASTOLIC BLOOD PRESSURE: 70 MMHG | SYSTOLIC BLOOD PRESSURE: 125 MMHG | BODY MASS INDEX: 26.39 KG/M2 | WEIGHT: 158.4 LBS | OXYGEN SATURATION: 96 % | HEART RATE: 72 BPM | RESPIRATION RATE: 16 BRPM

## 2020-10-29 DIAGNOSIS — R74.8 ABNORMAL LIVER ENZYMES: Primary | ICD-10-CM

## 2020-10-29 PROCEDURE — G0463 HOSPITAL OUTPT CLINIC VISIT: HCPCS | Performed by: HOSPITALIST

## 2020-10-29 PROCEDURE — 99205 OFFICE O/P NEW HI 60 MIN: CPT | Performed by: HOSPITALIST

## 2020-10-29 NOTE — PATIENT INSTRUCTIONS
We will perform blood work today Please stop all over the counter medications We will request your biopsy slides from [facility] Return to clinic 4 weeks

## 2020-10-29 NOTE — PROGRESS NOTES
Identified pt with two pt identifiers(name and ). Reviewed record in preparation for visit and have obtained necessary documentation. Chief Complaint   Patient presents with    Elevated Liver Enzymes    New Patient      Vitals:    10/29/20 1140   BP: 125/70   Pulse: 72   Resp: 16   Temp: 97.8 °F (36.6 °C)   TempSrc: Temporal   SpO2: 96%   Weight: 158 lb 6.4 oz (71.8 kg)   Height: 5' 5\" (1.651 m)   PainSc:   0 - No pain       Health Maintenance Review: Patient reminded of \"due or due soon\" health maintenance. I have asked the patient to contact his/her primary care provider (PCP) for follow-up on his/her health maintenance. Coordination of Care Questionnaire:  :   1) Have you been to an emergency room, urgent care, or hospitalized since your last visit? If yes, where when, and reason for visit? yes     [facility] [place], [place], Doctor told her to go to the ER due to elevated enzymes    2. Have seen or consulted any other health care provider since your last visit? If yes, where when, and reason for visit? NO      Patient is accompanied by  I have received verbal consent from Chilo Ansari to discuss any/all medical information while they are present in the room.

## 2020-10-29 NOTE — PROGRESS NOTES
Jimbo Prieto [address]      Maria R Rodriguez MD, Jelani Nolasco, Jason Rowland MD, MPH      Matti Medeiros, PA-DOROTHY Baron, [facility]-BC     Anisa Kate, [facility]   Aleksandra Ba, P-C    Abril Brush, [facility]       Charles Danninatty Moises De Mccabe [address]    at [address], [address] [facility].    [phone]    FAX: [address] [place] [address] Avenue    [address], [address]    [phone]    FAX: [phone]       Patient Care Team:  Marisela Stovall MD as PCP - General (Internal Medicine)  Tomeka Warren MD as Physician (Obstetrics & Gynecology)  Roscoe Garcia MD (Breast Surgery)      Problem List  Date Reviewed: 10/29/2020          Codes Class Noted    Abnormal liver enzymes ICD-10-CM: R74.8  ICD-9-CM: 790.5  10/29/2020        Hypertension ICD-10-CM: I10  ICD-9-CM: 401.9  Unknown        Fatigue ICD-10-CM: R53.83  ICD-9-CM: 780.79  10/20/2020        Nausea ICD-10-CM: R11.0  ICD-9-CM: 787.02  10/20/2020            Addendum: 10/30/2020  Patient called to discuss labs   Labs were reviewed with patient. , AST 2,303, ALT 2,334, INR 1.2, TB 23.4. MELD 25. Plan: I will review liver bx slides with MLS but based on current readings and worsening total bilirubin and transaminases this is likely autoimmune hepatitis. We will start on prednisone 60 mg daily. Repeat MELD labs in 1 week and have patient follow up in clinic in 1 week for evaluation.     Jonny Saldana MD, MPH  Advanced Hepatology  [facility] 13 of [address], 2000 [facility].  [phone]  [address]              The clinicians listed above have asked me to see Dejah Ernandez in consultation regarding elevated liver enzymes and its management. All medical records sent by the referring physicians were reviewed including imaging studies and pathology. The patient is a 72 y.o. Black female who was found to have elevated liver enzymes in 10/2020 during recent hospital admission for severe fatigue. Liver enzymes performed during admission on 10/23 showed AST 1,761, ALT 2669, , TP 5.8, Alb 2.7. Serologic evaluation for markers of chronic liver disease was positive for ASMA 25, ferritin 6,200, iron saturation 93%. Acute hepatitis panel was negative. AMA < 20, ceruloplasmin 30.2,  CA 19-9 - 91. Imaging of the liver with CT scan without contrast performed 10/20 showed periportal edema vs intrahepatic biliary ductal dilation. MRI/MRCP of the liver with and without contrast performed 10/20 demonstrated no pancreatic or biliary dilation. No overt pancreatic or ampullary mass. Small indeterminate hepatic lesion. A liver biopsy was performed on 10/22. This demonstrated chronic hepatitis (grade 3/4, stage 2-3/4, Elvin-Constantino). Mixed inflammatory cells including lymphocytes, neutrophils with conspicuous plasma cells and eosinophils. Patient reports she was recently started on prilosec. She reports she is on women vitamins, tumeric 1000 mg, apple cidar vinegar tablet, biotin 100 mcg , Vitamin C 250 mg daily, allegra 180 mg daily. She was treated with ciprofloxacin in 08/2020 for gastroenteritis. Patient current reports fatigue, yellowness of the eyes and skin, dark urine, pale stool and poor appetite. She also notes joint pains which are widespread but improving. Patient denies abdominal pain over the liver, ascites, lower extremity swelling, confusion or problems with concentration. The patient completes all daily activities without any functional limitations.     Health maintenance  Variceal surveillance None  CRC screening:  [facility] [address] surveillance: MRI 10/20: Small indeterminate hepatic lesion  Hepatitis A serology sent  Hepatitis B serology sent  Influenza: Recommended this occur late into each fall  Pneumonia: Prevnar followed by Pneumovax after 8 weeks      ASSESSMENT AND PLAN:  Elevated liver enzymes  Persistent elevation in liver transaminases and alkaline phosphatase of unclear etiology at this time. Liver enzymes elevation mainly hepatocellular pattern  Liver transaminases are markedly elevated. ALP is elevated. Liver function is depressed. The platelet count is normal.     Based upon laboratory studies, liver biopsy the patient appears to have significant liver injury. .    Serologic testing for causes of chronic liver disease were positive for ASMA and an elevation in Ferritin and FE saturation    The most likely causes for the liver chemistry abnormalities were discussed with the patient and include immune liver disorders, medications, and a non-specific non-hepatitis virus. Will perform laboratory testing to monitor liver function and degree of liver injury. This included BMP, hepatic panel, CBC with platelet count, INR. The patient had a liver biopsy performed in 10/22 at [facility]  Will request that the liver biopsy slides sent to me for my personal review. Patient was counseled to stop all over the counter medications for now    Screening for Hepatocellular Carcinoma  HCC screening is not necessary if the patient has no evidence of cirrhosis. Imaging shows indeterminate hepatic lesions. MRI of the liver will be repeated in 6 months. AFP was negative. CA 19-9 was elevated. Treatment of other medical problems in patients with chronic liver disease  There are no contraindications for the patient to take most medications that are necessary for treatment of other medical issues. Patient should stop all over the counter medications for now    Counseling for alcohol in patients with chronic liver disease  The patient does not consume any significant amount of alcohol.     Vaccinations The need for vaccination against viral hepatitis A and B will be assessed with serologic and instituted as appropriate. Routine vaccinations against other bacterial and viral agents can be performed as indicated. Annual flu vaccination should be administered if indicated. Elevation in Ferritin  There is an elevation in ferritin with Elevated iron saturation. HFE genetic testing was negative for any known mutations    The elevation in ferritin reflects hepatic inflammation. ALLERGIES  Allergies   Allergen Reactions    Sulfa (Sulfonamide Antibiotics) Unknown (comments)     Pt denies    Iodine Itching       MEDICATIONS  Current Outpatient Medications   Medication Sig    losartan (COZAAR) 50 mg tablet Take 25 mg by mouth nightly. 1/2 tab = 25 mg     peg 400-propylene glycol (Systane, propylene glycol,) 0.4-0.3 % drop Administer 1 Drop to both eyes as needed. No current facility-administered medications for this visit. SYSTEM REVIEW NOT RELATED TO LIVER DISEASE OR REVIEWED ABOVE:  Constitution systems: Negative for fever, chills, weight gain, weight loss. Eyes: Negative for visual changes. ENT: Negative for sore throat, painful swallowing. Respiratory: Negative for cough, hemoptysis, SOB. Cardiology: Negative for chest pain, palpitations. GI:  Negative for constipation or diarrhea. : Negative for urinary frequency, dysuria, hematuria, nocturia. Skin: Negative for rash. Hematology: Negative for easy bruising, blood clots. Musculo-skelatal: Positive for weakness and joint pains  Neurologic: Negative for headaches, dizziness, vertigo, memory problems not related to HE. Psychology: Negative for anxiety, depression. FAMILY HISTORY:  The father is .  of MI  The mother is .  of recurrent stroke  There is no family history of liver disease. The patient sister has SLE      SOCIAL HISTORY:  The patient is .     The patient has 3 children and 9 grand children  The patient has never used tobacco products. The patient consumes alcohol on social occasions never in excess. The patient is retired. 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 Eyes: Sclera icteric. ENT: No oral lesions. Thyroid normal.  Nodes: No adenopathy. Skin: No spider angiomata. +++ jaundice. No palmar erythema. Respiratory: Lungs clear to auscultation. Cardiovascular: Regular heart rate. No murmurs. No JVD. Abdomen: Soft non-tender. Liver size normal to percussion/palpation. Spleen not palpable. No obvious ascites. Extremities: No edema. No muscle wasting. No gross arthritic changes. Neurologic: Alert and oriented. Cranial nerves grossly intact. No asterixis. LABORATORY STUDIES:  Recent liver function panel, CBC with platelet count and BMP are not available. These studies will be performed. Liver [place] [address] Units 10/23/2020   WBC 3.6 - 11.0 K/uL 4.7   ANC 1.8 - 8.0 K/UL    HGB 11.5 - 16.0 g/dL 12.3    - 400 K/uL 198   INR 0.9 - 1.1   1.3 (H)   AST 15 - 37 U/L 1,761 (H)   ALT 12 - 78 U/L 2,669 (H)   Alk Phos 45 - 117 U/L 151 (H)   Bili, Total 0.2 - 1.0 MG/DL 9.2 (H)   Bili, Direct 0.0 - 0.2 MG/DL    Albumin 3.5 - 5.0 g/dL 2.7 (L)   BUN 6 - 20 MG/DL 5 (L)   Creat 0.55 - 1.02 MG/DL 0.61   Na 136 - 145 mmol/L 137   K 3.5 - 5.1 mmol/L 3.9   Cl 97 - 108 mmol/L 105   CO2 21 - 32 mmol/L 27   Glucose 65 - 100 mg/dL 84   Magnesium 1.6 - 2.4 mg/dL      SEROLOGIES:  Not available or performed. Testing was performed today.   Liver Functions & Hep Serologies Latest Ref Rng & Units 10/23/2020    Hep A, IgM NR       Hep B S Ag Index     Hep B C Ab IgM NR       Serologies Latest Ref Rng & Units 10/23/2020    Hep B Surface Ag Index     Hep B Surface Ag Interp NEG       Hep C Ab NR       Ferritin 26 - 388 NG/ML 5,297 (H)    Iron % Saturation 20 - 50 %     ASMCA 0 - 19 Units     M2 Ab 0.0 - 20.0 Units     Ceruloplasmin 19.0 - 39.0 mg/dL       Liver Functions & Hep Serologies Latest Ref Rng & Units 10/22/2020 10/21/2020   Hep A, IgM NR       Hep B S Ag Index     Hep B C Ab IgM NR       Serologies Latest Ref Rng & Units 10/22/2020    Hep B Surface Ag Index     Hep B Surface Ag Interp NEG       Hep C Ab NR       Ferritin 26 - 388 NG/ML 6,200 (H)    Iron % Saturation 20 - 50 % 93 (H)    ASMCA 0 - 19 Units 25 (H)    M2 Ab 0.0 - 20.0 Units <20.0    Ceruloplasmin 19.0 - 39.0 mg/dL 30.2      Liver Functions & Hep Serologies Latest Ref Rng & Units 10/20/2020   Hep A, IgM NR   NONREACTIVE   Hep B S Ag Index <0.10   Hep B C Ab IgM NR   NONREACTIVE   Serologies Latest Ref Rng & Units 10/20/2020   Hep B Surface Ag Index <0.10   Hep B Surface Ag Interp NEG   Negative   Hep C Ab NR   NONREACTIVE   Ferritin 26 - 388 NG/ML    Iron % Saturation 20 - 50 %    ASMCA 0 - 19 Units    M2 Ab 0.0 - 20.0 Units    Ceruloplasmin 19.0 - 39.0 mg/dL        LIVER HISTOLOGY:  10/22: Liver bx:  Chronic hepatitis (grade 3/4, stage 2-3/4, Elvin-Constantino)     ENDOSCOPIC PROCEDURES:  Not available or performed    RADIOLOGY:  10/20: MRI/MRCP liver: . Small indeterminate hepatic lesion is of questionable significance. A  sclerosing hemangioma is favored. 10/20: CT scan WO Liver: Periportal edema vs intrahepatic biliary ductal dilation    OTHER TESTING:  Not available or performed    FOLLOW-UP:  All of the issues listed above in the Assessment and Plan were discussed with the patient. All questions were answered. The patient expressed a clear understanding of the above.     [facility] [place] [address] in 2-4 weeks for routine monitoring to review all data and determine the treatment Kristen Hobson MD, MPH  Advanced Hepatology  [facility] 13  [address] [address] [address], [address] [facility] 22.  201 [facility]

## 2020-10-29 NOTE — LETTER
10/29/20 Patient: Maicol Saba YOB: 1955 Date of Visit: 10/29/2020 [address], [place] 
[address] [address] VIA Facsimile: [phone] Dear [address] Nancy Atkinson MD, Thank you for referring Ms. Jairon Dolan to [address] for evaluation. My notes for this consultation are attached. If you have questions, please do not hesitate to call me. I look forward to following your patient along with you. Sincerely, Georgina Hernandez MD

## 2020-10-30 DIAGNOSIS — D59.10 AIHA (AUTOIMMUNE HEMOLYTIC ANEMIA) (HCC): Primary | ICD-10-CM

## 2020-10-30 DIAGNOSIS — K75.4 AUTOIMMUNE HEPATITIS (HCC): ICD-10-CM

## 2020-10-30 LAB
ALBUMIN SERPL-MCNC: 3.7 G/DL (ref 3.8–4.8)
ALBUMIN/GLOB SERPL: 1.4 {RATIO} (ref 1.2–2.2)
ALP SERPL-CCNC: 195 IU/L (ref 39–117)
ALT SERPL-CCNC: 2334 IU/L (ref 0–32)
AST SERPL-CCNC: 2303 IU/L (ref 0–40)
BASOPHILS # BLD AUTO: 0.1 X10E3/UL (ref 0–0.2)
BASOPHILS NFR BLD AUTO: 1 %
BILIRUB SERPL-MCNC: 23.4 MG/DL (ref 0–1.2)
BUN SERPL-MCNC: 5 MG/DL (ref 8–27)
BUN/CREAT SERPL: 6 (ref 12–28)
CALCIUM SERPL-MCNC: 9 MG/DL (ref 8.7–10.3)
CHLORIDE SERPL-SCNC: 92 MMOL/L (ref 96–106)
CO2 SERPL-SCNC: 24 MMOL/L (ref 20–29)
CREAT SERPL-MCNC: 0.79 MG/DL (ref 0.57–1)
EOSINOPHIL # BLD AUTO: 0.2 X10E3/UL (ref 0–0.4)
EOSINOPHIL NFR BLD AUTO: 3 %
ERYTHROCYTE [DISTWIDTH] IN BLOOD BY AUTOMATED COUNT: 18.4 % (ref 11.7–15.4)
GLOBULIN SER CALC-MCNC: 2.7 G/DL (ref 1.5–4.5)
GLUCOSE SERPL-MCNC: 89 MG/DL (ref 65–99)
HCT VFR BLD AUTO: 38.5 % (ref 34–46.6)
HGB BLD-MCNC: 14 G/DL (ref 11.1–15.9)
IMM GRANULOCYTES # BLD AUTO: 0 X10E3/UL (ref 0–0.1)
IMM GRANULOCYTES NFR BLD AUTO: 0 %
INR PPP: 1.2 (ref 0.9–1.2)
LYMPHOCYTES # BLD AUTO: 1.8 X10E3/UL (ref 0.7–3.1)
LYMPHOCYTES NFR BLD AUTO: 33 %
MCH RBC QN AUTO: 28.5 PG (ref 26.6–33)
MCHC RBC AUTO-ENTMCNC: 36.4 G/DL (ref 31.5–35.7)
MCV RBC AUTO: 78 FL (ref 79–97)
MONOCYTES # BLD AUTO: 0.7 X10E3/UL (ref 0.1–0.9)
MONOCYTES NFR BLD AUTO: 14 %
NEUTROPHILS # BLD AUTO: 2.6 X10E3/UL (ref 1.4–7)
NEUTROPHILS NFR BLD AUTO: 49 %
PLATELET # BLD AUTO: 257 X10E3/UL (ref 150–450)
POTASSIUM SERPL-SCNC: 4.2 MMOL/L (ref 3.5–5.2)
PROT SERPL-MCNC: 6.4 G/DL (ref 6–8.5)
PROTHROMBIN TIME: 12.7 SEC (ref 9.1–12)
RBC # BLD AUTO: 4.91 X10E6/UL (ref 3.77–5.28)
SODIUM SERPL-SCNC: 130 MMOL/L (ref 134–144)
WBC # BLD AUTO: 5.3 X10E3/UL (ref 3.4–10.8)

## 2020-10-30 RX ORDER — PREDNISONE 20 MG/1
60 TABLET ORAL
Qty: 90 TAB | Refills: 3 | Status: SHIPPED | OUTPATIENT
Start: 2020-10-30 | End: 2020-11-12

## 2020-11-04 NOTE — PROGRESS NOTES
[facility] [place] [facility] [place]  [address] [facility], [address]  João Life: [phone]  F: [phone]      Reason for Visit:   Suha Kaba is a 72 y.o. female who is seen in consultation at the request of Dr. Karin Jaimes  for evaluation of suspect hemachromatosis/ ? Need for phlebotomy. History of Present Illness:   Ms. Debra Meek is a 73 y/o female with HTN who was admitted with transaminitis, jaundice, found to have elevated iron levels. She presented with decreased appetite, dark-colored urine, and increased reflux x 10 days. Was seen by PCP; labs were notable for markedly elevated LFTs, and she was directed to the ED. ED labs notable for ALT 3,309, AST > 2,000, Lipase 547, T-bili 7.6. CT abdomen/pelvis without contrast showed periportal edema vs intrahepatic biliary ductal dilation; no acute process. Abdomen MRI showed no pancreatic or biliary ductal dilation; small indeterminate hepatic lesion. Patient was evaluated by Dr. Pierre Leonard in GI and advised to undergo liver biopsy, which was done on 10/22 by IR. Pathology is pending. Hematology consult placed given labs result on 10/22 showing markedly elevated ferritin level 6,200 and high iron stores. We were consulted for concern for hemochromatosis and possible need for phlebotomy. Today, Ms. Debra Meek reports feeling better compared to date of admission, urine has gotten lighter. Reflux has improved slightly but she has been eating light meals. States she was tested on Tues for COVID with negative results. Only new med was Prilosec added on Oct 5th for reflux; she took it for 1 week and then stopped b/c she does not like taking medications and wanted to try altering her diet to see if that would help her reflux. Also stopped taking OTC vitamins. Had not noticed that she had a jaundiced appearance. She reports following regularly with a PCP, annual labs with no prior mention of transaminitis. No significant or daily Tylenol use.  No EtOH abuse. No recent viral illness. Pt provided labs from PCP office from 10/2019:   ALT 20 AST 25 Tbili 0.7  Unaware if Fe studies had ever been done. Interval History:   Since hospital discharge, pt was told she has autoimmune hepatitis. She has seen Dr. Geoffrey Gardiner in Hepatology and has been started on Prednisone 60mg/daily and has been on this for 1 week. Her bilirubin has been declining per patient. She is feeling much better. Retired; volunteers    at bedside. Past Medical History:   Diagnosis Date    Bilateral dry eyes     Herpes simplex     Includes herpes labialis    History of mammogram 10/13/2016; 11/13/18; 01/03/2020    negative; Negative; negative birads 1 dense    Hx of mammogram 09/30/14; 10/1/15    normal; negative    Hypertension     Pap smear for cervical cancer screening 9/14/10; 9/18/12; 10/1/15; 11/13/18    Negative, HPV negative; Negative; Negative, HPV negative; Neg/Neg HPV      Past Surgical History:   Procedure Laterality Date    COLONOSCOPY N/A 12/5/2018    COLONOSCOPY performed by Suzie Jimenez MD at [address] [facility] HX COLONOSCOPY  2006    Wnl    HX GYN  1982    tubal ligation    LAP,TUBAL CAUTERY        Social History     Tobacco Use    Smoking status: Never Smoker    Smokeless tobacco: Never Used   Substance Use Topics    Alcohol use: Yes     Alcohol/week: 5.0 standard drinks     Types: 5 Glasses of wine per week     Comment: occasional wine      Family History   Problem Relation Age of Onset    Hypertension Mother     Arthritis-osteo Mother     Osteoporosis Mother     Cancer Mother 80        lymphoma    Hypertension Father     Hypertension Sister     Arthritis-osteo Sister     Hypertension Brother     Cancer Maternal Uncle      Current Outpatient Medications   Medication Sig    predniSONE (DELTASONE) 20 mg tablet Take 60 mg by mouth daily (with breakfast).  Indications: Autoimmune hepatitis    losartan (COZAAR) 50 mg tablet Take 25 mg by mouth nightly. 1/2 tab = 25 mg     peg 400-propylene glycol (Systane, propylene glycol,) 0.4-0.3 % drop Administer 1 Drop to both eyes as needed. No current facility-administered medications for this visit. Allergies   Allergen Reactions    Sulfa (Sulfonamide Antibiotics) Unknown (comments)     Pt denies    Iodine Itching        Review of Systems: A complete review of systems was obtained, negative except as described above. Physical Exam:     Visit Vitals  /82 (BP 1 Location: Left arm, BP Patient Position: Sitting)   Pulse 66   Temp 96.8 °F (36 °C) (Temporal)   Ht 5' 5\" (1.651 m)   Wt 161 lb 3.2 oz (73.1 kg)   SpO2 99%   BMI 26.83 kg/m²     ECOG PS: 0  General: jaundice; No distress  Eyes: PERRLA, mildly icteric sclerae noted  HENT: Atraumatic with normal appearance of ears and nose; OP clear  Neck: Supple; no thyromegaly   Lymphatic: No cervical, supraclavicular, or axillary adenopathy  Respiratory: CTAB, normal respiratory effort  CV: Normal rate, regular rhythm, no murmurs, no peripheral edema  GI: Soft, nontender, nondistended, no masses, no hepatomegaly, no splenomegaly  MS:Digits without clubbing or cyanosis. Skin: No rashes, ecchymoses, or petechiae. Normal temperature, turgor, and texture. Neuro/Psych: Alert, oriented, appropriate affect, normal judgment/insight      Results:     Lab Results   Component Value Date/Time    WBC 5.3 10/29/2020 02:58 PM    HGB 14.0 10/29/2020 02:58 PM    HCT 38.5 10/29/2020 02:58 PM    PLATELET 387 27/93/3927 02:58 PM    MCV 78 (L) 10/29/2020 02:58 PM    ABS.  NEUTROPHILS 2.6 10/29/2020 02:58 PM     Lab Results   Component Value Date/Time    Sodium 130 (L) 10/29/2020 02:58 PM    Potassium 4.2 10/29/2020 02:58 PM    Chloride 92 (L) 10/29/2020 02:58 PM    CO2 24 10/29/2020 02:58 PM    Glucose 89 10/29/2020 02:58 PM    BUN 5 (L) 10/29/2020 02:58 PM    Creatinine 0.79 10/29/2020 02:58 PM    GFR est AA 91 10/29/2020 02:58 PM    GFR est non-AA 79 10/29/2020 02:58 PM    Calcium 9.0 10/29/2020 02:58 PM     Lab Results   Component Value Date/Time    Bilirubin, total 7.3 (H) 11/04/2020 08:17 AM    ALT (SGPT) 825 (HH) 11/04/2020 08:17 AM    Alk. phosphatase 136 (H) 11/04/2020 08:17 AM    Protein, total 6.0 11/04/2020 08:17 AM    Albumin 3.6 (L) 11/04/2020 08:17 AM    Globulin 3.1 10/23/2020 02:56 AM     Lab Results   Component Value Date/Time    Iron % saturation 93 (H) 10/22/2020 05:24 AM    TIBC 274 10/22/2020 05:24 AM    Ferritin 5,297 (H) 10/23/2020 02:56 AM    ERIC, Direct Negative 10/22/2020 05:24 AM    Lipase 547 (H) 10/20/2020 09:18 PM    Hep C virus Ab Interp. NONREACTIVE 10/20/2020 11:13 PM     Lab Results   Component Value Date/Time    INR 1.2 10/29/2020 02:58 PM     Lab Results   Component Value Date/Time    CEA 0.4 10/20/2020 11:13 PM    Carbohydrate Antigen 19-9, (CA 19-9) 91 (H) 10/20/2020 11:13 PM    AFP, Serum, Tumor Marker 7.5 10/20/2020 11:13 PM       10/20/2020 CT ABD PELV WO CONT  IMPRESSION:  1. Periportal edema versus intrahepatic biliary ductal dilation, not well  evaluated on this noncontrast examination. Consider either contrast-enhanced CT  or MRI/MRCP for further evaluation. 2. Otherwise no evidence of acute process in the abdomen or pelvis.     10/21/2020 MRI ABD W MRCP W WO CONT  IMPRESSION:   1. No pancreatic or biliary ductal dilation. 2. No overt pancreatic or ampullary mass. 3. Small indeterminate hepatic lesion is of questionable significance. A  sclerosing hemangioma is favored. 10/22/2020 Liver biopsy:  Chronic hepatitis (grade 3/4, stage 2-3/4, Elvin-Constantino)   Comment   H&E sections show expansion of portal tracts with partial bridging fibrosis, seen with a trichrome stain, and mixed inflammation including lymphocytes, neutrophils with conspicuous plasma cells and eosinophils.  The infiltrate is also present throughout the lobule and there is focal jeana-sinusoidal fibrosis as well as focal bile duct proliferation and some bile duct injury. A reticulin stain shows periportal and focal lobular collapse and a Prussian blue stain shows mild, focal iron deposition. These findings are not entirely diagnostic and require clinical and serological correlation. The presence of plasma cells and eosinophils and the pattern of inflammation would suggest autoimmune hepatitis or possibly drug reaction or primary biliary cirrhosis. Other etiologies including viral infections are not excluded. Assessment and Recommendations:   71 yo female with PMHx of HTN admitted with transaminitis/hyperbilirubinemia, elevated iron stores and ferritin. 1. Elevated ferritin / iron stores:  HFE testing negative for any of the hemochromatosis gene mutations. The abnormal iron stores and elevated ferritin are likely related to chronic hepatitis seen on liver biopsy. These should improve over time. -- Do not recommend phlebetomy. No hematology follow up needed at this time. -- Follow up with GI and Hepatology    2. Autoimmune hepatitis:   Recently diagnosed based on liver biopsy and clinical presentation with acute hepatitis, hyperbilirubinemia. Saw Dr. Walter Alpers in Hepatology. Anti-smooth muscle Ab elevated. Was started on Prednisone 60mg/daily with plan for future taper. Pt states she is also planned to start Imuran in near future. 3. Decrease in LVEF:   TTE : LVEF 45-50%; normal wall motion. Cardiology evaluated in hospital and will follow up outpatient as well. 4. HTN:   Moderately well controlled on current regimen.      Signed By: Stew Patel MD

## 2020-11-05 ENCOUNTER — OFFICE VISIT (OUTPATIENT)
Dept: HEMATOLOGY | Age: 65
End: 2020-11-05
Payer: MEDICARE

## 2020-11-05 VITALS
BODY MASS INDEX: 26.69 KG/M2 | RESPIRATION RATE: 16 BRPM | TEMPERATURE: 97.4 F | DIASTOLIC BLOOD PRESSURE: 75 MMHG | HEART RATE: 64 BPM | HEIGHT: 65 IN | WEIGHT: 160.2 LBS | OXYGEN SATURATION: 99 % | SYSTOLIC BLOOD PRESSURE: 147 MMHG

## 2020-11-05 DIAGNOSIS — K75.4 AUTOIMMUNE HEPATITIS (HCC): Primary | ICD-10-CM

## 2020-11-05 LAB
ALBUMIN SERPL-MCNC: 3.6 G/DL (ref 3.8–4.8)
ALBUMIN/GLOB SERPL: 1.5 {RATIO} (ref 1.2–2.2)
ALP SERPL-CCNC: 136 IU/L (ref 39–117)
ALT SERPL-CCNC: 825 IU/L (ref 0–32)
AST SERPL-CCNC: 461 IU/L (ref 0–40)
BILIRUB SERPL-MCNC: 7.3 MG/DL (ref 0–1.2)
BUN SERPL-MCNC: 9 MG/DL (ref 8–27)
BUN/CREAT SERPL: 11 (ref 12–28)
CALCIUM SERPL-MCNC: 8.8 MG/DL (ref 8.7–10.3)
CHLORIDE SERPL-SCNC: 94 MMOL/L (ref 96–106)
CO2 SERPL-SCNC: 25 MMOL/L (ref 20–29)
CREAT SERPL-MCNC: 0.83 MG/DL (ref 0.57–1)
GLOBULIN SER CALC-MCNC: 2.4 G/DL (ref 1.5–4.5)
GLUCOSE SERPL-MCNC: 72 MG/DL (ref 65–99)
IGG SERPL-MCNC: 1160 MG/DL (ref 586–1602)
INR PPP: 1.1 (ref 0.9–1.2)
POTASSIUM SERPL-SCNC: 4.4 MMOL/L (ref 3.5–5.2)
PROT SERPL-MCNC: 6 G/DL (ref 6–8.5)
PROTHROMBIN TIME: 11.4 SEC (ref 9.1–12)
SODIUM SERPL-SCNC: 131 MMOL/L (ref 134–144)

## 2020-11-05 PROCEDURE — G0463 HOSPITAL OUTPT CLINIC VISIT: HCPCS | Performed by: HOSPITALIST

## 2020-11-05 PROCEDURE — 99215 OFFICE O/P EST HI 40 MIN: CPT | Performed by: HOSPITALIST

## 2020-11-05 NOTE — PATIENT INSTRUCTIONS
You likely have autoimmune hepatitis and your liver enzymes are trending down You should continue prednisone 60 mg daily for another week then taper down to 40 mg daily for 2 weeks We will start you on Azathioprine ( imuran) 50 mg daily next week We will repeat blood work next week Wednesday and I will see you next week Thursday

## 2020-11-05 NOTE — PROGRESS NOTES
[address] [facility]      Daniel Blackwood MD, Tiffanie Pérez, Aki Catherine MD, MPH      Chela Wyatt, PA-DOROTHY Merlos, [facility]     April S Humble, [facility]   Miriam Mensah, P-C    Kirtluis Guerra, [facility]       [facility] [address]    at [place] [address], [address] Yessi Cannon  22.    [phone]    FAX: [address] [place] [address] [address], [address], [address]    [phone]    FAX: [phone]       Patient Care Team:  Ishan Dennis MD as PCP - General (Internal Medicine)  Abdias Mcguire MD as Physician (Obstetrics & Gynecology)  Madelyn Vaughn MD (Breast Surgery)      Problem List  Date Reviewed: 10/29/2020          Codes Class Noted    Abnormal liver enzymes ICD-10-CM: R74.8  ICD-9-CM: 790.5  10/29/2020        Hypertension ICD-10-CM: I10  ICD-9-CM: 401.9  Unknown        Fatigue ICD-10-CM: R53.83  ICD-9-CM: 780.79  10/20/2020        Nausea ICD-10-CM: R11.0  ICD-9-CM: 787.02  10/20/2020              Rober Becerra returns to the [facility] for management of Autoimmune Hepatitis  The active problem list, all pertinent past medical history, medications, radiologic findings, liver biopsy and laboratory findings related to the liver disorder were reviewed with the patient. The patient is a 72 y.o. Black female who was found to have elevated liver enzymes in 10/2020 during recent hospital admission for severe fatigue. Liver enzymes performed during admission on 10/23 showed AST 1,761, ALT 2669, , TP 5.8, Alb 2.7    Serologic evaluation for markers of chronic liver disease was positive for ASMA 25, ferritin 6,200, iron saturation 93%. Acute hepatitis panel was negative. AMA < 20, ceruloplasmin 30.2,  CA 19-9 - 91. Imaging of the liver with CT scan without contrast performed 10/20 showed periportal edema vs intrahepatic biliary ductal dilation. Subsequent MRI/MRCP of the liver with and without contrast performed in 10/20 demonstrated no pancreatic or biliary dilation. No overt pancreatic or ampullary mass. Small indeterminate hepatic lesion. A liver biopsy was performed on 10/22. This demonstrated chronic hepatitis (grade 3/4, stage 2-3/4, Elvin-Constantino). Mixed inflammatory cells including lymphocytes, neutrophils with conspicuous plasma cells and eosinophils. Liver biopsy slides were obtain and read here at the liver institute and it was consistent with autoimmune hepatitis. Patient reports she was recently started on prilosec. She is on tumeric 1000 mg, apple cidar vinegar tablet, biotin 100 mcg , Vitamin C 250 mg daily, allegra 180 mg daily. She was recently treated with ciprofloxacin in 08/2020 for gastroenteritis. Patient current reports fatigue, jaundice, dark urine, pale stool and poor appetite. She also notes wide spread joint pains    she denies abdominal pain over the liver, ascites, lower extremity swelling, confusion or problems with concentration. The patient completes all daily activities without any functional limitations. Interval History    Patient was seen initially 10/30. Liver biopsy slides was obtained and reviewed with MLS  Patient was started on prednisone 60 mg daily a week ago  LFT's performed in 11/4 demonstrates improving liver enzymes. , ,  , TB 7.2, INR 1.1    She feels over all better. Jaundice is clearing and appetite is improving. She denies abdominal pain.        Health maintenance  Variceal surveillance None  CRC screening:  [facility] [address] surveillance: MRI 10/20: Small indeterminate hepatic lesion  Hepatitis A serology:   Hepatitis B serology sent  Influenza: Recommended this occur late into each fall  Pneumonia: Prevnar followed by Pneumovax after 8 weeks      ASSESSMENT AND PLAN:    Autoimmune hepatitis:   The diagnosis is based on liver biopsy, laboratory studies and autoimmune serologic markers  ASMA is positive  She is on prednisone 60 mg daily with improving liver enzymes   Prednisone 60 mg daily will be continued for 2 weeks duration and then will be tapered down to 40 mg daily for another 2 weeks and then 30 mg daily for 2 weeks and then 20 mg daily until resolution of abnormal liver enzymes  We will start Azathioprine ( imuran)  50mg daily starting next week. We will continue to monitor liver enzymes    Elevated liver enzymes  This is secondary to autoimmune hepatitis  Management as above      Screening for Hepatocellular Carcinoma  HCC screening is not necessary if the patient has no evidence of cirrhosis. Imaging shows indeterminate hepatic lesions. AFP was negative. CA 19-9 was elevated. Treatment of other medical problems in patients with chronic liver disease  There are no contraindications for the patient to take most medications that are necessary for treatment of other medical issues. Patient should stop all over the counter medications for now    Counseling for alcohol in patients with chronic liver disease  The patient does not consume any significant amount of alcohol. Vaccinations   Vaccination for hepatitis A and B virus is recommended as patient does not have any serologic evidence of immunity. Routine vaccinations against other bacterial and viral agents can be performed as indicated. Annual flu vaccination should be administered if indicated. Elevation in Ferritin  There is an elevation in ferritin with Elevated iron saturation. HFE genetic testing was negative for Hereditary hemochromatosis   The elevation in ferritin reflects hepatic inflammation.       Hepatic lesion:   Possibly due to sclerosing hemangioma  This will be monitored    ALLERGIES  Allergies   Allergen Reactions    Sulfa (Sulfonamide Antibiotics) Unknown (comments)     Pt denies    Iodine Itching       MEDICATIONS  Current Outpatient Medications   Medication Sig    predniSONE (DELTASONE) 20 mg tablet Take 60 mg by mouth daily (with breakfast). Indications: Autoimmune hepatitis    losartan (COZAAR) 50 mg tablet Take 25 mg by mouth nightly. 1/2 tab = 25 mg     peg 400-propylene glycol (Systane, propylene glycol,) 0.4-0.3 % drop Administer 1 Drop to both eyes as needed. No current facility-administered medications for this visit. SYSTEM REVIEW NOT RELATED TO LIVER DISEASE OR REVIEWED ABOVE:  Constitution systems: Negative for fever, chills, weight gain, weight loss. Eyes: Negative for visual changes. ENT: Negative for sore throat, painful swallowing. Respiratory: Negative for cough, hemoptysis, SOB. Cardiology: Negative for chest pain, palpitations. GI:  Negative for constipation or diarrhea. : Negative for urinary frequency, dysuria, hematuria, nocturia. Skin: Negative for rash. Hematology: Negative for easy bruising, blood clots. Musculo-skelatal: Positive for weakness and joint pains  Neurologic: Negative for headaches, dizziness, vertigo, memory problems not related to HE. Psychology: Negative for anxiety, depression. FAMILY HISTORY:  The father is .  of MI  The mother is .  of recurrent stroke  There is no family history of liver disease. The patient sister has SLE      SOCIAL HISTORY:  The patient is . The patient has 3 children and 9 grand children  The patient has never used tobacco products. The patient consumes alcohol on social occasions never in excess. The patient is retired.  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 Eyes: Sclera icteric. ENT: No oral lesions. Thyroid normal.  Nodes: No adenopathy. Skin: No spider angiomata. + jaundice. No palmar erythema. Respiratory: Lungs clear to auscultation. Cardiovascular: Regular heart rate. No murmurs. No JVD. Abdomen: Soft non-tender. Liver size normal to percussion/palpation. Spleen not palpable. No obvious ascites. Extremities: No edema. No muscle wasting. No gross arthritic changes. Neurologic: Alert and oriented. Cranial nerves grossly intact. No asterixis. LABORATORY STUDIES:  Recent liver function panel, CBC with platelet count and BMP are not available. These studies will be performed. Liver [address] Units 11/4/2020 10/29/2020   WBC 3.4 - 10.8 x10E3/uL  5.3   ANC 1.4 - 7.0 x10E3/uL  2.6   HGB 11.1 - 15.9 g/dL  14.0    - 450 x10E3/uL  257   INR 0.9 - 1.2 1.1 1.2   AST 0 - 40 IU/L 461 (H) 2,303 (HH)   ALT 0 - 32 IU/L 825 (HH) 2,334 (HH)   Alk Phos 39 - 117 IU/L 136 (H) 195 (H)   Bili, Total 0.0 - 1.2 mg/dL 7.3 (H) 23.4 (>)   Bili, Direct 0.0 - 0.2 MG/DL     Albumin 3.8 - 4.8 g/dL 3.6 (L) 3.7 (L)   BUN 8 - 27 mg/dL 9 5 (L)   Creat 0.57 - 1.00 mg/dL 0.83 0.79   Na 134 - 144 mmol/L 131 (L) 130 (L)   K 3.5 - 5.2 mmol/L 4.4 4.2   Cl 96 - 106 mmol/L 94 (L) 92 (L)   CO2 20 - 29 mmol/L 25 24   Glucose 65 - 99 mg/dL 72 89   Magnesium 1.6 - 2.4 mg/dL       SEROLOGIES:  Not available or performed. Testing was performed today.   Liver Functions & Hep Serologies Latest Ref Rng & Units 10/23/2020    Hep A, IgM NR       Hep B S Ag Index     Hep B C Ab IgM NR       Serologies Latest Ref Rng & Units 10/23/2020    Hep B Surface Ag Index     Hep B Surface Ag Interp NEG       Hep C Ab NR       Ferritin 26 - 388 NG/ML 5,297 (H)    Iron % Saturation 20 - 50 %     ASMCA 0 - 19 Units     M2 Ab 0.0 - 20.0 Units     Ceruloplasmin 19.0 - 39.0 mg/dL       Liver Functions & Hep Serologies Latest Ref Rng & Units 10/22/2020 10/21/2020   Hep A, IgM NR       Hep B S Ag Index     Hep B C Ab IgM NR       Serologies Latest Ref Rng & Units 10/22/2020    Hep B Surface Ag Index     Hep B Surface Ag Interp NEG       Hep C Ab NR       Ferritin 26 - 388 NG/ML 6,200 (H)    Iron % Saturation 20 - 50 % 93 (H)    ASMCA 0 - 19 Units 25 (H)    M2 Ab 0.0 - 20.0 Units <20.0    Ceruloplasmin 19.0 - 39.0 mg/dL 30.2      Liver Functions & Hep Serologies Latest Ref Rng & Units 10/20/2020   Hep A, IgM NR   NONREACTIVE   Hep B S Ag Index <0.10   Hep B C Ab IgM NR   NONREACTIVE   Serologies Latest Ref Rng & Units 10/20/2020   Hep B Surface Ag Index <0.10   Hep B Surface Ag Interp NEG   Negative   Hep C Ab NR   NONREACTIVE   Ferritin 26 - 388 NG/ML    Iron % Saturation 20 - 50 %    ASMCA 0 - 19 Units    M2 Ab 0.0 - 20.0 Units    Ceruloplasmin 19.0 - 39.0 mg/dL      Immunoglobulin: 1,160    LIVER HISTOLOGY:  10/22: Liver bx:  Chronic hepatitis (grade 3/4, stage 2-3/4, Elvin-Constantino)     ENDOSCOPIC PROCEDURES:  Not available or performed    RADIOLOGY:  10/20: MRI/MRCP liver: . Small indeterminate hepatic lesion is of questionable significance. A  sclerosing hemangioma is favored. 10/20: CT scan Liver: Periportal edema vs intrahepatic biliary ductal dilation    OTHER TESTING:  Not available or performed    FOLLOW-UP:  All of the issues listed above in the Assessment and Plan were discussed with the patient. All questions were answered. The patient expressed a clear understanding of the above. [address] [place] [address] in 1 week for routine monitoring to review all data and determine the treatment plan.     Lane Odell MD, MPH  Advanced Hepatology  [facility] of [address] [address] [address], [address] [place] [address].  [phone]  [address] [facility]

## 2020-11-05 NOTE — PROGRESS NOTES
Identified pt with two pt identifiers(name and ). Reviewed record in preparation for visit and have obtained necessary documentation. Chief Complaint   Patient presents with    Elevated Liver Enzymes      Vitals:    20 1049   BP: (!) 147/75   Pulse: 64   Resp: 16   Temp: 97.4 °F (36.3 °C)   TempSrc: Temporal   SpO2: 99%   Weight: 160 lb 3.2 oz (72.7 kg)   Height: 5' 5\" (1.651 m)   PainSc:   0 - No pain       Health Maintenance Review: Patient reminded of \"due or due soon\" health maintenance. I have asked the patient to contact his/her primary care provider (PCP) for follow-up on his/her health maintenance. Coordination of Care Questionnaire:  :   1) Have you been to an emergency room, urgent care, or hospitalized since your last visit? If yes, where when, and reason for visit? no       2. Have seen or consulted any other health care provider since your last visit? If yes, where when, and reason for visit? NO      Patient is accompanied by  I have received verbal consent from Chilo Ansari to discuss any/all medical information while they are present in the room.

## 2020-11-06 ENCOUNTER — OFFICE VISIT (OUTPATIENT)
Dept: ONCOLOGY | Age: 65
End: 2020-11-06
Payer: MEDICARE

## 2020-11-06 VITALS
TEMPERATURE: 96.8 F | HEART RATE: 66 BPM | WEIGHT: 161.2 LBS | BODY MASS INDEX: 26.86 KG/M2 | HEIGHT: 65 IN | DIASTOLIC BLOOD PRESSURE: 82 MMHG | OXYGEN SATURATION: 99 % | SYSTOLIC BLOOD PRESSURE: 134 MMHG

## 2020-11-06 DIAGNOSIS — K75.4 AUTOIMMUNE HEPATITIS (HCC): ICD-10-CM

## 2020-11-06 DIAGNOSIS — I10 ESSENTIAL HYPERTENSION: ICD-10-CM

## 2020-11-06 DIAGNOSIS — E80.6 HYPERBILIRUBINEMIA: ICD-10-CM

## 2020-11-06 DIAGNOSIS — R79.89 ELEVATED FERRITIN: Primary | ICD-10-CM

## 2020-11-06 PROCEDURE — 1111F DSCHRG MED/CURRENT MED MERGE: CPT | Performed by: INTERNAL MEDICINE

## 2020-11-06 PROCEDURE — 1090F PRES/ABSN URINE INCON ASSESS: CPT | Performed by: INTERNAL MEDICINE

## 2020-11-06 PROCEDURE — 99214 OFFICE O/P EST MOD 30 MIN: CPT | Performed by: INTERNAL MEDICINE

## 2020-11-06 PROCEDURE — G8536 NO DOC ELDER MAL SCRN: HCPCS | Performed by: INTERNAL MEDICINE

## 2020-11-06 PROCEDURE — G0463 HOSPITAL OUTPT CLINIC VISIT: HCPCS | Performed by: INTERNAL MEDICINE

## 2020-11-06 PROCEDURE — G9899 SCRN MAM PERF RSLTS DOC: HCPCS | Performed by: INTERNAL MEDICINE

## 2020-11-06 PROCEDURE — G8432 DEP SCR NOT DOC, RNG: HCPCS | Performed by: INTERNAL MEDICINE

## 2020-11-06 PROCEDURE — G8427 DOCREV CUR MEDS BY ELIG CLIN: HCPCS | Performed by: INTERNAL MEDICINE

## 2020-11-06 PROCEDURE — G8419 CALC BMI OUT NRM PARAM NOF/U: HCPCS | Performed by: INTERNAL MEDICINE

## 2020-11-06 PROCEDURE — 1101F PT FALLS ASSESS-DOCD LE1/YR: CPT | Performed by: INTERNAL MEDICINE

## 2020-11-06 PROCEDURE — G8400 PT W/DXA NO RESULTS DOC: HCPCS | Performed by: INTERNAL MEDICINE

## 2020-11-06 PROCEDURE — 3017F COLORECTAL CA SCREEN DOC REV: CPT | Performed by: INTERNAL MEDICINE

## 2020-11-06 NOTE — PROGRESS NOTES
Suha Kaba is a 72 y.o. female here for evaluation of elevated ferritin. Patient with no complaints of pain at this time.

## 2020-11-12 ENCOUNTER — OFFICE VISIT (OUTPATIENT)
Dept: HEMATOLOGY | Age: 65
End: 2020-11-12
Payer: MEDICARE

## 2020-11-12 VITALS
BODY MASS INDEX: 26.23 KG/M2 | DIASTOLIC BLOOD PRESSURE: 80 MMHG | RESPIRATION RATE: 14 BRPM | TEMPERATURE: 97.2 F | HEIGHT: 65 IN | OXYGEN SATURATION: 96 % | HEART RATE: 71 BPM | WEIGHT: 157.4 LBS | SYSTOLIC BLOOD PRESSURE: 138 MMHG

## 2020-11-12 DIAGNOSIS — K75.4 AUTOIMMUNE HEPATITIS (HCC): Primary | ICD-10-CM

## 2020-11-12 LAB
ALBUMIN SERPL-MCNC: 4 G/DL (ref 3.8–4.8)
ALBUMIN/GLOB SERPL: 1.9 {RATIO} (ref 1.2–2.2)
ALP SERPL-CCNC: 98 IU/L (ref 39–117)
ALT SERPL-CCNC: 279 IU/L (ref 0–32)
AST SERPL-CCNC: 74 IU/L (ref 0–40)
BILIRUB SERPL-MCNC: 4.5 MG/DL (ref 0–1.2)
BUN SERPL-MCNC: 15 MG/DL (ref 8–27)
BUN/CREAT SERPL: 16 (ref 12–28)
CALCIUM SERPL-MCNC: 9.1 MG/DL (ref 8.7–10.3)
CHLORIDE SERPL-SCNC: 90 MMOL/L (ref 96–106)
CO2 SERPL-SCNC: 28 MMOL/L (ref 20–29)
CREAT SERPL-MCNC: 0.95 MG/DL (ref 0.57–1)
GLOBULIN SER CALC-MCNC: 2.1 G/DL (ref 1.5–4.5)
GLUCOSE SERPL-MCNC: 75 MG/DL (ref 65–99)
POTASSIUM SERPL-SCNC: 4.2 MMOL/L (ref 3.5–5.2)
PROT SERPL-MCNC: 6.1 G/DL (ref 6–8.5)
SODIUM SERPL-SCNC: 131 MMOL/L (ref 134–144)

## 2020-11-12 PROCEDURE — G8432 DEP SCR NOT DOC, RNG: HCPCS | Performed by: HOSPITALIST

## 2020-11-12 PROCEDURE — 1111F DSCHRG MED/CURRENT MED MERGE: CPT | Performed by: HOSPITALIST

## 2020-11-12 PROCEDURE — 3017F COLORECTAL CA SCREEN DOC REV: CPT | Performed by: HOSPITALIST

## 2020-11-12 PROCEDURE — G9899 SCRN MAM PERF RSLTS DOC: HCPCS | Performed by: HOSPITALIST

## 2020-11-12 PROCEDURE — 1090F PRES/ABSN URINE INCON ASSESS: CPT | Performed by: HOSPITALIST

## 2020-11-12 PROCEDURE — G8400 PT W/DXA NO RESULTS DOC: HCPCS | Performed by: HOSPITALIST

## 2020-11-12 PROCEDURE — 1101F PT FALLS ASSESS-DOCD LE1/YR: CPT | Performed by: HOSPITALIST

## 2020-11-12 PROCEDURE — G0463 HOSPITAL OUTPT CLINIC VISIT: HCPCS | Performed by: HOSPITALIST

## 2020-11-12 PROCEDURE — G8427 DOCREV CUR MEDS BY ELIG CLIN: HCPCS | Performed by: HOSPITALIST

## 2020-11-12 PROCEDURE — G8536 NO DOC ELDER MAL SCRN: HCPCS | Performed by: HOSPITALIST

## 2020-11-12 PROCEDURE — 99215 OFFICE O/P EST HI 40 MIN: CPT | Performed by: HOSPITALIST

## 2020-11-12 PROCEDURE — G8419 CALC BMI OUT NRM PARAM NOF/U: HCPCS | Performed by: HOSPITALIST

## 2020-11-12 RX ORDER — PREDNISONE 10 MG/1
40 TABLET ORAL
Qty: 30 TAB | Refills: 1 | Status: SHIPPED | OUTPATIENT
Start: 2020-11-12 | End: 2021-01-12

## 2020-11-12 RX ORDER — AZATHIOPRINE 50 MG/1
50 TABLET ORAL DAILY
Qty: 90 TAB | Refills: 3 | Status: SHIPPED | OUTPATIENT
Start: 2020-11-12 | End: 2020-12-10

## 2020-11-12 NOTE — PROGRESS NOTES
[address] [facility]      Maria R Rodriguez MD, Jelani Nolasco, Jason Rowland MD, MPH      Matti Medeiros, SOLE Baron, [facility]     Anisa Kate, [facility]   Aleksandra Ba P-DOROTHY Brush, [facility]       Charles Valdez [facility] [address]    at [address], [facility] Yessi Cannon  22.    [phone]    FAX: [address] [place] [address], [address]    [phone]    FAX: [phone]     Patient Care Team:  Tyler Azevedo MD as PCP - General (Internal Medicine)  Tomeka Warren MD as Physician (Obstetrics & Gynecology)  Roscoe Garcia MD (Breast Surgery)  Katelynn Huang MD (Hematology and Oncology)  Suni Boles MD (Hepatology)    Problem List  Date Reviewed: 11/6/2020          Codes Class Noted    Abnormal liver enzymes ICD-10-CM: R74.8  ICD-9-CM: 790.5  10/29/2020        Hypertension ICD-10-CM: I10  ICD-9-CM: 401.9  Unknown        Fatigue ICD-10-CM: R53.83  ICD-9-CM: 780.79  10/20/2020        Nausea ICD-10-CM: R11.0  ICD-9-CM: 787.02  10/20/2020              Dejah Ernandez [facility] for management of Autoimmune Hepatitis  The active problem list, all pertinent past medical history, medications, radiologic findings, liver biopsy and laboratory findings related to the liver disorder were reviewed with the patient. The patient is a 72 y.o. Black female who was found to have elevated liver enzymes in 10/2020 during recent hospital admission for severe fatigue. Liver enzymes performed during admission on 10/23 showed AST 1,761, ALT 2669, , TP 5.8, Alb 2.7. Serologic evaluation for markers of chronic liver disease was positive for ASMA 25, ferritin 6,200, iron saturation 93%. Acute hepatitis panel was negative. AMA < 20, ceruloplasmin 30.2,  CA 19-9 - 91. Imaging of the liver with CT scan without contrast performed 10/20 showed periportal edema vs intrahepatic biliary ductal dilation. MRI/MRCP of the liver with and without contrast performed 10/20 demonstrated no pancreatic or biliary dilation. No overt pancreatic or ampullary mass. Small indeterminate hepatic lesion. A liver biopsy was performed on 10/22. This demonstrated chronic hepatitis (grade 3/4, stage 2-3/4, Elvin-Constantino). Mixed inflammatory cells including lymphocytes, neutrophils with conspicuous plasma cells and eosinophils. Patient reports she was recently started on prilosec. She reports she is on women vitamins, tumeric 1000 mg, apple cidar vinegar tablet, biotin 100 mcg , Vitamin C 250 mg daily, allegra 180 mg daily. She was treated with ciprofloxacin in 08/2020 for gastroenteritis. Patient current reports fatigue, yellowness of the eyes and skin, dark urine, pale stool and poor appetite. She also notes joint pains which are widespread but improving. Patient denies abdominal pain over the liver, ascites, lower extremity swelling, confusion or problems with concentration. The patient completes all daily activities without any functional limitations. Interval History  Patient was seen initially 10/30. Liver biopsy slides was obtained and reviewed with MLS. Liver biopsy is consistent with autoimmune hepatitis  Patient is currently on prednisone 60 mg daily    Patient reports feeling much better. She is complaining of poor sleep  There is no abdominal pain no fatty liver, jaundice is resolving.   There is no itching      Health maintenance  Variceal surveillance None  CRC screening:  [facility] [address] surveillance: MRI 10/20: Small indeterminate hepatic lesion  Hepatitis A serology:   Hepatitis B serology sent  Influenza: Recommended this occur late into each fall  Pneumonia: Prevnar followed by Pneumovax after 8 weeks      ASSESSMENT AND PLAN:    Autoimmune hepatitis:   The diagnosis is based on liver biopsy, and serologies  Patient was started on prednisone 60 mg daily about a week ago   Liver enzymes are trending down  Continue prednisone 60 mg daily for 1 more week and taper down to 40 mg daily for 2 weeks  We will start Azathioprine  50mg daily starting next week. We will continue to monitor liver enzymes    Screening for Hepatocellular Carcinoma  HCC screening is not necessary if the patient has no evidence of cirrhosis. Imaging shows indeterminate hepatic lesions. AFP was negative. CA 19-9 was elevated at 91  We will repeat MRI/MRCP of the liver in 6 months. Treatment of other medical problems in patients with chronic liver disease  There are no contraindications for the patient to take most medications that are necessary for treatment of other medical issues. Patient should stop all over the counter medications for now    Counseling for alcohol in patients with chronic liver disease  The patient does not consume any significant amount of alcohol. Vaccinations   The need for vaccination against viral hepatitis A and B will be assessed with serologic and instituted as appropriate. Routine vaccinations against other bacterial and viral agents can be performed as indicated. Annual flu vaccination should be administered if indicated. Elevation in Ferritin  There is an elevation in ferritin with Elevated iron saturation. HFE genetic testing was negative for any known mutations    The elevation in ferritin reflects hepatic inflammation. Hepatic lesion:   Possibly due to sclerosing hemangioma  This will be monitored    ALLERGIES  Allergies   Allergen Reactions    Sulfa (Sulfonamide Antibiotics) Unknown (comments)     Pt denies    Iodine Itching       MEDICATIONS  Current Outpatient Medications   Medication Sig    azaTHIOprine (IMURAN) 50 mg tablet Take 1 Tab by mouth daily.  Indications: liver inflammation resulting from an abnormal immune response    predniSONE (DELTASONE) 10 mg tablet Take 40 mg by mouth daily (with breakfast). Indications: Autoimmune hepatitis    losartan (COZAAR) 50 mg tablet Take 25 mg by mouth nightly. 1/2 tab = 25 mg     peg 400-propylene glycol (Systane, propylene glycol,) 0.4-0.3 % drop Administer 1 Drop to both eyes as needed. No current facility-administered medications for this visit. SYSTEM REVIEW NOT RELATED TO LIVER DISEASE OR REVIEWED ABOVE:  Constitution systems: Negative for fever, chills, weight gain, weight loss. Eyes: Negative for visual changes. ENT: Negative for sore throat, painful swallowing. Respiratory: Negative for cough, hemoptysis, SOB. Cardiology: Negative for chest pain, palpitations. GI:  Negative for constipation or diarrhea. : Negative for urinary frequency, dysuria, hematuria, nocturia. Skin: Negative for rash. Hematology: Negative for easy bruising, blood clots. Musculo-skelatal: Positive for weakness and joint pains  Neurologic: Negative for headaches, dizziness, vertigo, memory problems not related to HE. Psychology: Negative for anxiety, depression. FAMILY HISTORY:  The father is .  of MI  The mother is .  of recurrent stroke  There is no family history of liver disease. The patient sister has SLE      SOCIAL HISTORY:  The patient is . The patient has 3 children and 9 grand children  The patient has never used tobacco products. The patient consumes alcohol on social occasions never in excess. The patient is retired. 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    Eyes: Sclera icteric. ENT: No oral lesions. Thyroid normal.  Nodes: No adenopathy. Skin: No spider angiomata. + jaundice. No palmar erythema. Respiratory: Lungs clear to auscultation. Cardiovascular: Regular heart rate. No murmurs. No JVD. Abdomen: Soft non-tender. Liver size normal to percussion/palpation. Spleen not palpable. No obvious ascites. Extremities: No edema. No muscle wasting. No gross arthritic changes. Neurologic: Alert and oriented. Cranial nerves grossly intact. No asterixis. LABORATORY STUDIES:  Recent liver function panel, CBC with platelet count and BMP are not available. These studies will be performed. Liver [place] of [address] [place] [address] Units 11/11/2020 11/4/2020   WBC 3.4 - 10.8 x10E3/uL     ANC 1.4 - 7.0 x10E3/uL     HGB 11.1 - 15.9 g/dL      - 450 x10E3/uL     INR 0.9 - 1.2  1.1   AST 0 - 40 IU/L 74 (H) 461 (H)   ALT 0 - 32 IU/L 279 (H) 825 (HH)   Alk Phos 39 - 117 IU/L 98 136 (H)   Bili, Total 0.0 - 1.2 mg/dL 4.5 (H) 7.3 (H)   Bili, Direct 0.0 - 0.2 MG/DL     Albumin 3.8 - 4.8 g/dL 4.0 3.6 (L)   BUN 8 - 27 mg/dL 15 9   Creat 0.57 - 1.00 mg/dL 0.95 0.83   Na 134 - 144 mmol/L 131 (L) 131 (L)   K 3.5 - 5.2 mmol/L 4.2 4.4   Cl 96 - 106 mmol/L 90 (L) 94 (L)   CO2 20 - 29 mmol/L 28 25   Glucose 65 - 99 mg/dL 75 72   Magnesium 1.6 - 2.4 mg/dL       SEROLOGIES:  Not available or performed. Testing was performed today.   Liver Functions & Hep Serologies Latest Ref Rng & Units 10/23/2020    Hep A, IgM NR       Hep B S Ag Index     Hep B C Ab IgM NR       Serologies Latest Ref Rng & Units 10/23/2020    Hep B Surface Ag Index     Hep B Surface Ag Interp NEG       Hep C Ab NR       Ferritin 26 - 388 NG/ML 5,297 (H)    Iron % Saturation 20 - 50 %     ASMCA 0 - 19 Units     M2 Ab 0.0 - 20.0 Units     Ceruloplasmin 19.0 - 39.0 mg/dL       Liver Functions & Hep Serologies Latest Ref Rng & Units 10/22/2020 10/21/2020   Hep A, IgM NR       Hep B S Ag Index     Hep B C Ab IgM NR Serologies Latest Ref Rng & Units 10/22/2020    Hep B Surface Ag Index     Hep B Surface Ag Interp NEG       Hep C Ab NR       Ferritin 26 - 388 NG/ML 6,200 (H)    Iron % Saturation 20 - 50 % 93 (H)    ASMCA 0 - 19 Units 25 (H)    M2 Ab 0.0 - 20.0 Units <20.0    Ceruloplasmin 19.0 - 39.0 mg/dL 30.2      Liver Functions & Hep Serologies Latest Ref Rng & Units 10/20/2020   Hep A, IgM NR   NONREACTIVE   Hep B S Ag Index <0.10   Hep B C Ab IgM NR   NONREACTIVE   Serologies Latest Ref Rng & Units 10/20/2020   Hep B Surface Ag Index <0.10   Hep B Surface Ag Interp NEG   Negative   Hep C Ab NR   NONREACTIVE   Ferritin 26 - 388 NG/ML    Iron % Saturation 20 - 50 %    ASMCA 0 - 19 Units    M2 Ab 0.0 - 20.0 Units    Ceruloplasmin 19.0 - 39.0 mg/dL      Immunoglobulin: 1,160    LIVER HISTOLOGY:  10/22: Liver bx:  Chronic hepatitis (grade 3/4, stage 2-3/4, Elvin-Constantino)     ENDOSCOPIC PROCEDURES:  Not available or performed    RADIOLOGY:  10/20: MRI/MRCP liver: . Small indeterminate hepatic lesion is of questionable significance. A  sclerosing hemangioma is favored. 10/20: CT scan WO Liver: Periportal edema vs intrahepatic biliary ductal dilation    OTHER TESTING:  Not available or performed    FOLLOW-UP:  All of the issues listed above in the Assessment and Plan were discussed with the patient. All questions were answered. The patient expressed a clear understanding of the above. [address] [place] [address] in 2-4 weeks for routine monitoring to review all data and determine the treatment plan.     Nelly Shah MD, MPH  Advanced Hepatology  [facility] of [address] [address] [address], [address], [facility] 22.  [phone]  [address] [facility]

## 2020-11-12 NOTE — LETTER
12/18/2020 Patient: Carmine Uribe YOB: 1955 Date of Visit: 11/12/2020 Pressley Eisenmenger, MD 
[address] [address] Via Fax: [phone] Dear Pressley Eisenmenger, MD, Thank you for referring Ms. Jairon Dolan to [address] Mead for evaluation. My notes for this consultation are attached. If you have questions, please do not hesitate to call me. I look forward to following your patient along with you. Sincerely, Nadia Cardoso MD

## 2020-11-12 NOTE — PROGRESS NOTES
Identified pt with two pt identifiers(name and ). Reviewed record in preparation for visit and have obtained necessary documentation. Chief Complaint   Patient presents with    Follow-up      Vitals:    20 1133   BP: 138/80   Pulse: 71   Resp: 14   Temp: 97.2 °F (36.2 °C)   TempSrc: Temporal   SpO2: 96%   Weight: 157 lb 6.4 oz (71.4 kg)   Height: 5' 5\" (1.651 m)   PainSc:   0 - No pain       Health Maintenance Review: Patient reminded of \"due or due soon\" health maintenance. I have asked the patient to contact his/her primary care provider (PCP) for follow-up on his/her health maintenance. Coordination of Care Questionnaire:  :   1) Have you been to an emergency room, urgent care, or hospitalized since your last visit? If yes, where when, and reason for visit? no       2. Have seen or consulted any other health care provider since your last visit? If yes, where when, and reason for visit? NO      Patient is accompanied by Her  I have received verbal consent from Chilo Ansari to discuss any/all medical information while they are present in the room.

## 2020-11-12 NOTE — PATIENT INSTRUCTIONS
We will reduce prednisone to 40 mg daily for 2 weeks and then 30 mg for another 2 weeks and then 20 mg daily. We will start you on Azathioprine 50 mg daily Return to the clinic in 4 weeks

## 2020-11-15 LAB
INTERPRETATION:, 510752: NORMAL
REF LAB TEST METHOD: NORMAL
TPMT RBC-CCNC: 18 UNITS/ML RBC

## 2020-11-27 PROBLEM — K75.4 AUTOIMMUNE HEPATITIS (HCC): Status: ACTIVE | Noted: 2020-11-27

## 2020-12-10 ENCOUNTER — OFFICE VISIT (OUTPATIENT)
Dept: HEMATOLOGY | Age: 65
End: 2020-12-10
Payer: MEDICARE

## 2020-12-10 VITALS
HEIGHT: 65 IN | DIASTOLIC BLOOD PRESSURE: 75 MMHG | WEIGHT: 158.6 LBS | TEMPERATURE: 97.8 F | HEART RATE: 71 BPM | OXYGEN SATURATION: 97 % | RESPIRATION RATE: 14 BRPM | BODY MASS INDEX: 26.42 KG/M2 | SYSTOLIC BLOOD PRESSURE: 124 MMHG

## 2020-12-10 DIAGNOSIS — K75.4 AUTOIMMUNE HEPATITIS (HCC): Primary | ICD-10-CM

## 2020-12-10 LAB
ALBUMIN SERPL-MCNC: 3.9 G/DL (ref 3.8–4.8)
ALBUMIN/GLOB SERPL: 2.1 {RATIO} (ref 1.2–2.2)
ALP SERPL-CCNC: 52 IU/L (ref 39–117)
ALT SERPL-CCNC: 37 IU/L (ref 0–32)
AST SERPL-CCNC: 36 IU/L (ref 0–40)
BILIRUB SERPL-MCNC: 1.2 MG/DL (ref 0–1.2)
BUN SERPL-MCNC: 17 MG/DL (ref 8–27)
BUN/CREAT SERPL: 18 (ref 12–28)
CALCIUM SERPL-MCNC: 8.9 MG/DL (ref 8.7–10.3)
CHLORIDE SERPL-SCNC: 94 MMOL/L (ref 96–106)
CO2 SERPL-SCNC: 30 MMOL/L (ref 20–29)
CREAT SERPL-MCNC: 0.96 MG/DL (ref 0.57–1)
GLOBULIN SER CALC-MCNC: 1.9 G/DL (ref 1.5–4.5)
GLUCOSE SERPL-MCNC: 78 MG/DL (ref 65–99)
POTASSIUM SERPL-SCNC: 4 MMOL/L (ref 3.5–5.2)
PROT SERPL-MCNC: 5.8 G/DL (ref 6–8.5)
SODIUM SERPL-SCNC: 136 MMOL/L (ref 134–144)

## 2020-12-10 PROCEDURE — 99215 OFFICE O/P EST HI 40 MIN: CPT | Performed by: HOSPITALIST

## 2020-12-10 PROCEDURE — G0463 HOSPITAL OUTPT CLINIC VISIT: HCPCS | Performed by: HOSPITALIST

## 2020-12-10 RX ORDER — ONDANSETRON 4 MG/1
4 TABLET, FILM COATED ORAL
Qty: 21 TAB | Refills: 0 | Status: SHIPPED | OUTPATIENT
Start: 2020-12-10 | End: 2021-03-20

## 2020-12-10 RX ORDER — AZATHIOPRINE 50 MG/1
75 TABLET ORAL DAILY
Qty: 90 TAB | Refills: 3 | Status: SHIPPED | OUTPATIENT
Start: 2020-12-10 | End: 2020-12-11 | Stop reason: SDUPTHER

## 2020-12-10 NOTE — PROGRESS NOTES
[address] [facility] [address]      Shae Waddell MD, Norman Nassar, Salma Guerrero MD, MPH      ANKUSH Viveros-DOROTHY Mendez, ACN-BC     Anisa Kate, [facility]   Jojo Lam, P-C    Nathanael Salcido, [facility]       Charles Valdez Moises De Mccabe [address]    at [address], [address] [place] [address], Yessi  22.    [phone]    FAX: [address] [place] [address] Drive, [place] [address]., [address]    [phone]    FAX: [phone]       Patient Care Team:  Mony Pedraza MD as PCP - General (Internal Medicine)  Casey Antonio MD as Physician (Obstetrics & Gynecology)  Rossy Swenson MD (Breast Surgery)  Stanton Schwartz MD (Hematology and Oncology)  Ashley Gomez MD (Hepatology)      Problem List  Date Reviewed: 11/27/2020          Codes Class Noted    Autoimmune hepatitis [facility]) ICD-10-CM: K75.4  ICD-9-CM: 571.42  11/27/2020        Abnormal liver enzymes ICD-10-CM: R74.8  ICD-9-CM: 790.5  10/29/2020        Hypertension ICD-10-CM: I10  ICD-9-CM: 401.9  Unknown        Fatigue ICD-10-CM: R53.83  ICD-9-CM: 780.79  10/20/2020        Nausea ICD-10-CM: R11.0  ICD-9-CM: 787.02  10/20/2020              Jaye Hsieh is being seen at [address] for management of Autoimmune Hepatitis  The active problem list, all pertinent past medical history, medications, radiologic findings, liver biopsy and laboratory findings related to the liver disorder were reviewed with the patient. The patient is a 72 y.o. Black female who was found to have elevated liver enzymes in 10/2020 during recent hospital admission for severe fatigue. Liver enzymes performed during admission on 10/23 showed AST 1,761, ALT 2669, , TP 5.8, Alb 2.7.    Serologic evaluation for markers of chronic liver disease was positive for ASMA 25, ferritin 6,200, iron saturation 93%. Acute hepatitis panel was negative. AMA < 20, ceruloplasmin 30.2,  CA 19-9 - 91. Imaging of the liver with CT scan without contrast performed 10/20 showed periportal edema vs intrahepatic biliary ductal dilation. MRI/MRCP of the liver with and without contrast performed 10/20 demonstrated no pancreatic or biliary dilation. No overt pancreatic or ampullary mass. Small indeterminate hepatic lesion. A liver biopsy was performed on 10/22. This demonstrated chronic hepatitis (grade 3/4, stage 2-3/4, Elvin-Constantino). Mixed inflammatory cells including lymphocytes, neutrophils with conspicuous plasma cells and eosinophils. Patient reports she was recently started on prilosec. She reports she is on women vitamins, tumeric 1000 mg, apple cidar vinegar tablet, biotin 100 mcg , Vitamin C 250 mg daily, allegra 180 mg daily. She was treated with ciprofloxacin in 08/2020 for gastroenteritis. Patient current reports fatigue, yellowness of the eyes and skin, dark urine, pale stool and poor appetite. She also notes joint pains which are widespread but improving. Patient denies abdominal pain over the liver, ascites, lower extremity swelling, confusion or problems with concentration. The patient completes all daily activities without any functional limitations. Interval History  Patient was seen in the clinic 11/5    Repeat blood work performed yesterday showed AST 36, ALT 37, ALP 52, Total bilirubin 1.2. Patient reports that cramping has reduced. She is having problems with sleeping although better. She has been feeling nauseus and having about 3-4 soft stool a day  There is no abdominal pain over the liver. No jaundice, no ascites or lower extremity. No fever or chills.     Health maintenance  Variceal surveillance None  CRC screening:  [facility] [address] surveillance: MRI 10/20: Small indeterminate hepatic lesion  Hepatitis A serology: Sent  Hepatitis B serology: Not determined  Influenza: Received 10/2020  Pneumonia: Prevnar followed by Pneumovax after 8 weeks    ASSESSMENT AND PLAN:    Autoimmune hepatitis:   The diagnosis is based on liver biopsy, and serologies  ASMA is positive  She has stage 3 fibrosis. Patient is currently on prednisone 30 mg daily and will be tapered to 20 mg daily tomorrow. We will increase imuran to 75 mg daily   We will continue to monitor liver enzymes    Elevated liver enzymes  This is secondary to autoimmune hepatitis  Resolving    Screening for Hepatocellular Carcinoma  HCC screening is not necessary if the patient has no evidence of cirrhosis. Imaging shows indeterminate hepatic lesions suggestive of sclerosing hemangioma  AFP was negative. CA 19-9 was elevated. Treatment of other medical problems in patients with chronic liver disease  There are no contraindications for the patient to take most medications that are necessary for treatment of other medical issues. Patient should stop all over the counter medications for now    Counseling for alcohol in patients with chronic liver disease  The patient does not consume any significant amount of alcohol. Vaccinations   The need for vaccination against viral hepatitis A  will be assessed with serology and instituted as appropriate. Routine vaccinations against other bacterial and viral agents can be performed as indicated. Annual flu vaccination should be administered if indicated. Elevation in Ferritin  There is an elevation in ferritin with Elevated iron saturation. HFE genetic testing was negative for any known mutations    The elevation in ferritin reflects hepatic inflammation. Hepatic lesion:   Possibly due to sclerosing hemangioma  This is seen on imaging.  This will be monitored    ALLERGIES  Allergies   Allergen Reactions    Sulfa (Sulfonamide Antibiotics) Unknown (comments)     Pt denies    Iodine Itching MEDICATIONS  Current Outpatient Medications   Medication Sig    azaTHIOprine (IMURAN) 50 mg tablet Take 1 Tab by mouth daily. Indications: liver inflammation resulting from an abnormal immune response    predniSONE (DELTASONE) 10 mg tablet Take 40 mg by mouth daily (with breakfast). Indications: Autoimmune hepatitis    losartan (COZAAR) 50 mg tablet Take 25 mg by mouth nightly. 1/2 tab = 25 mg     peg 400-propylene glycol (Systane, propylene glycol,) 0.4-0.3 % drop Administer 1 Drop to both eyes as needed. No current facility-administered medications for this visit. SYSTEM REVIEW NOT RELATED TO LIVER DISEASE OR REVIEWED ABOVE:  Constitution systems: Negative for fever, chills, weight gain, weight loss. Eyes: Negative for visual changes. ENT: Negative for sore throat, painful swallowing. Respiratory: Negative for cough, hemoptysis, SOB. Cardiology: Negative for chest pain, palpitations. GI:  Negative for constipation or diarrhea. : Negative for urinary frequency, dysuria, hematuria, nocturia. Skin: Negative for rash. Hematology: Negative for easy bruising, blood clots. Musculo-skelatal: Positive for weakness and joint pains  Neurologic: Negative for headaches, dizziness, vertigo, memory problems not related to HE. Psychology: Negative for anxiety, depression. FAMILY HISTORY:  The father is .  of MI  The mother is .  of recurrent stroke  There is no family history of liver disease. The patient sister has SLE    SOCIAL HISTORY:  The patient is . The patient has 3 children and 9 grand children  The patient has never used tobacco products. The patient consumes alcohol on social occasions never in excess. The patient is retired.  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 Eyes: anicteric  ENT: No oral lesions. Thyroid normal.  Nodes: No adenopathy. Skin: No spider angiomata. No jaundice. No palmar erythema. Respiratory: Lungs clear to auscultation. Cardiovascular: Regular heart rate. No murmurs. No JVD. Abdomen: Soft non-tender. Liver size normal to percussion/palpation. Spleen not palpable. No obvious ascites. Extremities: No edema. No muscle wasting. No gross arthritic changes. Neurologic: Alert and oriented. Cranial nerves grossly intact. No asterixis. LABORATORY STUDIES:  Recent liver function panel, CBC with platelet count and BMP are not available. These studies will be performed. [facility] of [address] [place] [address] Units 12/9/2020 11/11/2020   WBC 3.4 - 10.8 x10E3/uL     ANC 1.4 - 7.0 x10E3/uL     HGB 11.1 - 15.9 g/dL      - 450 x10E3/uL     INR 0.9 - 1.2     AST 0 - 40 IU/L 36 74 (H)   ALT 0 - 32 IU/L 37 (H) 279 (H)   Alk Phos 39 - 117 IU/L 52 98   Bili, Total 0.0 - 1.2 mg/dL 1.2 4.5 (H)   Bili, Direct 0.0 - 0.2 MG/DL     Albumin 3.8 - 4.8 g/dL 3.9 4.0   BUN 8 - 27 mg/dL 17 15   Creat 0.57 - 1.00 mg/dL 0.96 0.95   Na 134 - 144 mmol/L 136 131 (L)   K 3.5 - 5.2 mmol/L 4.0 4.2   Cl 96 - 106 mmol/L 94 (L) 90 (L)   CO2 20 - 29 mmol/L 30 (H) 28   Glucose 65 - 99 mg/dL 78 75   Magnesium 1.6 - 2.4 mg/dL       SEROLOGIES:  Not available or performed. Testing was performed today.   Liver Functions & Hep Serologies Latest Ref Rng & Units 10/23/2020    Hep A, IgM NR       Hep B S Ag Index     Hep B C Ab IgM NR       Serologies Latest Ref Rng & Units 10/23/2020    Hep B Surface Ag Index     Hep B Surface Ag Interp NEG       Hep C Ab NR       Ferritin 26 - 388 NG/ML 5,297 (H)    Iron % Saturation 20 - 50 %     ASMCA 0 - 19 Units     M2 Ab 0.0 - 20.0 Units     Ceruloplasmin 19.0 - 39.0 mg/dL       Liver Functions & Hep Serologies Latest Ref Rng & Units 10/22/2020 10/21/2020   Hep A, IgM NR       Hep B S Ag Index     Hep B C Ab IgM NR       Serologies Latest Ref Rng & Units 10/22/2020    Hep B Surface Ag Index     Hep B Surface Ag Interp NEG       Hep C Ab NR       Ferritin 26 - 388 NG/ML 6,200 (H)    Iron % Saturation 20 - 50 % 93 (H)    ASMCA 0 - 19 Units 25 (H)    M2 Ab 0.0 - 20.0 Units <20.0    Ceruloplasmin 19.0 - 39.0 mg/dL 30.2      Liver Functions & Hep Serologies Latest Ref Rng & Units 10/20/2020   Hep A, IgM NR   NONREACTIVE   Hep B S Ag Index <0.10   Hep B C Ab IgM NR   NONREACTIVE   Serologies Latest Ref Rng & Units 10/20/2020   Hep B Surface Ag Index <0.10   Hep B Surface Ag Interp NEG   Negative   Hep C Ab NR   NONREACTIVE   Ferritin 26 - 388 NG/ML    Iron % Saturation 20 - 50 %    ASMCA 0 - 19 Units    M2 Ab 0.0 - 20.0 Units    Ceruloplasmin 19.0 - 39.0 mg/dL      Immunoglobulin: 1,160  Serologies Latest Ref Rng & Units 10/23/2020 10/22/2020   Hep B Surface Ag Index     Hep B Surface Ag Interp NEG       Hep C Ab NR       Ferritin 26 - 388 NG/ML 5,297 (H) 6,200 (H)   Iron % Saturation 20 - 50 %  93 (H)   ASMCA 0 - 19 Units  25 (H)   M2 Ab 0.0 - 20.0 Units  <20.0   Ceruloplasmin 19.0 - 39.0 mg/dL  30.2       LIVER HISTOLOGY:  10/22: Liver bx:  Chronic hepatitis (grade 3/4, stage 2-3/4, Elvin-Constantino)     ENDOSCOPIC PROCEDURES:  Not available or performed    RADIOLOGY:  10/20: MRI/MRCP liver: . Small indeterminate hepatic lesion is of questionable significance. A  sclerosing hemangioma is favored. 10/20: CT scan WO Liver: Periportal edema vs intrahepatic biliary ductal dilation    OTHER TESTING:  Not available or performed    FOLLOW-UP:  All of the issues listed above in the Assessment and Plan were discussed with the patient. All questions were answered. The patient expressed a clear understanding of the above.     [address] [place] [address] in 4 weeks for routine monitoring and to review all data and determine the treatment plan.     Nelly Shah MD, MPH  Advanced Hepatology  [facility] of [address] [address] [place], [address] [place] [address] 22.  [phone]  [address] [place] [address]

## 2020-12-10 NOTE — PATIENT INSTRUCTIONS
Start calcium and vitamin D multivitamin supplements Increase your imuran to 75 mg daily Start prednisone 20 mg daily tomorrow Return to clinic in 4 weeks

## 2020-12-10 NOTE — PROGRESS NOTES
Identified pt with two pt identifiers(name and ). Reviewed record in preparation for visit and have obtained necessary documentation. Chief Complaint   Patient presents with    Other     Autoimmune Hepatitis      Vitals:    12/10/20 1136   BP: 124/75   Pulse: 71   Resp: 14   Temp: 97.8 °F (36.6 °C)   TempSrc: Temporal   SpO2: 97%   Weight: 158 lb 9.6 oz (71.9 kg)   Height: 5' 5\" (1.651 m)   PainSc:   0 - No pain       Health Maintenance Review: Patient reminded of \"due or due soon\" health maintenance. I have asked the patient to contact his/her primary care provider (PCP) for follow-up on his/her health maintenance. Coordination of Care Questionnaire:  :   1) Have you been to an emergency room, urgent care, or hospitalized since your last visit? If yes, where when, and reason for visit? no       2. Have seen or consulted any other health care provider since your last visit? If yes, where when, and reason for visit? NO      Patient is accompanied by  I have received verbal consent from Chilo Ansari to discuss any/all medical information while they are present in the room.

## 2020-12-10 NOTE — LETTER
12/10/20 Patient: Janel Diaz YOB: 1955 Date of Visit: 12/10/2020 [address], [place] 
[address] [address] VIA Facsimile: [phone] Dear [address] Nancy Atkinson MD, Thank you for referring Ms. Jairon Dolan to [address] for evaluation. My notes for this consultation are attached. If you have questions, please do not hesitate to call me. I look forward to following your patient along with you. Sincerely, Yuko Osman MD

## 2020-12-11 RX ORDER — AZATHIOPRINE 50 MG/1
75 TABLET ORAL DAILY
Qty: 180 TAB | Refills: 3 | Status: SHIPPED | OUTPATIENT
Start: 2020-12-11 | End: 2021-03-20

## 2021-01-08 LAB
BASOPHILS # BLD AUTO: 0 X10E3/UL (ref 0–0.2)
BASOPHILS NFR BLD AUTO: 1 %
EOSINOPHIL # BLD AUTO: 0 X10E3/UL (ref 0–0.4)
EOSINOPHIL NFR BLD AUTO: 1 %
ERYTHROCYTE [DISTWIDTH] IN BLOOD BY AUTOMATED COUNT: 16.4 % (ref 11.7–15.4)
HCT VFR BLD AUTO: 40.8 % (ref 34–46.6)
HGB BLD-MCNC: 14.2 G/DL (ref 11.1–15.9)
IMM GRANULOCYTES # BLD AUTO: 0 X10E3/UL (ref 0–0.1)
IMM GRANULOCYTES NFR BLD AUTO: 1 %
LYMPHOCYTES # BLD AUTO: 1.6 X10E3/UL (ref 0.7–3.1)
LYMPHOCYTES NFR BLD AUTO: 26 %
MCH RBC QN AUTO: 31 PG (ref 26.6–33)
MCHC RBC AUTO-ENTMCNC: 34.8 G/DL (ref 31.5–35.7)
MCV RBC AUTO: 89 FL (ref 79–97)
MONOCYTES # BLD AUTO: 0.9 X10E3/UL (ref 0.1–0.9)
MONOCYTES NFR BLD AUTO: 14 %
NEUTROPHILS # BLD AUTO: 3.7 X10E3/UL (ref 1.4–7)
NEUTROPHILS NFR BLD AUTO: 57 %
PLATELET # BLD AUTO: 196 X10E3/UL (ref 150–450)
RBC # BLD AUTO: 4.58 X10E6/UL (ref 3.77–5.28)
WBC # BLD AUTO: 6.3 X10E3/UL (ref 3.4–10.8)

## 2021-01-09 LAB
ALBUMIN SERPL-MCNC: 4 G/DL (ref 3.8–4.8)
ALBUMIN/GLOB SERPL: 2.2 {RATIO} (ref 1.2–2.2)
ALP SERPL-CCNC: 37 IU/L (ref 39–117)
ALT SERPL-CCNC: 25 IU/L (ref 0–32)
AST SERPL-CCNC: 29 IU/L (ref 0–40)
BILIRUB SERPL-MCNC: 0.6 MG/DL (ref 0–1.2)
BUN SERPL-MCNC: 8 MG/DL (ref 8–27)
BUN/CREAT SERPL: 10 (ref 12–28)
CALCIUM SERPL-MCNC: 8.9 MG/DL (ref 8.7–10.3)
CHLORIDE SERPL-SCNC: 96 MMOL/L (ref 96–106)
CO2 SERPL-SCNC: 28 MMOL/L (ref 20–29)
CREAT SERPL-MCNC: 0.82 MG/DL (ref 0.57–1)
GLOBULIN SER CALC-MCNC: 1.8 G/DL (ref 1.5–4.5)
GLUCOSE SERPL-MCNC: 61 MG/DL (ref 65–99)
HAV AB SER QL IA: POSITIVE
POTASSIUM SERPL-SCNC: 3.8 MMOL/L (ref 3.5–5.2)
PROT SERPL-MCNC: 5.8 G/DL (ref 6–8.5)
SODIUM SERPL-SCNC: 139 MMOL/L (ref 134–144)

## 2021-01-12 ENCOUNTER — OFFICE VISIT (OUTPATIENT)
Dept: HEMATOLOGY | Age: 66
End: 2021-01-12
Payer: MEDICARE

## 2021-01-12 VITALS
BODY MASS INDEX: 26.06 KG/M2 | RESPIRATION RATE: 16 BRPM | HEIGHT: 65 IN | TEMPERATURE: 98.1 F | DIASTOLIC BLOOD PRESSURE: 74 MMHG | OXYGEN SATURATION: 97 % | WEIGHT: 156.4 LBS | HEART RATE: 75 BPM | SYSTOLIC BLOOD PRESSURE: 124 MMHG

## 2021-01-12 DIAGNOSIS — K75.4 AUTOIMMUNE HEPATITIS (HCC): Primary | ICD-10-CM

## 2021-01-12 PROCEDURE — G8754 DIAS BP LESS 90: HCPCS | Performed by: HOSPITALIST

## 2021-01-12 PROCEDURE — G8536 NO DOC ELDER MAL SCRN: HCPCS | Performed by: HOSPITALIST

## 2021-01-12 PROCEDURE — G0463 HOSPITAL OUTPT CLINIC VISIT: HCPCS | Performed by: HOSPITALIST

## 2021-01-12 PROCEDURE — G8400 PT W/DXA NO RESULTS DOC: HCPCS | Performed by: HOSPITALIST

## 2021-01-12 PROCEDURE — 1090F PRES/ABSN URINE INCON ASSESS: CPT | Performed by: HOSPITALIST

## 2021-01-12 PROCEDURE — 1101F PT FALLS ASSESS-DOCD LE1/YR: CPT | Performed by: HOSPITALIST

## 2021-01-12 PROCEDURE — G8427 DOCREV CUR MEDS BY ELIG CLIN: HCPCS | Performed by: HOSPITALIST

## 2021-01-12 PROCEDURE — 99214 OFFICE O/P EST MOD 30 MIN: CPT | Performed by: HOSPITALIST

## 2021-01-12 PROCEDURE — G8752 SYS BP LESS 140: HCPCS | Performed by: HOSPITALIST

## 2021-01-12 PROCEDURE — G8432 DEP SCR NOT DOC, RNG: HCPCS | Performed by: HOSPITALIST

## 2021-01-12 PROCEDURE — G8419 CALC BMI OUT NRM PARAM NOF/U: HCPCS | Performed by: HOSPITALIST

## 2021-01-12 PROCEDURE — 3017F COLORECTAL CA SCREEN DOC REV: CPT | Performed by: HOSPITALIST

## 2021-01-12 PROCEDURE — G9899 SCRN MAM PERF RSLTS DOC: HCPCS | Performed by: HOSPITALIST

## 2021-01-12 RX ORDER — BISMUTH SUBSALICYLATE 262 MG
1 TABLET,CHEWABLE ORAL DAILY
COMMUNITY

## 2021-01-12 RX ORDER — ACETAMINOPHEN 500 MG
TABLET ORAL 2 TIMES DAILY
COMMUNITY

## 2021-01-12 RX ORDER — PREDNISONE 2.5 MG/1
17.5 TABLET ORAL
Qty: 30 TAB | Refills: 1 | Status: SHIPPED | OUTPATIENT
Start: 2021-01-12 | End: 2021-03-20

## 2021-01-12 RX ORDER — FOLIC ACID 1 MG/1
TABLET ORAL
COMMUNITY
Start: 2021-01-04

## 2021-01-12 RX ORDER — CALCIUM CARBONATE 600 MG
600 TABLET ORAL DAILY
COMMUNITY

## 2021-01-12 NOTE — PROGRESS NOTES
[address] [facility]      Anahi Hatch MD, Sneha Castle MD, MPH      Rufino Patel, PA-DOROTHY Correa, [facility]-BC     Anisa Kate, [facility]   Davin Major, P-C    Em Duncan, [facility]       Charles Valdez [facility] [address]    at [address], [address] [place] [address], [facility] 22.    [phone]    FAX: [address] [place] [address] [address], [address]    [phone]    FAX: [phone]     Patient Care Team:  Imelda Olivera MD as PCP - General (Internal Medicine)  Bronson Lopez MD as Physician (Obstetrics & Gynecology)  Analilia Lara MD (Breast Surgery)  Aries Muniz MD (Hematology and Oncology)  Natali Lopez MD (Hepatology)    Problem List  Date Reviewed: 12/10/2020          Codes Class Noted    Autoimmune hepatitis [facility]) ICD-10-CM: K75.4  ICD-9-CM: 571.42  11/27/2020        Abnormal liver enzymes ICD-10-CM: R74.8  ICD-9-CM: 790.5  10/29/2020        Hypertension ICD-10-CM: I10  ICD-9-CM: 401.9  Unknown        Fatigue ICD-10-CM: R53.83  ICD-9-CM: 780.79  10/20/2020        Nausea ICD-10-CM: R11.0  ICD-9-CM: 787.02  10/20/2020            Kranthi Hunt returns to the liver institute of [address] [place] [address] for follow up and management of Autoimmune Hepatitis  The active problem list, all pertinent past medical history, medications, radiologic findings, liver biopsy and laboratory findings related to the liver disorder were reviewed with the patient. The patient is a 72 y.o. Black female who was found to have elevated liver enzymes in 10/2020 during recent hospital admission for severe fatigue. Liver enzymes performed during admission on 10/23 showed AST 1,761, ALT 2669, , TP 5.8, Alb 2.7.    Serologic evaluation for markers of chronic liver disease was positive for ASMA 25, ferritin 6,200, iron saturation 93%. Acute hepatitis panel was negative. AMA < 20, ceruloplasmin 30.2,  CA 19-9 - 91. Imaging of the liver with CT scan without contrast performed 10/20 showed periportal edema vs intrahepatic biliary ductal dilation. MRI/MRCP of the liver with and without contrast performed 10/20 demonstrated no pancreatic or biliary dilation. No overt pancreatic or ampullary mass. Small indeterminate hepatic lesion. A liver biopsy was performed on 10/22. This demonstrated chronic hepatitis (grade 3/4, stage 2-3/4, Elvin-Constantino). Mixed inflammatory cells including lymphocytes, neutrophils with conspicuous plasma cells and eosinophils. Patient reports she was recently started on prilosec. She reports she is on women vitamins, tumeric 1000 mg, apple cidar vinegar tablet, biotin 100 mcg , Vitamin C 250 mg daily, allegra 180 mg daily. She was treated with ciprofloxacin in 08/2020 for gastroenteritis. Patient current reports fatigue, yellowness of the eyes and skin, dark urine, pale stool and poor appetite. She also notes joint pains which are widespread but improving. Patient denies abdominal pain over the liver, ascites, lower extremity swelling, confusion or problems with concentration. The patient completes all daily activities without any functional limitations. Interval History  Patient was seen in the clinic about a month ago. She is feeling overall better. She had nausea and diarrhea earlier but now resolved. She also reports rash on the chest described as red but has gotten worse over the last one month.     Blood work performed 1/8 showed AST 29, ALT 25, ALP 37, Total bili 0.6     Patient is on prednisone 20 mg daily and azathioprine 75 mg daily     Health maintenance  Variceal surveillance None  CRC screening:  [facility] [address] surveillance: MRI 10/20: Small indeterminate hepatic lesion  Hepatitis A serology: Sent  Hepatitis B serology: Not determined  Influenza: Received 10/2020  Pneumonia: Prevnar followed by Pneumovax after 8 weeks    ASSESSMENT AND PLAN:    Autoimmune hepatitis:   The diagnosis is based on laboratory studies liver biopsy, and serologies that is positive for ASMA  Liver biopsy demonstrates stage 3/bridging  Fibrosis. Liver enzymes have trended down to normal    She is on prednisone 20 mg daily and Azathioprine 75 mg daily    We will begin to taper prednisone by 2.5 mg every 2 weeks. We will continue Azathioprine 75 mg daily for now. Repeat LFT in 2 weeks     Elevated liver enzymes  Resolved    Screening for Hepatocellular Carcinoma  HCC screening is not necessary if the patient has no evidence of cirrhosis. Imaging shows indeterminate hepatic lesions suggestive of sclerosing hemangioma  AFP was negative. CA 19-9 was elevated but unremarkable. Treatment of other medical problems in patients with chronic liver disease  There are no contraindications for the patient to take most medications that are necessary for treatment of other medical issues. Counseling for alcohol in patients with chronic liver disease  The patient does not consume any significant amount of alcohol. Vaccinations   Vaccination for viral hepatitis A is not needed. The patient has serologic evidence of prior exposure or vaccination with immunity  Routine vaccinations against other bacterial and viral agents can be performed as indicated. Annual flu vaccination should be administered if indicated. Elevation in Ferritin  There is an elevation in ferritin with Elevated iron saturation. HFE genetic testing was negative for any known mutations    The elevation in ferritin reflects hepatic inflammation. Hepatic lesion:   Possibly due to sclerosing hemangioma  This is seen on imaging.  This will be monitored    ALLERGIES  Allergies   Allergen Reactions    Sulfa (Sulfonamide Antibiotics) Unknown (comments)     Pt denies    Iodine Itching       MEDICATIONS  Current Outpatient Medications   Medication Sig    folic acid (FOLVITE) 1 mg tablet     calcium carbonate (CALTREX) 600 mg calcium (1,500 mg) tablet Take 600 mg by mouth two (2) times a day.  cholecalciferol (VITAMIN D3) (2,000 UNITS /50 MCG) cap capsule Take  by mouth two (2) times a day.  multivitamin (ONE A DAY) tablet Take 1 Tab by mouth daily.  azaTHIOprine (IMURAN) 50 mg tablet Take 1.5 Tabs by mouth daily. Indications: liver inflammation resulting from an abnormal immune response    ondansetron hcl (ZOFRAN) 4 mg tablet Take 1 Tab by mouth every eight (8) hours as needed for Nausea or Vomiting.  losartan (COZAAR) 50 mg tablet Take 25 mg by mouth nightly. 1/2 tab = 25 mg     predniSONE (DELTASONE) 10 mg tablet Take 40 mg by mouth daily (with breakfast). Indications: Autoimmune hepatitis (Patient taking differently: Take 20 mg by mouth daily (with breakfast). Indications: Autoimmune hepatitis)    peg 400-propylene glycol (Systane, propylene glycol,) 0.4-0.3 % drop Administer 1 Drop to both eyes as needed. No current facility-administered medications for this visit. SYSTEM REVIEW NOT RELATED TO LIVER DISEASE OR REVIEWED ABOVE:  Constitution systems: Negative for fever, chills, weight gain, weight loss. Eyes: Negative for visual changes. ENT: Negative for sore throat, painful swallowing. Respiratory: Negative for cough, hemoptysis, SOB. Cardiology: Negative for chest pain, palpitations. GI:  Negative for constipation or diarrhea. : Negative for urinary frequency, dysuria, hematuria, nocturia. Skin: Negative for rash. Hematology: Negative for easy bruising, blood clots. Musculo-skelatal: Positive for weakness and joint pains  Neurologic: Negative for headaches, dizziness, vertigo, memory problems not related to HE. Psychology: Negative for anxiety, depression. FAMILY HISTORY:  The father is .  of MI  The mother is .  of recurrent stroke  There is no family history of liver disease. The patient sister has SLE    SOCIAL HISTORY:  The patient is . The patient has 3 children and 9 grand children  The patient has never used tobacco products. The patient consumes alcohol on social occasions never in excess. The patient is retired. 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 Eyes: anicteric  ENT: No oral lesions. Thyroid normal.  Nodes: No adenopathy. Skin: No spider angiomata. No jaundice. No palmar erythema. Rash upper chest  Respiratory: Lungs clear to auscultation. Cardiovascular: Regular heart rate. No murmurs. No JVD. Abdomen: Soft non-tender. Liver size normal to percussion/palpation. Spleen not palpable. No obvious ascites. Extremities: No edema. No muscle wasting. No gross arthritic changes. Neurologic: Alert and oriented. Cranial nerves grossly intact. No asterixis. LABORATORY STUDIES:  Recent liver function panel, CBC with platelet count and BMP are not available. These studies will be performed.   Liver [place] of [address] [place] [address] Units 2021   WBC 3.4 - 10.8 x10E3/uL 6.3    ANC 1.4 - 7.0 x10E3/uL 3.7    HGB 11.1 - 15.9 g/dL 14.2     - 450 x10E3/uL 196    INR 0.9 - 1.2     AST 0 - 40 IU/L 29 36   ALT 0 - 32 IU/L 25 37 (H)   Alk Phos 39 - 117 IU/L 37 (L) 52   Bili, Total 0.0 - 1.2 mg/dL 0.6 1.2   Bili, Direct 0.0 - 0.2 MG/DL     Albumin 3.8 - 4.8 g/dL 4.0 3.9   BUN 8 - 27 mg/dL 8 17   Creat 0.57 - 1.00 mg/dL 0.82 0.96   Na 134 - 144 mmol/L 139 136   K 3.5 - 5.2 mmol/L 3.8 4.0   Cl 96 - 106 mmol/L 96 94 (L)   CO2 20 - 29 mmol/L 28 30 (H)   Glucose 65 - 99 mg/dL 61 (L) 78     SEROLOGIES:  Not available or performed. Testing was performed today.   Liver Functions & Hep Serologies Latest Ref Rng & Units 10/23/2020    Hep A, IgM NR       Hep B S Ag Index     Hep B C Ab IgM NR       Serologies Latest Ref Rng & Units 10/23/2020    Hep B Surface Ag Index     Hep B Surface Ag Interp NEG       Hep C Ab NR       Ferritin 26 - 388 NG/ML 5,297 (H)    Iron % Saturation 20 - 50 %     ASMCA 0 - 19 Units     M2 Ab 0.0 - 20.0 Units     Ceruloplasmin 19.0 - 39.0 mg/dL       Liver Functions & Hep Serologies Latest Ref Rng & Units 10/22/2020 10/21/2020   Hep A, IgM NR       Hep B S Ag Index     Hep B C Ab IgM NR       Serologies Latest Ref Rng & Units 10/22/2020    Hep B Surface Ag Index     Hep B Surface Ag Interp NEG       Hep C Ab NR       Ferritin 26 - 388 NG/ML 6,200 (H)    Iron % Saturation 20 - 50 % 93 (H)    ASMCA 0 - 19 Units 25 (H)    M2 Ab 0.0 - 20.0 Units <20.0    Ceruloplasmin 19.0 - 39.0 mg/dL 30.2      Liver Functions & Hep Serologies Latest Ref Rng & Units 10/20/2020   Hep A, IgM NR   NONREACTIVE   Hep B S Ag Index <0.10   Hep B C Ab IgM NR   NONREACTIVE   Serologies Latest Ref Rng & Units 10/20/2020   Hep B Surface Ag Index <0.10   Hep B Surface Ag Interp NEG   Negative   Hep C Ab NR   NONREACTIVE   Ferritin 26 - 388 NG/ML    Iron % Saturation 20 - 50 %    ASMCA 0 - 19 Units    M2 Ab 0.0 - 20.0 Units    Ceruloplasmin 19.0 - 39.0 mg/dL      Immunoglobulin: 1,160  Serologies Latest Ref Rng & Units 1/8/2021 10/23/2020   Hep A Ab, Total Negative Positive (A)    Hep B Surface Ag Index     Hep B Surface Ag Interp NEG       Hep C Ab NR       Ferritin 26 - 388 NG/ML  5,297 (H)   Iron % Saturation 20 - 50 %     ASMCA 0 - 19 Units     M2 Ab 0.0 - 20.0 Units     Ceruloplasmin 19.0 - 39.0 mg/dL       Serologies Latest Ref Rng & Units 10/22/2020   Hep A Ab, Total Negative    Hep B Surface Ag Index    Hep B Surface Ag Interp NEG      Hep C Ab NR      Ferritin 26 - 388 NG/ML 6,200 (H) Iron % Saturation 20 - 50 % 93 (H)   ASMCA 0 - 19 Units 25 (H)   M2 Ab 0.0 - 20.0 Units <20.0   Ceruloplasmin 19.0 - 39.0 mg/dL 30.2     Serologies Latest Ref Rng & Units 1/8/2021   Hep A Ab, Total Negative Positive (A)   Hep B Surface Ag Index    Hep B Surface Ag Interp NEG          LIVER HISTOLOGY:  10/22: Liver bx:  Chronic hepatitis (grade 3/4, stage 2-3/4, Elvin-Constantino)     ENDOSCOPIC PROCEDURES:  Not available or performed    RADIOLOGY:  10/20: MRI/MRCP liver: . Small indeterminate hepatic lesion is of questionable significance. A  sclerosing hemangioma is favored. 10/20: CT scan WO Liver: Periportal edema vs intrahepatic biliary ductal dilation    OTHER TESTING:  Not available or performed    FOLLOW-UP:  All of the issues listed above in the Assessment and Plan were discussed with the patient. All questions were answered. The patient expressed a clear understanding of the above. [address] [place] [address] in 6 weeks for routine monitoring and to review all data and determine the treatment plan.     Maury Sanchez MD, MPH  Advanced Hepatology  [facility] of [address] [address] [address], [address] [facility] 22.  [phone]  [address] [place] [address]

## 2021-01-12 NOTE — PATIENT INSTRUCTIONS
We will begin to reduce prednisone by 2.5 mg every 2 weeks  
 
We will continue Azathioprine at 75 mg daily

## 2021-01-12 NOTE — PROGRESS NOTES
Identified pt with two pt identifiers(name and ). Reviewed record in preparation for visit and have obtained necessary documentation. Chief Complaint   Patient presents with    Hepatitis C     F/U      Vitals:    21 1312   BP: 124/74   Pulse: 75   Resp: 16   Temp: 98.1 °F (36.7 °C)   TempSrc: Temporal   SpO2: 97%   Weight: 156 lb 6.4 oz (70.9 kg)   Height: 5' 5\" (1.651 m)   PainSc:   0 - No pain       Health Maintenance Review: Patient reminded of \"due or due soon\" health maintenance. I have asked the patient to contact his/her primary care provider (PCP) for follow-up on his/her health maintenance. Coordination of Care Questionnaire:  :   1) Have you been to an emergency room, urgent care, or hospitalized since your last visit? If yes, where when, and reason for visit? no       2. Have seen or consulted any other health care provider since your last visit? If yes, where when, and reason for visit? YES, Leann Jarquin () 20 Bone Density test 11/10/2020    Patient is accompanied by self I have received verbal consent from Chilo Ansari to discuss any/all medical information while they are present in the room.

## 2021-01-13 RX ORDER — PREDNISONE 10 MG/1
10 TABLET ORAL
Qty: 90 TAB | Refills: 2 | Status: SHIPPED | OUTPATIENT
Start: 2021-01-13 | End: 2021-03-20

## 2021-01-14 RX ORDER — PREDNISONE 5 MG/1
5 TABLET ORAL DAILY
Qty: 90 TAB | Refills: 5 | Status: SHIPPED | OUTPATIENT
Start: 2021-01-14 | End: 2021-03-20

## 2021-01-14 NOTE — PATIENT INSTRUCTIONS
Well Visit, Over 72: Care Instructions Your Care Instructions Physical exams can help you stay healthy. Your doctor has checked your overall health and may have suggested ways to take good care of yourself. He or she also may have recommended tests. At home, you can help prevent illness with healthy eating, regular exercise, and other steps. Follow-up care is a key part of your treatment and safety. Be sure to make and go to all appointments, and call your doctor if you are having problems. It's also a good idea to know your test results and keep a list of the medicines you take. How can you care for yourself at home? · Reach and stay at a healthy weight. This will lower your risk for many problems, such as obesity, diabetes, heart disease, and high blood pressure. · Get at least 30 minutes of exercise on most days of the week. Walking is a good choice. You also may want to do other activities, such as running, swimming, cycling, or playing tennis or team sports. · Do not smoke. Smoking can make health problems worse. If you need help quitting, talk to your doctor about stop-smoking programs and medicines. These can increase your chances of quitting for good. · Protect your skin from too much sun. When you're outdoors from 10 a.m. to 4 p.m., stay in the shade or cover up with clothing and a hat with a wide brim. Wear sunglasses that block UV rays. Even when it's cloudy, put broad-spectrum sunscreen (SPF 30 or higher) on any exposed skin. · See a dentist one or two times a year for checkups and to have your teeth cleaned. · Wear a seat belt in the car. Follow your doctor's advice about when to have certain tests. These tests can spot problems early. For men and women · Cholesterol. Your doctor will tell you how often to have this done based on your overall health and other things that can increase your risk for heart attack and stroke. · Blood pressure. Have your blood pressure checked during a routine doctor visit. Your doctor will tell you how often to check your blood pressure based on your age, your blood pressure results, and other factors. · Diabetes. Ask your doctor whether you should have tests for diabetes. · Vision. Experts recommend that you have yearly exams for glaucoma and other age-related eye problems. · Hearing. Tell your doctor if you notice any change in your hearing. You can have tests to find out how well you hear. · Colon cancer tests. Keep having colon cancer tests as your doctor recommends. You can have one of several types of tests. · Heart attack and stroke risk. At least every 4 to 6 years, you should have your risk for heart attack and stroke assessed. Your doctor uses factors such as your age, blood pressure, cholesterol, and whether you smoke or have diabetes to show what your risk for a heart attack or stroke is over the next 10 years. · Osteoporosis. Talk to your doctor about whether you should have a bone density test to find out whether you have thinning bones. Ask your doctor if you need to take a calcium plus vitamin D supplement. You may be able to get enough calcium and vitamin D through your diet. For women · Pap test and pelvic exam. You may no longer need a Pap test. Talk with your doctor about whether to stop or continue to have Pap tests. · Breast exam and mammogram. Ask how often you should have a mammogram, which is an X-ray of your breasts. A mammogram can spot breast cancer before it can be felt and when it is easiest to treat. · Thyroid disease. Talk to your doctor about whether to have your thyroid checked as part of a regular physical exam. Women have an increased chance of a thyroid problem. For men · Prostate exam. Talk to your doctor about whether you should have a blood test (called a PSA test) for prostate cancer. Experts recommend that you discuss the benefits and risks of the test with your doctor before you decide whether to have this test. Some experts say that men ages 79 and older no longer need testing. · Abdominal aortic aneurysm. Ask your doctor whether you should have a test to check for an aneurysm. You may need a test if you ever smoked or if your parent, brother, sister, or child has had an aneurysm. When should you call for help? Watch closely for changes in your health, and be sure to contact your doctor if you have any problems or symptoms that concern you. Where can you learn more? Go to http://www.gray.com/ Enter P792 in the search box to learn more about \"Well Visit, Over 65: Care Instructions. \" Current as of: May 27, 2020               Content Version: 12.6 © 2630-1331 [employer]. Care instructions adapted under license by [employer] (which disclaims liability or warranty for this information). If you have questions about a medical condition or this instruction, always ask your healthcare professional. [place] [address] any warranty or liability for your use of this information.

## 2021-01-15 ENCOUNTER — OFFICE VISIT (OUTPATIENT)
Dept: OBGYN CLINIC | Age: 66
End: 2021-01-15
Payer: MEDICARE

## 2021-01-15 VITALS — WEIGHT: 155 LBS | SYSTOLIC BLOOD PRESSURE: 126 MMHG | BODY MASS INDEX: 25.79 KG/M2 | DIASTOLIC BLOOD PRESSURE: 76 MMHG

## 2021-01-15 DIAGNOSIS — Z01.411 ENCOUNTER FOR GYNECOLOGICAL EXAMINATION (GENERAL) (ROUTINE) WITH ABNORMAL FINDINGS: Primary | ICD-10-CM

## 2021-01-15 PROCEDURE — G9899 SCRN MAM PERF RSLTS DOC: HCPCS | Performed by: OBSTETRICS & GYNECOLOGY

## 2021-01-15 PROCEDURE — 1101F PT FALLS ASSESS-DOCD LE1/YR: CPT | Performed by: OBSTETRICS & GYNECOLOGY

## 2021-01-15 PROCEDURE — G8752 SYS BP LESS 140: HCPCS | Performed by: OBSTETRICS & GYNECOLOGY

## 2021-01-15 PROCEDURE — G8754 DIAS BP LESS 90: HCPCS | Performed by: OBSTETRICS & GYNECOLOGY

## 2021-01-15 PROCEDURE — 1090F PRES/ABSN URINE INCON ASSESS: CPT | Performed by: OBSTETRICS & GYNECOLOGY

## 2021-01-15 PROCEDURE — G0101 CA SCREEN;PELVIC/BREAST EXAM: HCPCS | Performed by: OBSTETRICS & GYNECOLOGY

## 2021-01-15 PROCEDURE — G8419 CALC BMI OUT NRM PARAM NOF/U: HCPCS | Performed by: OBSTETRICS & GYNECOLOGY

## 2021-01-15 PROCEDURE — 3017F COLORECTAL CA SCREEN DOC REV: CPT | Performed by: OBSTETRICS & GYNECOLOGY

## 2021-01-15 PROCEDURE — G8432 DEP SCR NOT DOC, RNG: HCPCS | Performed by: OBSTETRICS & GYNECOLOGY

## 2021-01-15 NOTE — PROGRESS NOTES
[facility] OB-GYN  [email or web address]  [phone]    Vin Cordero MD, [address] [facility]       Annual Gynecologic Exam:   WWE 60+  Chief Complaint   Patient presents with    Well Woman         Kranthi Hunt is a 72 y.o. No obstetric history on file. OTHER  female who presents for an annual exam.  Hospitalized for hepatitis, on steroids, had bmd. (outside)  She does not report additional concerns today. Sexual history and Contraception:  Social History     Substance and Sexual Activity   Sexual Activity Yes    Partners: Male    Birth control/protection: Surgical     She does not reports new sexual partner(s) in the last year. Preventive Medicine History:  Her most recent Pap smear result: normal was obtained in November 2018  Her most recent HR HPV screen was Negative obtained in 2018    She does not have a history of STEVEN 2, 3 or cervical cancer. Breast health:  Last mammogram: was normal.   A mammogram was scheduled for today. Breast cancer family updated: see FH. Bone health: Chaim Garcia She has had a bone density scan in the past. 11/2020 done with PCP. Last bone density test results: within normal limits. She does not have a history of osteopenia/osteoporosis. Osteoporosis family history updated: see FH.      Past Medical History:   Diagnosis Date    Bilateral dry eyes     Herpes simplex     Includes herpes labialis    History of mammogram 10/13/2016; 11/13/18; 01/03/2020    negative; Negative; negative birads 1 dense    Hx of mammogram 09/30/14; 10/1/15    normal; negative    Hypertension     Pap smear for cervical cancer screening 9/14/10; 9/18/12; 10/1/15; 11/13/18    Negative, HPV negative; Negative; Negative, HPV negative; Neg/Neg HPV     Past Surgical History:   Procedure Laterality Date    COLONOSCOPY N/A 12/5/2018    COLONOSCOPY performed by Nicole Chu MD at [facility] HX COLONOSCOPY  2006    Wnl    HX GYN  1982    tubal ligation    ME LAP,TUBAL MANDY       Family History   Problem Relation Age of Onset    Hypertension Mother     Arthritis-osteo Mother     Osteoporosis Mother     Cancer Mother 80        lymphoma    Stroke Mother     Hypertension Father     Stroke Father     Hypertension Sister     Arthritis-osteo Sister     Cancer Sister     Hypertension Brother     Cancer Maternal Uncle         colon     Social History     Socioeconomic History    Marital status:      Spouse name: Not on file    Number of children: Not on file    Years of education: Not on file    Highest education level: Not on file   Occupational History    Not on file   Social Needs    Financial resource strain: Not on file    Food insecurity     Worry: Not on file     Inability: Not on file    Transportation needs     Medical: Not on file     Non-medical: Not on file   Tobacco Use    Smoking status: Never Smoker    Smokeless tobacco: Never Used   Substance and Sexual Activity    Alcohol use: Not Currently     Alcohol/week: 2.0 standard drinks     Types: 2 Glasses of wine per week     Comment: previously drank 2 glasses of wine for 35 years    Drug use: No    Sexual activity: Yes     Partners: Male     Birth control/protection: Surgical   Lifestyle    Physical activity     Days per week: Not on file     Minutes per session: Not on file    Stress: Not on file   Relationships    Social connections     Talks on phone: Not on file     Gets together: Not on file     Attends Caodaism service: Not on file     Active member of club or organization: Not on file     Attends meetings of clubs or organizations: Not on file     Relationship status: Not on file    Intimate partner violence     Fear of current or ex partner: Not on file     Emotionally abused: Not on file     Physically abused: Not on file     Forced sexual activity: Not on file   Other Topics Concern    Not on file   Social History Narrative    Not on file       Allergies   Allergen Reactions  Sulfa (Sulfonamide Antibiotics) Unknown (comments)     Pt denies    Iodine Itching       Current Outpatient Medications   Medication Sig    cholecalciferol (VITAMIN D3) (2,000 UNITS /50 MCG) cap capsule Take  by mouth two (2) times a day.  predniSONE (DELTASONE) 2.5 mg tablet Take 7 Tabs by mouth daily (with breakfast). Indications: Autoimmune hepatitis    azaTHIOprine (IMURAN) 50 mg tablet Take 1.5 Tabs by mouth daily. Indications: liver inflammation resulting from an abnormal immune response    predniSONE (DELTASONE) 5 mg tablet Take 1 Tab by mouth daily.  predniSONE (DELTASONE) 10 mg tablet Take 10 mg by mouth daily (with breakfast).  folic acid (FOLVITE) 1 mg tablet     calcium carbonate (CALTREX) 600 mg calcium (1,500 mg) tablet Take 600 mg by mouth two (2) times a day.  multivitamin (ONE A DAY) tablet Take 1 Tab by mouth daily.  ondansetron hcl (ZOFRAN) 4 mg tablet Take 1 Tab by mouth every eight (8) hours as needed for Nausea or Vomiting.  losartan (COZAAR) 50 mg tablet Take 25 mg by mouth nightly. 1/2 tab = 25 mg     peg 400-propylene glycol (Systane, propylene glycol,) 0.4-0.3 % drop Administer 1 Drop to both eyes as needed. No current facility-administered medications for this visit.         Patient Active Problem List   Diagnosis Code    Hypertension I10    Fatigue R53.83    Nausea R11.0    Abnormal liver enzymes R74.8    Autoimmune hepatitis (HCC) K75.4       Review of Systems - History obtained from the patient and patient filled out questionnaire   Constitutional/general, HEENT, CV, Resp, GI, MSK, Neuro, Psych, Heme/lymph, Skin, Breast ROS: no significant complaints except as noted on HPI      Physical Exam  Visit Vitals  /76   Wt 155 lb (70.3 kg)   BMI 25.79 kg/m²       Constitutional  · Appearance: well-nourished, well developed, alert, in no acute distress    HENT  · Head and Face: appears normal    Neck  · Inspection/Palpation: normal appearance, no masses or tenderness  · Lymph Nodes: no lymphadenopathy present  · Thyroid: gland size normal, nontender, no nodules or masses present on palpation    Chest  · Respiratory Effort: breathing unlabored  · Auscultation: normal breath sounds    Cardiovascular  · Heart:  · Auscultation: regular rate and rhythm without murmur    Breasts  · Inspection of Breasts: breasts symmetrical, no skin changes, no discharge present, nipple appearance normal, no skin retraction present  · Palpation of Breasts and Axillae: no masses present on palpation, no breast tenderness  · Axillary Lymph Nodes: no lymphadenopathy present    Gastrointestinal  · Abdominal Examination: abdomen non-tender to palpation, normal bowel sounds, no masses present  · Liver and spleen: no hepatomegaly present, spleen not palpable  · Hernias: no hernias identified    Genitourinary  · External Genitalia: normal appearance for age, no discharge present, no tenderness present, no inflammatory lesions present, no masses present, with atrophy present  · Vagina: normal vaginal vault without central or paravaginal defects, no discharge present, no inflammatory lesions present, no masses present  · Bladder: non-tender to palpation  · Urethra: appears normal  · Cervix: normal   · Uterus: normal size, shape and consistency  · Adnexa: no adnexal tenderness present, no adnexal masses present  · Perineum: perineum within normal limits, no evidence of trauma, no rashes or skin lesions present  · Anus: anus within normal limits, no hemorrhoids present  · Inguinal Lymph Nodes: no lymphadenopathy present    Skin  · General Inspection: no rash, no lesions identified    Neurologic/Psychiatric  · Mental Status:  · Orientation: grossly oriented to person, place and time  · Mood and Affect: mood normal, affect appropriate    Assessment:  72 y.o. No obstetric history on file. for well woman exam  Encounter Diagnosis   Name Primary?     Encounter for gynecological examination (general) (routine) with abnormal findings Yes       Plan:  The patient was counseled about diet, exercise, healthy lifestyle  We discussed current self breast exam and mammogram recommendations  We discussed current pap smear and HR HPV testing guidelines  We recommend follow up in one year for routine annual gynecologic exam or sooner if needed  We recommend follow up with a primary care physician for any chronic medical problems or non-gynecologic concerns    We discussed calcium/vitamin D/weight bearing exercise and osteoporosis prevention and bone density screening recommendations. Handouts were given to the patient  Disc repeat bmd, ? 2 yrs, need report       Folllow up:  [x] return for annual well woman exam in one year or sooner if she is having problems  [] follow up and ultrasound  [] mammogram  [] 6 months  [] 6 weeks   []     Orders Placed This Encounter    PAP IG, APTIMA HPV AND RFX 64/10,51 ([id number])       Results for orders placed or performed during the hospital encounter of 01/15/21   LUCILLE 3D CANELO W MAMMO BI SCREENING INCL CAD    Narrative    STUDY: Bilateral digital screening mammogram with 3-D tomosynthesis    INDICATION:  Screening. COMPARISON: 2020, 2018-2015    BREAST COMPOSITION: There are scattered areas of fibroglandular density. FINDINGS: Bilateral digital screening mammography was performed and is  interpreted in conjunction with a computer assisted detection (CAD) system. Additionally, tomosynthesis of both breasts in the CC and MLO projections was  performed. No suspicious masses or calcifications are identified. There has been  no significant change. Impression    IMPRESSION:  BI-RADS 1: Negative. No mammographic evidence of malignancy. RECOMMENDATIONS:  Next screening mammogram is recommended in one year. The patient will be notified of these results.

## 2021-01-23 LAB
CYTOLOGIST CVX/VAG CYTO: NORMAL
CYTOLOGY CVX/VAG DOC CYTO: NORMAL
CYTOLOGY CVX/VAG DOC THIN PREP: NORMAL
CYTOLOGY HISTORY:: NORMAL
DX ICD CODE: NORMAL
HPV I/H RISK 4 DNA CVX QL PROBE+SIG AMP: NEGATIVE
Lab: NORMAL
OTHER STN SPEC: NORMAL
PATHOLOGIST CVX/VAG CYTO: NORMAL
STAT OF ADQ CVX/VAG CYTO-IMP: NORMAL

## 2021-01-23 NOTE — PROGRESS NOTES
Normal pap smear, message sent if [address] active. Update per TP protocol. Update PMH/HM: include:  Date of pap, Cytology: wnl. For HR HPV results: list NEG or POS, when done.

## 2021-02-09 LAB
ALBUMIN SERPL-MCNC: 3.9 G/DL (ref 3.8–4.8)
ALP SERPL-CCNC: 69 IU/L (ref 39–117)
ALT SERPL-CCNC: 69 IU/L (ref 0–32)
AST SERPL-CCNC: 73 IU/L (ref 0–40)
BILIRUB DIRECT SERPL-MCNC: 0.2 MG/DL (ref 0–0.4)
BILIRUB SERPL-MCNC: 0.5 MG/DL (ref 0–1.2)
PROT SERPL-MCNC: 6 G/DL (ref 6–8.5)

## 2021-02-10 DIAGNOSIS — K75.4 AUTOIMMUNE HEPATITIS (HCC): Primary | ICD-10-CM

## 2021-02-11 ENCOUNTER — TELEPHONE (OUTPATIENT)
Dept: HEMATOLOGY | Age: 66
End: 2021-02-11

## 2021-02-11 NOTE — TELEPHONE ENCOUNTER
Additional order was put in for patient recently. Patient is requesting for lab to be sent to [facility] at [place] [address] near [facility]. Will fax records to patient today.

## 2021-02-23 ENCOUNTER — VIRTUAL VISIT (OUTPATIENT)
Dept: HEMATOLOGY | Age: 66
End: 2021-02-23
Payer: MEDICARE

## 2021-02-23 DIAGNOSIS — K75.4 AUTOIMMUNE HEPATITIS (HCC): Primary | ICD-10-CM

## 2021-02-23 LAB
ALBUMIN SERPL-MCNC: 3.8 G/DL (ref 3.8–4.8)
ALP SERPL-CCNC: 168 IU/L (ref 39–117)
ALT SERPL-CCNC: 114 IU/L (ref 0–32)
AST SERPL-CCNC: 103 IU/L (ref 0–40)
BILIRUB DIRECT SERPL-MCNC: 0.47 MG/DL (ref 0–0.4)
BILIRUB SERPL-MCNC: 0.9 MG/DL (ref 0–1.2)
PROT SERPL-MCNC: 5.9 G/DL (ref 6–8.5)

## 2021-02-23 PROCEDURE — G0463 HOSPITAL OUTPT CLINIC VISIT: HCPCS | Performed by: HOSPITALIST

## 2021-02-23 PROCEDURE — G8432 DEP SCR NOT DOC, RNG: HCPCS | Performed by: HOSPITALIST

## 2021-02-23 PROCEDURE — G8428 CUR MEDS NOT DOCUMENT: HCPCS | Performed by: HOSPITALIST

## 2021-02-23 PROCEDURE — 3017F COLORECTAL CA SCREEN DOC REV: CPT | Performed by: HOSPITALIST

## 2021-02-23 PROCEDURE — G9899 SCRN MAM PERF RSLTS DOC: HCPCS | Performed by: HOSPITALIST

## 2021-02-23 PROCEDURE — G8756 NO BP MEASURE DOC: HCPCS | Performed by: HOSPITALIST

## 2021-02-23 PROCEDURE — 1090F PRES/ABSN URINE INCON ASSESS: CPT | Performed by: HOSPITALIST

## 2021-02-23 PROCEDURE — 1101F PT FALLS ASSESS-DOCD LE1/YR: CPT | Performed by: HOSPITALIST

## 2021-02-23 PROCEDURE — G8536 NO DOC ELDER MAL SCRN: HCPCS | Performed by: HOSPITALIST

## 2021-02-23 PROCEDURE — G8400 PT W/DXA NO RESULTS DOC: HCPCS | Performed by: HOSPITALIST

## 2021-02-23 PROCEDURE — 99214 OFFICE O/P EST MOD 30 MIN: CPT | Performed by: HOSPITALIST

## 2021-02-23 PROCEDURE — G8419 CALC BMI OUT NRM PARAM NOF/U: HCPCS | Performed by: HOSPITALIST

## 2021-02-23 RX ORDER — VITAMIN E (DL,TOCOPHERYL ACET) 180 MG
CAPSULE ORAL
COMMUNITY

## 2021-02-23 RX ORDER — OMEGA-3/DHA/EPA/FISH OIL 910-1400MG
CAPSULE ORAL
COMMUNITY

## 2021-02-23 NOTE — PROGRESS NOTES
VIRTUAL TELEHEALTH VISIT PERFORMED DUE TO COVID-19 EPIDEMIC    CONSENT:  Della Yang, who was seen by synchronous, real-time, audio-video technology, and/or her healthcare decision maker, is aware that this patient-initiated, Telehealth encounter on 2/23/2021 is a billable service, with coverage as determined by her insurance carrier. She is aware that she may receive a bill and has provided verbal consent to proceed. This patient was evaluated during a Virtual Telehealth visit. A caregiver was present if appropriate.  Due to this being a TeleHealth encounter performed during the XWJLN-38 public health emergency, the physical examination was limited to that listed in the [address] [facility] Bobby Calderon MD, [place], [place] Nish Peterson, Mat Escudero MD, MPH      Liset Proctor, SOLE Weiss, ACNP-BC     April S Humble, AGPCNP-BC   Mainor Ansari, [address] [place], AGPCNP-BC       [facility]    at [address].    [phone]    FAX: [address] [place] [address], [address]    [phone]    FAX: [phone]     Patient Care Team:  Feroz Glaser, Nikki Meeks MD as PCP - General (Internal Medicine)  Marlene Yates MD as Physician (Obstetrics & Gynecology)  Jorge Brody MD (Breast Surgery)  Meche De La Fuente MD (Hematology and Oncology)  Fallon Mancera MD (Hepatology)    Problem List  Date Reviewed: 1/15/2021          Codes Class Noted    Autoimmune hepatitis [facility]) ICD-10-CM: K75.4  ICD-9-CM: 571.42  11/27/2020        Abnormal liver enzymes ICD-10-CM: R74.8  ICD-9-CM: 790.5  10/29/2020        Hypertension ICD-10-CM: I10  ICD-9-CM: 401.9  Unknown        Fatigue ICD-10-CM: D85.76  ICD-9-CM: 780.79  10/20/2020        Nausea ICD-10-CM: R11.0  ICD-9-CM: 787.02  10/20/2020            The patient is a 72 y.o. Black female who was found to have elevated liver enzymes in 10/2020 during recent hospital admission for severe fatigue. Liver enzymes performed during admission on 10/23 showed AST 1,761, ALT 2669, , TP 5.8, Alb 2.7. Serologic evaluation for markers of chronic liver disease was positive for ASMA 25, ferritin 6,200, iron saturation 93%. Acute hepatitis panel was negative. AMA < 20, ceruloplasmin 30.2,  CA 19-9 - 91. Imaging of the liver with CT scan without contrast performed 10/20 showed periportal edema vs intrahepatic biliary ductal dilation. MRI/MRCP of the liver with and without contrast performed 10/20 demonstrated no pancreatic or biliary dilation. No overt pancreatic or ampullary mass. Small indeterminate hepatic lesion. A liver biopsy was performed on 10/22. This demonstrated chronic hepatitis (grade 3/4, stage 2-3/4, Elvin-Constantino). Mixed inflammatory cells including lymphocytes, neutrophils with conspicuous plasma cells and eosinophils. Patient reports she was recently started on prilosec. She reports she is on women vitamins, tumeric 1000 mg, apple cidar vinegar tablet, biotin 100 mcg , Vitamin C 250 mg daily, allegra 180 mg daily. She was treated with ciprofloxacin in 08/2020 for gastroenteritis. Patient current reports fatigue, yellowness of the eyes and skin, dark urine, pale stool and poor appetite. She also notes joint pains which are widespread but improving. Patient denies abdominal pain over the liver, ascites, lower extremity swelling, confusion or problems with concentration. The patient completes all daily activities without any functional limitations. Interval History  Patient was seen in the clinic on 1/12.     She reports easy bruising and petechia on her skin especially when she scraches    The most recent blood work perform on .2/22/2021. This shows , , , Total bilirubin 0.9    She is on prednisone 20 mg daily and Azathioprine 100 mg daily    Health maintenance  Variceal surveillance None  CRC screening:  [facility] [address] surveillance: MRI 10/20: Small indeterminate hepatic lesion  Hepatitis A serology: Immune  Hepatitis B serology: Not determined  Influenza: Received 10/2020  Pneumonia: Prevnar followed by Pneumovax after 8 weeks    ASSESSMENT AND PLAN:    Autoimmune hepatitis:   The diagnosis is based on laboratory studies liver biopsy, and serologies that is positive for ASMA  Liver biopsy demonstrates stage 3/bridging  Fibrosis. The is a bump in LFT;s    AST is elevated, ALT is elevated, ALP is elevated, total bilirubin is normal    We will continue current immuno-suppression- Prednisone 20 mg daily and Imuran 100 mg daily    We will perform repeat CBC, CMP in 4 weeks to monitor       Screening for Hepatocellular Carcinoma  HCC screening is not necessary if the patient has no evidence of cirrhosis. Imaging shows indeterminate hepatic lesions suggestive of sclerosing hemangioma  AFP was negative. CA 19-9 was elevated but unremarkable. Treatment of other medical problems in patients with chronic liver disease  There are no contraindications for the patient to take most medications that are necessary for treatment of other medical issues. Counseling for alcohol in patients with chronic liver disease  The patient does not consume any significant amount of alcohol. Vaccinations   Vaccination for viral hepatitis A is not needed. The patient has serologic evidence of prior exposure or vaccination with immunity  Routine vaccinations against other bacterial and viral agents can be performed as indicated. Annual flu vaccination should be administered if indicated. Elevation in Ferritin  There is an elevation in ferritin with Elevated iron saturation.     HFE genetic testing was negative for any known mutations    The elevation in ferritin reflects hepatic inflammation. Hepatic lesion:   Possibly due to sclerosing hemangioma  This is seen on imaging. This will be monitored    ALLERGIES  Allergies   Allergen Reactions    Sulfa (Sulfonamide Antibiotics) Unknown (comments)     Pt denies    Iodine Itching       MEDICATIONS  Current Outpatient Medications   Medication Sig    omega-3-dha-epa-fish oil (Omega-3 Fish Oil) 910-1,400 mg cap Take  by mouth.  turmeric/turmeric ext/pepr ext (turmeric-turmeric ext-pepper) 500-3 mg cap Take  by mouth.  predniSONE (DELTASONE) 10 mg tablet Take 10 mg by mouth daily (with breakfast). (Patient taking differently: Take 20 mg by mouth daily (with breakfast). )    folic acid (FOLVITE) 1 mg tablet     calcium carbonate (CALTREX) 600 mg calcium (1,500 mg) tablet Take 600 mg by mouth two (2) times a day.  cholecalciferol (VITAMIN D3) (2,000 UNITS /50 MCG) cap capsule Take  by mouth two (2) times a day.  multivitamin (ONE A DAY) tablet Take 1 Tab by mouth daily.  azaTHIOprine (IMURAN) 50 mg tablet Take 1.5 Tabs by mouth daily. Indications: liver inflammation resulting from an abnormal immune response (Patient taking differently: Take 100 mg by mouth daily. Indications: liver inflammation resulting from an abnormal immune response)    losartan (COZAAR) 50 mg tablet Take 25 mg by mouth nightly. 1/2 tab = 25 mg     predniSONE (DELTASONE) 5 mg tablet Take 1 Tab by mouth daily.  predniSONE (DELTASONE) 2.5 mg tablet Take 7 Tabs by mouth daily (with breakfast). Indications: Autoimmune hepatitis    ondansetron hcl (ZOFRAN) 4 mg tablet Take 1 Tab by mouth every eight (8) hours as needed for Nausea or Vomiting.  peg 400-propylene glycol (Systane, propylene glycol,) 0.4-0.3 % drop Administer 1 Drop to both eyes as needed. No current facility-administered medications for this visit.         SYSTEM REVIEW NOT RELATED TO LIVER DISEASE OR REVIEWED ABOVE:  Constitution systems: Negative for fever, chills, weight gain, weight loss. Eyes: Negative for visual changes. ENT: Negative for sore throat, painful swallowing. Respiratory: Negative for cough, hemoptysis, SOB. Cardiology: Negative for chest pain, palpitations. GI:  Negative for constipation or diarrhea. : Negative for urinary frequency, dysuria, hematuria, nocturia. Skin: Negative for rash. Hematology: Negative for easy bruising, blood clots. Musculo-skelatal: Positive for weakness and joint pains  Neurologic: Negative for headaches, dizziness, vertigo, memory problems not related to HE. Psychology: Negative for anxiety, depression. FAMILY HISTORY:  The father is .  of MI  The mother is .  of recurrent stroke  There is no family history of liver disease. The patient sister has SLE    SOCIAL HISTORY:  The patient is . The patient has 3 children and 9 grand children  The patient has never used tobacco products. The patient consumes alcohol on social occasions never in excess. The patient is retired. 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 Eyes: Sclera anicteric. ENT: No oral lesions. Skin: No rashes. spider angiomata. No jaundice. Abdomen: No obvious distention suggesting ascites. Extremities: No edema. No muscle wasting. Neurologic: Alert and oriented. Cranial nerves grossly intact. LABORATORY STUDIES:  Recent liver function panel, CBC with platelet count and BMP are not available. These studies will be performed.   Liver [place] of [address] [place] [address] Units 2021   WBC 3.4 - 10.8 x10E3/uL   6.3   ANC 1.4 - 7.0 x10E3/uL   3.7   HGB 11.1 - 15.9 g/dL   14.2    - 450 x10E3/uL   196   INR 0.9 - 1.2      AST 0 - 40 IU/L 103 (H) 73 (H) 29   ALT 0 - 32 IU/L 114 (H) 69 (H) 25   Alk Phos 39 - 117 IU/L 168 (H) 69 37 (L)   Bili, Total 0.0 - 1.2 mg/dL 0.9 0.5 0.6   Bili, Direct 0.00 - 0.40 mg/dL 0.47 (H) 0.20    Albumin 3.8 - 4.8 g/dL 3.8 3.9 4.0   BUN 8 - 27 mg/dL   8   Creat 0.57 - 1.00 mg/dL   0.82   Na 134 - 144 mmol/L   139   K 3.5 - 5.2 mmol/L   3.8   Cl 96 - 106 mmol/L   96   CO2 20 - 29 mmol/L   28   Glucose 65 - 99 mg/dL   61 (L)   Magnesium 1.6 - 2.4 mg/dL          SEROLOGIES:  Not available or performed. Testing was performed today.   Liver Functions & Hep Serologies Latest Ref Rng & Units 10/23/2020    Hep A, IgM NR       Hep B S Ag Index     Hep B C Ab IgM NR       Serologies Latest Ref Rng & Units 10/23/2020    Hep B Surface Ag Index     Hep B Surface Ag Interp NEG       Hep C Ab NR       Ferritin 26 - 388 NG/ML 5,297 (H)    Iron % Saturation 20 - 50 %     ASMCA 0 - 19 Units     M2 Ab 0.0 - 20.0 Units     Ceruloplasmin 19.0 - 39.0 mg/dL       Liver Functions & Hep Serologies Latest Ref Rng & Units 10/22/2020 10/21/2020   Hep A, IgM NR       Hep B S Ag Index     Hep B C Ab IgM NR       Serologies Latest Ref Rng & Units 10/22/2020    Hep B Surface Ag Index     Hep B Surface Ag Interp NEG       Hep C Ab NR       Ferritin 26 - 388 NG/ML 6,200 (H)    Iron % Saturation 20 - 50 % 93 (H)    ASMCA 0 - 19 Units 25 (H)    M2 Ab 0.0 - 20.0 Units <20.0    Ceruloplasmin 19.0 - 39.0 mg/dL 30.2      Liver Functions & Hep Serologies Latest Ref Rng & Units 10/20/2020   Hep A, IgM NR   NONREACTIVE   Hep B S Ag Index <0.10   Hep B C Ab IgM NR   NONREACTIVE   Serologies Latest Ref Rng & Units 10/20/2020   Hep B Surface Ag Index <0.10   Hep B Surface Ag Interp NEG   Negative   Hep C Ab NR   NONREACTIVE   Ferritin 26 - 388 NG/ML    Iron % Saturation 20 - 50 %    ASMCA 0 - 19 Units    M2 Ab 0.0 - 20.0 Units    Ceruloplasmin 19.0 - 39.0 mg/dL      Immunoglobulin: 1,160  Serologies Latest Ref Rng & Units 1/8/2021 10/23/2020   Hep A Ab, Total Negative Positive (A)    Hep B Surface Ag Index     Hep B Surface Ag Interp NEG       Hep C Ab NR       Ferritin 26 - 388 NG/ML  5,297 (H)   Iron % Saturation 20 - 50 %     ASMCA 0 - 19 Units     M2 Ab 0.0 - 20.0 Units     Ceruloplasmin 19.0 - 39.0 mg/dL       Serologies Latest Ref Rng & Units 10/22/2020   Hep A Ab, Total Negative    Hep B Surface Ag Index    Hep B Surface Ag Interp NEG      Hep C Ab NR      Ferritin 26 - 388 NG/ML 6,200 (H)   Iron % Saturation 20 - 50 % 93 (H)   ASMCA 0 - 19 Units 25 (H)   M2 Ab 0.0 - 20.0 Units <20.0   Ceruloplasmin 19.0 - 39.0 mg/dL 30.2     Serologies Latest Ref Rng & Units 1/8/2021   Hep A Ab, Total Negative Positive (A)   Hep B Surface Ag Index    Hep B Surface Ag Interp NEG          LIVER HISTOLOGY:  10/22: Liver bx:  Chronic hepatitis (grade 3/4, stage 2-3/4, Elvin-Constantino)     ENDOSCOPIC PROCEDURES:  Not available or performed    RADIOLOGY:  10/20: MRI/MRCP liver: . Small indeterminate hepatic lesion is of questionable significance. A  sclerosing hemangioma is favored. 10/20: CT scan WO Liver: Periportal edema vs intrahepatic biliary ductal dilation    OTHER TESTING:  Not available or performed      FOLLOW-UP AFTER VIRTUAL VISIT:  Pursuant to the emergency declaration under the [facility] waiver authority and the [employer] Act, this Virtual  Visit was conducted, with the patient's (and/or their legal guardian's) consent, to reduce the patient's risk of exposure to COVID-19 and provide necessary medical care. Services were provided through a video synchronous discussion virtually to substitute for an in-person clinic visit. The patient was located in their home. The provider was located in the [facility] [employer] office. All of the issues listed above in the Assessment and Plan were discussed with the patient. All questions were answered. The patient expressed a clear understanding of the above.     Because of the COVID-19 epidemic a follow-up appointment will be performed viaTeleHealth in 3 months for routine monitoring and to review all data and determine the treatment plan. Orders to obtain laboratory testing will be mailed to the patient and obtained 1 week prior to the next TeleHealth or in-person encounter.       Adiel Clark MD, MPH  Advanced Hepatology  [facility] of [address] [address] [address], [address] [place] [address] 22.  [phone]  [facility] [address]

## 2021-02-23 NOTE — PROGRESS NOTES
Identified pt with two pt identifiers(name and ). Reviewed record in preparation for visit and have obtained necessary documentation. Chief Complaint   Patient presents with    Other     autoimmune hepatitis      There were no vitals filed for this visit. Health Maintenance Review: Patient reminded of \"due or due soon\" health maintenance. I have asked the patient to contact his/her primary care provider (PCP) for follow-up on his/her health maintenance. Coordination of Care Questionnaire:  :   1) Have you been to an emergency room, urgent care, or hospitalized since your last visit? If yes, where when, and reason for visit? no       2. Have seen or consulted any other health care provider since your last visit? If yes, where when, and reason for visit? NO      Patient is accompanied by  I have received verbal consent from Chilo Ansari to discuss any/all medical information while they are present in the room.

## 2021-03-08 ENCOUNTER — IMMUNIZATION (OUTPATIENT)
Dept: INTERNAL MEDICINE CLINIC | Age: 66
End: 2021-03-08
Payer: MEDICARE

## 2021-03-08 ENCOUNTER — TELEPHONE (OUTPATIENT)
Dept: HEMATOLOGY | Age: 66
End: 2021-03-08

## 2021-03-08 DIAGNOSIS — Z23 ENCOUNTER FOR IMMUNIZATION: Primary | ICD-10-CM

## 2021-03-08 PROCEDURE — 0001A COVID-19, MRNA, LNP-S, PF, 30MCG/0.3ML DOSE(PFIZER): CPT | Performed by: FAMILY MEDICINE

## 2021-03-08 PROCEDURE — 91300 COVID-19, MRNA, LNP-S, PF, 30MCG/0.3ML DOSE(PFIZER): CPT | Performed by: FAMILY MEDICINE

## 2021-03-08 NOTE — TELEPHONE ENCOUNTER
----- Message from Nicolle Mcwilliams MD sent at 3/5/2021  8:04 PM EST -----  Regarding: March 11th  [facility] schedule patient to see Dr Wojciech Haile on Thursday march 11. Thanks,    [email or web address] Patient returned call--she is ok with appt 3/11/21. Also mention to patient to only call office nurse line or send Mychart with all message instead of texting Dr. Silvia Sprague on his personal phone--patient verbalize understanding.

## 2021-03-10 LAB
BASOPHILS # BLD AUTO: 0 X10E3/UL (ref 0–0.2)
BASOPHILS NFR BLD AUTO: 0 %
EOSINOPHIL # BLD AUTO: 0.1 X10E3/UL (ref 0–0.4)
EOSINOPHIL NFR BLD AUTO: 1 %
ERYTHROCYTE [DISTWIDTH] IN BLOOD BY AUTOMATED COUNT: 14 % (ref 11.7–15.4)
HCT VFR BLD AUTO: 44.2 % (ref 34–46.6)
HGB BLD-MCNC: 15.2 G/DL (ref 11.1–15.9)
IMM GRANULOCYTES # BLD AUTO: 0 X10E3/UL (ref 0–0.1)
IMM GRANULOCYTES NFR BLD AUTO: 1 %
LYMPHOCYTES # BLD AUTO: 2.4 X10E3/UL (ref 0.7–3.1)
LYMPHOCYTES NFR BLD AUTO: 30 %
MCH RBC QN AUTO: 31.3 PG (ref 26.6–33)
MCHC RBC AUTO-ENTMCNC: 34.4 G/DL (ref 31.5–35.7)
MCV RBC AUTO: 91 FL (ref 79–97)
MONOCYTES # BLD AUTO: 0.8 X10E3/UL (ref 0.1–0.9)
MONOCYTES NFR BLD AUTO: 10 %
NEUTROPHILS # BLD AUTO: 4.7 X10E3/UL (ref 1.4–7)
NEUTROPHILS NFR BLD AUTO: 58 %
PLATELET # BLD AUTO: 226 X10E3/UL (ref 150–450)
RBC # BLD AUTO: 4.86 X10E6/UL (ref 3.77–5.28)
WBC # BLD AUTO: 8.1 X10E3/UL (ref 3.4–10.8)

## 2021-03-11 ENCOUNTER — OFFICE VISIT (OUTPATIENT)
Dept: HEMATOLOGY | Age: 66
End: 2021-03-11
Payer: MEDICARE

## 2021-03-11 VITALS
DIASTOLIC BLOOD PRESSURE: 79 MMHG | TEMPERATURE: 97.6 F | WEIGHT: 163.6 LBS | SYSTOLIC BLOOD PRESSURE: 145 MMHG | HEART RATE: 76 BPM | BODY MASS INDEX: 27.26 KG/M2 | OXYGEN SATURATION: 99 % | RESPIRATION RATE: 10 BRPM | HEIGHT: 65 IN

## 2021-03-11 DIAGNOSIS — K75.4 AUTOIMMUNE HEPATITIS (HCC): Primary | ICD-10-CM

## 2021-03-11 LAB
ALBUMIN SERPL-MCNC: 4.4 G/DL (ref 3.8–4.8)
ALBUMIN/GLOB SERPL: 2.6 {RATIO} (ref 1.2–2.2)
ALP SERPL-CCNC: 122 IU/L (ref 39–117)
ALT SERPL-CCNC: 48 IU/L (ref 0–32)
AST SERPL-CCNC: 42 IU/L (ref 0–40)
BILIRUB SERPL-MCNC: 0.6 MG/DL (ref 0–1.2)
BUN SERPL-MCNC: 10 MG/DL (ref 8–27)
BUN/CREAT SERPL: 11 (ref 12–28)
CALCIUM SERPL-MCNC: 9 MG/DL (ref 8.7–10.3)
CHLORIDE SERPL-SCNC: 93 MMOL/L (ref 96–106)
CO2 SERPL-SCNC: 27 MMOL/L (ref 20–29)
CREAT SERPL-MCNC: 0.91 MG/DL (ref 0.57–1)
GLOBULIN SER CALC-MCNC: 1.7 G/DL (ref 1.5–4.5)
GLUCOSE SERPL-MCNC: 76 MG/DL (ref 65–99)
POTASSIUM SERPL-SCNC: 4.3 MMOL/L (ref 3.5–5.2)
PROT SERPL-MCNC: 6.1 G/DL (ref 6–8.5)
SODIUM SERPL-SCNC: 135 MMOL/L (ref 134–144)

## 2021-03-11 PROCEDURE — G8536 NO DOC ELDER MAL SCRN: HCPCS | Performed by: INTERNAL MEDICINE

## 2021-03-11 PROCEDURE — G8753 SYS BP > OR = 140: HCPCS | Performed by: INTERNAL MEDICINE

## 2021-03-11 PROCEDURE — G8427 DOCREV CUR MEDS BY ELIG CLIN: HCPCS | Performed by: INTERNAL MEDICINE

## 2021-03-11 PROCEDURE — 1101F PT FALLS ASSESS-DOCD LE1/YR: CPT | Performed by: INTERNAL MEDICINE

## 2021-03-11 PROCEDURE — 1090F PRES/ABSN URINE INCON ASSESS: CPT | Performed by: INTERNAL MEDICINE

## 2021-03-11 PROCEDURE — G8754 DIAS BP LESS 90: HCPCS | Performed by: INTERNAL MEDICINE

## 2021-03-11 PROCEDURE — G9899 SCRN MAM PERF RSLTS DOC: HCPCS | Performed by: INTERNAL MEDICINE

## 2021-03-11 PROCEDURE — 99214 OFFICE O/P EST MOD 30 MIN: CPT | Performed by: INTERNAL MEDICINE

## 2021-03-11 PROCEDURE — G8432 DEP SCR NOT DOC, RNG: HCPCS | Performed by: INTERNAL MEDICINE

## 2021-03-11 PROCEDURE — 3017F COLORECTAL CA SCREEN DOC REV: CPT | Performed by: INTERNAL MEDICINE

## 2021-03-11 PROCEDURE — G0463 HOSPITAL OUTPT CLINIC VISIT: HCPCS | Performed by: INTERNAL MEDICINE

## 2021-03-11 PROCEDURE — G8419 CALC BMI OUT NRM PARAM NOF/U: HCPCS | Performed by: INTERNAL MEDICINE

## 2021-03-11 PROCEDURE — G8400 PT W/DXA NO RESULTS DOC: HCPCS | Performed by: INTERNAL MEDICINE

## 2021-03-11 NOTE — PROGRESS NOTES
[address] [facility]      Chiara Dhaliwal MD, Olga Lidia Hernandes, Gerry Edwards MD, MPH      Sebastian Bolton, PA-DOROTHY Clark, ACNP-BC     April S Humble, [facility]   Linda Lynch, P-C    Renetta Carr, [facility]       Charles Valdez [address]    at [facility]    [address], [address] [place]    [address] [facility] [address]    [phone]    FAX: [address] [place] [address] Drive, [address], [address]    [phone]    FAX: [phone]       Patient Care Team:  Oneida Stephens, Lana Dolan MD as PCP - General (Internal Medicine)  Vanesa Peraza MD as Physician (Obstetrics & Gynecology)  Weber Opitz, MD (Breast Surgery)  Bora Bae MD (Hematology and Oncology)  Edward Wolf MD (Hepatology)      Problem List  Date Reviewed: 1/15/2021          Codes Class Noted    Autoimmune hepatitis [facility]) ICD-10-CM: K75.4  ICD-9-CM: 571.42  11/27/2020        Abnormal liver enzymes ICD-10-CM: R74.8  ICD-9-CM: 790.5  10/29/2020        Hypertension ICD-10-CM: I10  ICD-9-CM: 401.9  Unknown        Fatigue ICD-10-CM: R53.83  ICD-9-CM: 780.79  10/20/2020        Nausea ICD-10-CM: R11.0  ICD-9-CM: 787.02  10/20/2020              Jerry Dolan is being seen at [address] for management of Autoimmune Hepatitis. The active problem list, all pertinent past medical history, medications, liver histology, radiologic findings and laboratory findings related to the liver disorder were reviewed and discussed with the patient. The patient is a 72 y.o.  Black female who was found to have elevated liver enzymes in the 2000 range and jaundice in 10/2020     Serologic evaluation for markers of chronic liver disease was positive for ASMA     A liver biopsy performed 10/2022 demonstrated severe chronic hepatitis with PMN, and stage 3 fibrosis consistent with AIH. She was treated with prednisone and the liver enzymes came down rapidly. Azathioprine was added and the liver enzymes were normal in 1/2021. However, with continued tapering of prednisone below 20 mg the liver enzymes have relapsed. The prednisone dose was increased back to 20 mg every day and the most recent liver enzymes are now coming down again from the 100 the the 40 range. Specific symptoms the patient has today or recently, that are or could be related to the liver disorder and were evaluated or discussed during this visit are as follows:    fatigue, arthralgias, myalgias,   These are better since prednisone was increased back to 20 mg. The patient is not experiencing the following symptoms of liver disease:  yellowing of the eyes or skin, itching, dark urine, problems concentrating, swelling of the abdomen, swelling of the lower extremities,     The patient completes all daily activities without any functional limitations. ASSESSMENT AND PLAN:  Autoimmune hepatitis:   The diagnosis is based on liver biopsy, and serologies that is positive for ASMA    Liver biopsy demonstrates severe aggressive inflammation, severe interface activity, severe PMN, stage 3 bridging  Fibrosis. Given the severity of the inflammation the degree of fibrosis may be overestimated. The Fibroscan can assess liver fibrosis and determine if a patient has advanced fibrosis or cirrhosis without the need for liver biopsy. This should be performed after the patient has had normal liver enzymes for about 6 months so the inflammation does not overestimate the degree of fibrosis. The Fibroscan can be repeated annually or as often as clinically indicated to assess for fibrosis progression and/or regression. At diagnosis the liver enzymes were in the 2000 range.     The patient was treated with prednisone 60 mg every day and azathioprine 50 mg.  The liver enzymes came down nicely. The prednisone dose was tapered per protocol and the liver enzymes were normal in 1/2021. The prednisone dose was tapered below 20 mg and the liver enzymes increased. Azathioprine was increased to 100 mg every day but the liver enzymes continued to increase. The prednisone dose was increased back to 20 mg and the liver enzymes are now down from the 100s to the the 40s    I would keep the prednisone dose at 20 mg until the liver enzymes are normal for at least 3 months and then taper to 15 mg over 3 months, then 10 mg and continue 10 mg for at least 6 months before typing to get off prednisone. She can remain on Azathioproine 100 mg qD. There is no benefit in switching to cellcept at this time. Cellcept is probably not a better second line treatment for AIH and she is tolerating the Azathioprine without side effects. Can reduce follow-up appointments to monthly. Elevation in Ferritin  There is an elevation in ferritin with Elevated iron saturation. HFE genetic testing was negative for any known mutations    The elevation in ferritin reflects hepatic inflammation and will decline back to normal with treatment of AIH. Hepatic lesion:   There is a 1.2 x 0.6 cm mass in the right lobe, segment 5. This has benign features on MRI and may represent a small sclerosed hemangioma. Can repeat imaging with US in 6-40 month to make certain there is no change in size. Then no further follow-up imaging is needed. Screening for Hepatocellular Carcinoma  HCC screening is not necessary if the patient has no evidence of cirrhosis. Imaging shows indeterminate hepatic lesions suggestive of sclerosing hemangioma  AFP was negative.  CA 19-9 was normal.    Treatment of other medical problems in patients with chronic liver disease  There are no contraindications for the patient to take most medications that are necessary for treatment of other medical issues. Counseling for alcohol in patients with chronic liver disease  The patient does not consume any significant amount of alcohol. Vaccinations   Vaccination for viral hepatitis A is not needed. The patient has serologic evidence of prior exposure or vaccination with immunity  Routine vaccinations against other bacterial and viral agents can be performed as indicated. Annual flu vaccination should be administered if indicated. Health maintenance  Variceal surveillance None  CRC screening:  [facility] [address] surveillance: MRI 10/20: Small indeterminate hepatic lesion  Hepatitis A serology: Immune  Hepatitis B serology: Not determined  Influenza: Received 10/2020  Pneumonia: Prevnar followed by Pneumovax after 8 weeks    ALLERGIES  Allergies   Allergen Reactions    Sulfa (Sulfonamide Antibiotics) Unknown (comments)     Pt denies    Iodine Itching       MEDICATIONS  Current Outpatient Medications   Medication Sig    omega-3-dha-epa-fish oil (Omega-3 Fish Oil) 910-1,400 mg cap Take  by mouth.  turmeric/turmeric ext/pepr ext (turmeric-turmeric ext-pepper) 500-3 mg cap Take  by mouth.  predniSONE (DELTASONE) 5 mg tablet Take 1 Tab by mouth daily.  predniSONE (DELTASONE) 10 mg tablet Take 10 mg by mouth daily (with breakfast). (Patient taking differently: Take 20 mg by mouth daily (with breakfast). )    folic acid (FOLVITE) 1 mg tablet     calcium carbonate (CALTREX) 600 mg calcium (1,500 mg) tablet Take 600 mg by mouth two (2) times a day.  cholecalciferol (VITAMIN D3) (2,000 UNITS /50 MCG) cap capsule Take  by mouth two (2) times a day.  multivitamin (ONE A DAY) tablet Take 1 Tab by mouth daily.  predniSONE (DELTASONE) 2.5 mg tablet Take 7 Tabs by mouth daily (with breakfast). Indications: Autoimmune hepatitis    azaTHIOprine (IMURAN) 50 mg tablet Take 1.5 Tabs by mouth daily.  Indications: liver inflammation resulting from an abnormal immune response (Patient taking differently: Take 100 mg by mouth daily. Indications: liver inflammation resulting from an abnormal immune response)    ondansetron hcl (ZOFRAN) 4 mg tablet Take 1 Tab by mouth every eight (8) hours as needed for Nausea or Vomiting.  losartan (COZAAR) 50 mg tablet Take 25 mg by mouth nightly. 1/2 tab = 25 mg     peg 400-propylene glycol (Systane, propylene glycol,) 0.4-0.3 % drop Administer 1 Drop to both eyes as needed. No current facility-administered medications for this visit. SYSTEM REVIEW NOT RELATED TO LIVER DISEASE OR REVIEWED ABOVE:  Constitution systems: Negative for fever, chills, weight gain, weight loss. Eyes: Negative for visual changes. ENT: Negative for sore throat, painful swallowing. Respiratory: Negative for cough, hemoptysis, SOB. Cardiology: Negative for chest pain, palpitations. GI:  Negative for constipation or diarrhea. : Negative for urinary frequency, dysuria, hematuria, nocturia. Skin: Negative for rash. Hematology: Negative for easy bruising, blood clots. Musculo-skelatal: Positive for weakness and joint pains  Neurologic: Negative for headaches, dizziness, vertigo, memory problems not related to HE. Psychology: Negative for anxiety, depression. FAMILY HISTORY:  The father is .  of MI  The mother is .  of recurrent stroke  There is no family history of liver disease. The patient sister has SLE    SOCIAL HISTORY:  The patient is . The patient has 3 children and 9 grand children  The patient has never used tobacco products. The patient consumes alcohol on social occasions never in excess. The patient is retired. 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 Eyes: Sclera anicteric. ENT: No oral lesions. Skin: No rashes. spider angiomata. No jaundice.     Abdomen: No obvious distention suggesting ascites. Extremities: No edema. No muscle wasting. Neurologic: Alert and oriented. Cranial nerves grossly intact. LABORATORY STUDIES:  [facility] [place] of [address] [place] [address] Units 3/10/2021 2/22/2021 2/8/2021   WBC 3.4 - 10.8 x10E3/uL 8.1     ANC 1.4 - 7.0 x10E3/uL 4.7     HGB 11.1 - 15.9 g/dL 15.2      - 450 x10E3/uL 226     INR 0.9 - 1.2      AST 0 - 40 IU/L 42 (H) 103 (H) 73 (H)   ALT 0 - 32 IU/L 48 (H) 114 (H) 69 (H)   Alk Phos 39 - 117 IU/L 122 (H) 168 (H) 69   Bili, Total 0.0 - 1.2 mg/dL 0.6 0.9 0.5   Bili, Direct 0.00 - 0.40 mg/dL  0.47 (H) 0.20   Albumin 3.8 - 4.8 g/dL 4.4 3.8 3.9   BUN 8 - 27 mg/dL 10     Creat 0.57 - 1.00 mg/dL 0.91     Na 134 - 144 mmol/L 135     K 3.5 - 5.2 mmol/L 4.3     Cl 96 - 106 mmol/L 93 (L)     CO2 20 - 29 mmol/L 27     Glucose 65 - 99 mg/dL 76       SEROLOGIES:  Serologies Latest Ref Rng & Units 10/22/2020 10/20/2020   Hep B Surface Ag Index  <0.10   Hep B Surface Ag Interp NEG    Negative   Hep C Ab NR    NONREACTIVE   Ferritin 26 - 388 NG/ML 6,200 (H)    Iron % Saturation 20 - 50 % 93 (H)    ASMCA 0 - 19 Units 25 (H)    M2 Ab 0.0 - 20.0 Units <20.0    Ceruloplasmin 19.0 - 39.0 mg/dL 30.2      Serologies Latest Ref Rng & Units 1/8/2021   Hep A Ab, Total Negative Positive (A)       LIVER HISTOLOGY:  10/2020. Slides reviewed by MLS and NA. Chronic hepatitis. severe portal inflammation, with severe interface hepatitis, with severe PMN. severe lobular inflammation. NO steatosis. Sharmin stage 4 fibrosis. Metavir stage 3 fibrosis. Knodell score (4203). ENDOSCOPIC PROCEDURES:  Not available or performed    RADIOLOGY:  10/2020: MRI/MRCP liver: . Small indeterminate hepatic lesion is of questionable significance. A  sclerosing hemangioma is favored.   10/2020: CT scan WO Liver: Periportal edema vs intrahepatic biliary ductal dilation    OTHER TESTING:  Not available or performed      FOLLOW-UP:  All of the issues listed above in the Assessment and Plan were discussed with the patient. All questions were answered. The patient expressed a clear understanding of the above. [address] [place] [address] in 4 weeks to review all data and determine the treatment plan.       Dominic Fisher MD  [address]  [phone]  [address] [place] [address]

## 2021-03-11 NOTE — PROGRESS NOTES
Identified pt with two pt identifiers(name and ). Reviewed record in preparation for visit and have obtained necessary documentation.  Chief Complaint   Patient presents with   • Other     Autoimmune Hepatitis      Vitals:    21 0844   BP: (!) 145/79   Pulse: 76   Resp: 10   Temp: 97.6 °F (36.4 °C)   TempSrc: Temporal   SpO2: 99%   Weight: 163 lb 9.6 oz (74.2 kg)   Height: 5' 5\" (1.651 m)   PainSc:   0 - No pain       Health Maintenance Review: Patient reminded of \"due or due soon\" health maintenance. I have asked the patient to contact his/her primary care provider (PCP) for follow-up on his/her health maintenance.    Coordination of Care Questionnaire:  :   1) Have you been to an emergency room, urgent care, or hospitalized since your last visit?  If yes, where when, and reason for visit? no       2. Have seen or consulted any other health care provider since your last visit?   If yes, where when, and reason for visit?  NO      Patient is accompanied by self I have received verbal consent from Orcile I Dolan to discuss any/all medical information while they are present in the room.

## 2021-03-11 NOTE — Clinical Note
3/20/2021 Patient: Amy Hernandez YOB: 1955 Date of Visit: 3/11/2021 [address], [place] 
[address] [address] [address] Fax: [phone] [address] Nancy Atkinson MD, Thank you for referring Ms. Jairon Dolan to [address] for evaluation. My notes for this consultation are attached. If you have questions, please do not hesitate to call me. I look forward to following your patient along with you. Sincerely, Michaela Barnard MD

## 2021-03-20 PROBLEM — R53.83 FATIGUE: Status: RESOLVED | Noted: 2020-10-20 | Resolved: 2021-03-20

## 2021-03-20 PROBLEM — R74.8 ABNORMAL LIVER ENZYMES: Status: RESOLVED | Noted: 2020-10-29 | Resolved: 2021-03-20

## 2021-03-20 PROBLEM — R11.0 NAUSEA: Status: RESOLVED | Noted: 2020-10-20 | Resolved: 2021-03-20

## 2021-03-20 RX ORDER — AZATHIOPRINE 50 MG/1
100 TABLET ORAL DAILY
Qty: 30 TAB | Refills: 0
Start: 2021-03-20 | End: 2022-01-25

## 2021-03-20 RX ORDER — PREDNISONE 10 MG/1
20 TABLET ORAL
Qty: 60 TAB | Refills: 0
Start: 2021-03-20 | End: 2021-04-20 | Stop reason: SDUPTHER

## 2021-04-20 ENCOUNTER — OFFICE VISIT (OUTPATIENT)
Dept: HEMATOLOGY | Age: 66
End: 2021-04-20
Payer: MEDICARE

## 2021-04-20 VITALS
HEART RATE: 75 BPM | SYSTOLIC BLOOD PRESSURE: 135 MMHG | RESPIRATION RATE: 20 BRPM | DIASTOLIC BLOOD PRESSURE: 86 MMHG | HEIGHT: 65 IN | TEMPERATURE: 97.9 F | WEIGHT: 160.4 LBS | BODY MASS INDEX: 26.73 KG/M2

## 2021-04-20 DIAGNOSIS — K75.4 AUTOIMMUNE HEPATITIS (HCC): Primary | ICD-10-CM

## 2021-04-20 LAB
ALBUMIN SERPL-MCNC: 3.9 G/DL (ref 3.8–4.8)
ALBUMIN/GLOB SERPL: 1.8 {RATIO} (ref 1.2–2.2)
ALP SERPL-CCNC: 52 IU/L (ref 39–117)
ALT SERPL-CCNC: 20 IU/L (ref 0–32)
AST SERPL-CCNC: 28 IU/L (ref 0–40)
BILIRUB SERPL-MCNC: 0.6 MG/DL (ref 0–1.2)
BUN SERPL-MCNC: 10 MG/DL (ref 8–27)
BUN/CREAT SERPL: 11 (ref 12–28)
CALCIUM SERPL-MCNC: 9.4 MG/DL (ref 8.7–10.3)
CHLORIDE SERPL-SCNC: 94 MMOL/L (ref 96–106)
CO2 SERPL-SCNC: 26 MMOL/L (ref 20–29)
CREAT SERPL-MCNC: 0.91 MG/DL (ref 0.57–1)
GLOBULIN SER CALC-MCNC: 2.2 G/DL (ref 1.5–4.5)
GLUCOSE SERPL-MCNC: 62 MG/DL (ref 65–99)
POTASSIUM SERPL-SCNC: 4.4 MMOL/L (ref 3.5–5.2)
PROT SERPL-MCNC: 6.1 G/DL (ref 6–8.5)
SODIUM SERPL-SCNC: 135 MMOL/L (ref 134–144)

## 2021-04-20 PROCEDURE — 3017F COLORECTAL CA SCREEN DOC REV: CPT | Performed by: HOSPITALIST

## 2021-04-20 PROCEDURE — G9899 SCRN MAM PERF RSLTS DOC: HCPCS | Performed by: HOSPITALIST

## 2021-04-20 PROCEDURE — G8419 CALC BMI OUT NRM PARAM NOF/U: HCPCS | Performed by: HOSPITALIST

## 2021-04-20 PROCEDURE — G8752 SYS BP LESS 140: HCPCS | Performed by: HOSPITALIST

## 2021-04-20 PROCEDURE — G8432 DEP SCR NOT DOC, RNG: HCPCS | Performed by: HOSPITALIST

## 2021-04-20 PROCEDURE — G0463 HOSPITAL OUTPT CLINIC VISIT: HCPCS | Performed by: HOSPITALIST

## 2021-04-20 PROCEDURE — G8754 DIAS BP LESS 90: HCPCS | Performed by: HOSPITALIST

## 2021-04-20 PROCEDURE — G8427 DOCREV CUR MEDS BY ELIG CLIN: HCPCS | Performed by: HOSPITALIST

## 2021-04-20 PROCEDURE — G8536 NO DOC ELDER MAL SCRN: HCPCS | Performed by: HOSPITALIST

## 2021-04-20 PROCEDURE — G8400 PT W/DXA NO RESULTS DOC: HCPCS | Performed by: HOSPITALIST

## 2021-04-20 PROCEDURE — 1090F PRES/ABSN URINE INCON ASSESS: CPT | Performed by: HOSPITALIST

## 2021-04-20 PROCEDURE — 99214 OFFICE O/P EST MOD 30 MIN: CPT | Performed by: HOSPITALIST

## 2021-04-20 PROCEDURE — 1101F PT FALLS ASSESS-DOCD LE1/YR: CPT | Performed by: HOSPITALIST

## 2021-04-20 RX ORDER — PREDNISONE 10 MG/1
20 TABLET ORAL
Qty: 60 TAB | Refills: 3 | Status: SHIPPED
Start: 2021-04-20 | End: 2021-05-21 | Stop reason: DRUGHIGH

## 2021-04-20 NOTE — LETTER
4/20/2021 Patient: Zay Dejesus YOB: 1955 Date of Visit: 4/20/2021 [address], [place] 
[address] [address] [address] Fax: [phone] [address] Nancy Atkinson MD, Thank you for referring Ms. Jairon Dolan to [address] for evaluation. My notes for this consultation are attached. If you have questions, please do not hesitate to call me. I look forward to following your patient along with you. Sincerely, Kathe Gillis MD

## 2021-04-20 NOTE — PATIENT INSTRUCTIONS
We will repeat blood work in 1 month and if normal liver enzymes will begin steroid taper 2.5 mg every 2 weeks to 15 mg  For 3 months then taper to 10 mg for another 6 months

## 2021-04-20 NOTE — PROGRESS NOTES
[address] [facility], MD, Mohinder Hernandez MD, MPH      SOLE [facility]     Anisa Kate, [facility]   Dash Moeller QU[facility]       Charles De Leon [address]    at [address], [facility] Yessi Cannon  22.    [phone]    FAX: [address] [place] [address], [address]    [phone]    FAX: [phone]       Patient Care Team:  Ramu Bear MD as PCP - General (Internal Medicine)  Quynh Loera MD as Physician (Obstetrics & Gynecology)  Mayte Osuna MD (Breast Surgery)  Carmine Milian MD (Hematology and Oncology)  Latesha Arnett MD (Hepatology)      Problem List  Date Reviewed: 3/20/2021          Codes Class Noted    Autoimmune hepatitis [facility]) ICD-10-CM: K75.4  ICD-9-CM: 571.42  11/27/2020        Hypertension ICD-10-CM: T93  ICD-9-CM: 401.9  Unknown              Fan Hancock is being seen at [address] for management of Autoimmune Hepatitis. The active problem list, all pertinent past medical history, medications, liver histology, radiologic findings and laboratory findings related to the liver disorder were reviewed and discussed with the patient. The patient is a 72 y.o. Black female who was found to have elevated liver enzymes in the 2000 range and jaundice in 10/2020     Serologic evaluation for markers of chronic liver disease was positive for ASMA     A liver biopsy performed 10/2022 demonstrated severe chronic hepatitis with PMN, and stage 3 fibrosis consistent with AIH. She was treated with prednisone and the liver enzymes came down rapidly.   Azathioprine was added and the liver enzymes were normal in 1/2021. However, with continued tapering of prednisone below 20 mg the liver enzymes have relapsed. The prednisone dose was increased back to 20 mg every day and the most recent liver enzymes are now coming down again from the 100 the the 40 range. Specific symptoms the patient has today or recently, that are or could be related to the liver disorder and were evaluated or discussed during this visit are as follows:    fatigue, arthralgias, myalgias,   These are better since prednisone was increased back to 20 mg. The patient is not experiencing the following symptoms of liver disease:  yellowing of the eyes or skin, itching, dark urine, problems concentrating, swelling of the abdomen, swelling of the lower extremities,     The patient completes all daily activities without any functional limitations. Since last clinic visit  She has done well. She had a fall in a time share and bruise right upper arm  She also bruises easily. She is on prednisone 20 mg daily and imuran 75 mg daily  Liver enzymes performed yesterday was completely normal      ASSESSMENT AND PLAN:  Autoimmune hepatitis:   The diagnosis is based on liver biopsy, and serologies that is positive for ASMA    Liver biopsy demonstrates severe aggressive inflammation, severe interface activity, severe PMN, stage 3 bridging  Fibrosis. Given the severity of the inflammation the degree of fibrosis may be overestimated. The Fibroscan can assess liver fibrosis and determine if a patient has advanced fibrosis or cirrhosis without the need for liver biopsy. This should be performed after the patient has had normal liver enzymes for about 6 months so the inflammation does not overestimate the degree of fibrosis. The Fibroscan can be repeated annually or as often as clinically indicated to assess for fibrosis progression and/or regression. At diagnosis the liver enzymes were in the 2000 range.     The patient was treated with prednisone 60 mg every day and azathioprine 50 mg. The liver enzymes came down nicely. The prednisone dose was tapered per protocol and the liver enzymes were normal in 1/2021. The prednisone dose was tapered below 20 mg and the liver enzymes increased. Azathioprine was increased to 100 mg every day but the liver enzymes continued to increase. The prednisone dose was increased back to 20 mg and the liver enzymes are now down from the 100s to the the 40s    We will continue prednisone 20 mg daily and Imuran 75 mg daily for a month  We will begin prednisone taper by 2.5 mg every 2 weeks then 15 mg for 3 months, then 10 mg for at least 6 months before we begin final taper      There is no benefit in switching to cellcept at this time. Cellcept is probably not a better second line treatment for AIH and she is tolerating the Azathioprine without side effects. Elevation in Ferritin  There is an elevation in ferritin with Elevated iron saturation. HFE genetic testing was negative for any known mutations    The elevation in ferritin reflects hepatic inflammation and will decline back to normal with treatment of AIH. Hepatic lesion:   There is a 1.2 x 0.6 cm mass in the right lobe, segment 5. This has benign features on MRI and may represent a small sclerosed hemangioma. Can repeat imaging with US in 6-43 month to make certain there is no change in size. We will perform ultrasound of the liver next clinic visit    Screening for Hepatocellular Carcinoma  [address] screening is not necessary if the patient has no evidence of cirrhosis. Imaging shows indeterminate hepatic lesions suggestive of sclerosing hemangioma  AFP was negative. CA 19-9 was normal.    Treatment of other medical problems in patients with chronic liver disease  There are no contraindications for the patient to take most medications that are necessary for treatment of other medical issues.     Counseling for alcohol in patients with chronic liver disease  The patient does not consume any significant amount of alcohol. Vaccinations   Vaccination for viral hepatitis A is not needed. The patient has serologic evidence of prior exposure or vaccination with immunity  Routine vaccinations against other bacterial and viral agents can be performed as indicated. Annual flu vaccination should be administered if indicated. Health maintenance  Variceal surveillance None  CRC screening:  [facility] [address] surveillance: MRI 10/20: Small indeterminate hepatic lesion  Hepatitis A serology: Immune  Hepatitis B serology: Not determined  Influenza: Received 10/2020  Pneumonia: Prevnar followed by Pneumovax after 8 weeks    ALLERGIES  Allergies   Allergen Reactions    Sulfa (Sulfonamide Antibiotics) Unknown (comments)     Pt denies    Iodine Itching       MEDICATIONS  Current Outpatient Medications   Medication Sig    L.acid/L.casei/B.bif/B.deyanira/FOS (PROBIOTIC BLEND PO) Take  by mouth.  azaTHIOprine (IMURAN) 50 mg tablet Take 2 Tabs by mouth daily. Indications: liver inflammation resulting from an abnormal immune response    predniSONE (DELTASONE) 10 mg tablet Take 20 mg by mouth daily (with breakfast).  omega-3-dha-epa-fish oil (Omega-3 Fish Oil) 910-1,400 mg cap Take  by mouth.  turmeric/turmeric ext/pepr ext (turmeric-turmeric ext-pepper) 500-3 mg cap Take  by mouth.  folic acid (FOLVITE) 1 mg tablet     calcium carbonate (CALTREX) 600 mg calcium (1,500 mg) tablet Take 600 mg by mouth two (2) times a day.  cholecalciferol (VITAMIN D3) (2,000 UNITS /50 MCG) cap capsule Take  by mouth two (2) times a day.  multivitamin (ONE A DAY) tablet Take 1 Tab by mouth daily.  losartan (COZAAR) 50 mg tablet Take 25 mg by mouth nightly. 1/2 tab = 25 mg     peg 400-propylene glycol (Systane, propylene glycol,) 0.4-0.3 % drop Administer 1 Drop to both eyes as needed. No current facility-administered medications for this visit.         SYSTEM REVIEW NOT RELATED TO LIVER DISEASE OR REVIEWED ABOVE:  Constitution systems: Negative for fever, chills, weight gain, weight loss. Eyes: Negative for visual changes. ENT: Negative for sore throat, painful swallowing. Respiratory: Negative for cough, hemoptysis, SOB. Cardiology: Negative for chest pain, palpitations. GI:  Negative for constipation or diarrhea. : Negative for urinary frequency, dysuria, hematuria, nocturia. Skin: Negative for rash. Hematology: Negative for easy bruising, blood clots. Musculo-skelatal: Positive for weakness and joint pains  Neurologic: Negative for headaches, dizziness, vertigo, memory problems not related to HE. Psychology: Negative for anxiety, depression. FAMILY HISTORY:  The father is .  of MI  The mother is .  of recurrent stroke  There is no family history of liver disease. The patient sister has SLE    SOCIAL HISTORY:  The patient is . The patient has 3 children and 9 grand children  The patient has never used tobacco products. The patient consumes alcohol on social occasions never in excess. The patient is retired. 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 Eyes: Sclera anicteric. ENT: No oral lesions. Skin: No rashes. spider angiomata. No jaundice. Abdomen: No obvious distention suggesting ascites. Extremities: No edema. No muscle wasting. Neurologic: Alert and oriented. Cranial nerves grossly intact.       LABORATORY STUDIES:  Liver [place] of [address] Units 2021 3/10/2021   WBC 3.4 - 10.8 x10E3/uL  8.1   ANC 1.4 - 7.0 x10E3/uL  4.7   HGB 11.1 - 15.9 g/dL  15.2    - 450 x10E3/uL  226   INR 0.9 - 1.2     AST 0 - 40 IU/L 28 42 (H)   ALT 0 - 32 IU/L 20 48 (H)   Alk Phos 39 - 117 IU/L 52 122 (H)   Bili, Total 0.0 - 1.2 mg/dL 0.6 0.6   Bili, Direct 0.00 - 0.40 mg/dL     Albumin 3.8 - 4.8 g/dL 3.9 4.4   BUN 8 - 27 mg/dL 10 10   Creat 0.57 - 1.00 mg/dL 0.91 0.91   Na 134 - 144 mmol/L 135 135   K 3.5 - 5.2 mmol/L 4.4 4.3   Cl 96 - 106 mmol/L 94 (L) 93 (L)   CO2 20 - 29 mmol/L 26 27   Glucose 65 - 99 mg/dL 62 (L) 76   Magnesium 1.6 - 2.4 mg/dL       Liver [place] [address] Ref Rng & Units 2/22/2021   WBC 3.4 - 10.8 x10E3/uL    ANC 1.4 - 7.0 x10E3/uL    HGB 11.1 - 15.9 g/dL     - 450 x10E3/uL    INR 0.9 - 1.2    AST 0 - 40 IU/L 103 (H)   ALT 0 - 32 IU/L 114 (H)   Alk Phos 39 - 117 IU/L 168 (H)   Bili, Total 0.0 - 1.2 mg/dL 0.9   Bili, Direct 0.00 - 0.40 mg/dL 0.47 (H)   Albumin 3.8 - 4.8 g/dL 3.8   BUN 8 - 27 mg/dL    Creat 0.57 - 1.00 mg/dL    Na 134 - 144 mmol/L    K 3.5 - 5.2 mmol/L    Cl 96 - 106 mmol/L    CO2 20 - 29 mmol/L    Glucose 65 - 99 mg/dL    Magnesium 1.6 - 2.4 mg/dL      SEROLOGIES:  Serologies Latest Ref Rng & Units 10/22/2020 10/20/2020   Hep B Surface Ag Index  <0.10   Hep B Surface Ag Interp NEG    Negative   Hep C Ab NR    NONREACTIVE   Ferritin 26 - 388 NG/ML 6,200 (H)    Iron % Saturation 20 - 50 % 93 (H)    ASMCA 0 - 19 Units 25 (H)    M2 Ab 0.0 - 20.0 Units <20.0    Ceruloplasmin 19.0 - 39.0 mg/dL 30.2      Serologies Latest Ref Rng & Units 1/8/2021   Hep A Ab, Total Negative Positive (A)       LIVER HISTOLOGY:  10/2020. Slides reviewed by MLS and NA. Chronic hepatitis. severe portal inflammation, with severe interface hepatitis, with severe PMN. severe lobular inflammation. NO steatosis. Sharmin stage 4 fibrosis. Metavir stage 3 fibrosis. Knodell score (4000). ENDOSCOPIC PROCEDURES:  Not available or performed    RADIOLOGY:  10/2020: MRI/MRCP liver: . Small indeterminate hepatic lesion is of questionable significance. A  sclerosing hemangioma is favored.   10/2020: CT scan WO Liver: Periportal edema vs intrahepatic biliary ductal dilation    OTHER TESTING:  Not available or performed      FOLLOW-UP:  All of the issues listed above in the Assessment and Plan were discussed with the patient. All questions were answered. The patient expressed a clear understanding of the above. [address] [place] [address] in 3 months to review all data and determine the treatment plan.       Fermin Angel MD, MPH  Advanced Hepatology  [place] [address] of [address] [facility] [address] [facility], [address] [place] [address] 22.  [phone]  [address]

## 2021-04-20 NOTE — PROGRESS NOTES
Identified pt with two pt identifiers(name and ). Reviewed record in preparation for visit and have obtained necessary documentation. Chief Complaint   Patient presents with    Other     Autoimmune Hepatitis      Vitals:    21 1420   BP: 135/86   Pulse: 75   Resp: 20   Temp: 97.9 °F (36.6 °C)   TempSrc: Temporal   Weight: 160 lb 6.4 oz (72.8 kg)   Height: 5' 5\" (1.651 m)   PainSc:   0 - No pain       Health Maintenance Review: Patient reminded of \"due or due soon\" health maintenance. I have asked the patient to contact his/her primary care provider (PCP) for follow-up on his/her health maintenance. Coordination of Care Questionnaire:  :   1) Have you been to an emergency room, urgent care, or hospitalized since your last visit? If yes, where when, and reason for visit? no       2. Have seen or consulted any other health care provider since your last visit? If yes, where when, and reason for visit? NO      Patient is accompanied by  I have received verbal consent from Chilo Ansari to discuss any/all medical information while they are present in the room.

## 2021-05-18 LAB
BASOPHILS # BLD AUTO: 0 X10E3/UL (ref 0–0.2)
BASOPHILS NFR BLD AUTO: 0 %
EOSINOPHIL # BLD AUTO: 0 X10E3/UL (ref 0–0.4)
EOSINOPHIL NFR BLD AUTO: 1 %
ERYTHROCYTE [DISTWIDTH] IN BLOOD BY AUTOMATED COUNT: 14.3 % (ref 11.7–15.4)
HCT VFR BLD AUTO: 43.1 % (ref 34–46.6)
HGB BLD-MCNC: 14.2 G/DL (ref 11.1–15.9)
IMM GRANULOCYTES # BLD AUTO: 0 X10E3/UL (ref 0–0.1)
IMM GRANULOCYTES NFR BLD AUTO: 0 %
LYMPHOCYTES # BLD AUTO: 2.4 X10E3/UL (ref 0.7–3.1)
LYMPHOCYTES NFR BLD AUTO: 34 %
MCH RBC QN AUTO: 30 PG (ref 26.6–33)
MCHC RBC AUTO-ENTMCNC: 32.9 G/DL (ref 31.5–35.7)
MCV RBC AUTO: 91 FL (ref 79–97)
MONOCYTES # BLD AUTO: 0.7 X10E3/UL (ref 0.1–0.9)
MONOCYTES NFR BLD AUTO: 11 %
NEUTROPHILS # BLD AUTO: 3.8 X10E3/UL (ref 1.4–7)
NEUTROPHILS NFR BLD AUTO: 54 %
PLATELET # BLD AUTO: 187 X10E3/UL (ref 150–450)
RBC # BLD AUTO: 4.74 X10E6/UL (ref 3.77–5.28)
WBC # BLD AUTO: 7 X10E3/UL (ref 3.4–10.8)

## 2021-05-19 LAB
ALBUMIN SERPL-MCNC: 4.1 G/DL (ref 3.8–4.8)
ALBUMIN/GLOB SERPL: 2.3 {RATIO} (ref 1.2–2.2)
ALP SERPL-CCNC: 39 IU/L (ref 48–121)
ALT SERPL-CCNC: 16 IU/L (ref 0–32)
AST SERPL-CCNC: 22 IU/L (ref 0–40)
BILIRUB SERPL-MCNC: 0.5 MG/DL (ref 0–1.2)
BUN SERPL-MCNC: 14 MG/DL (ref 8–27)
BUN/CREAT SERPL: 16 (ref 12–28)
CALCIUM SERPL-MCNC: 9.4 MG/DL (ref 8.7–10.3)
CHLORIDE SERPL-SCNC: 91 MMOL/L (ref 96–106)
CO2 SERPL-SCNC: 30 MMOL/L (ref 20–29)
CREAT SERPL-MCNC: 0.9 MG/DL (ref 0.57–1)
GLOBULIN SER CALC-MCNC: 1.8 G/DL (ref 1.5–4.5)
GLUCOSE SERPL-MCNC: 76 MG/DL (ref 65–99)
POTASSIUM SERPL-SCNC: 4.6 MMOL/L (ref 3.5–5.2)
PROT SERPL-MCNC: 5.9 G/DL (ref 6–8.5)
SODIUM SERPL-SCNC: 137 MMOL/L (ref 134–144)

## 2021-05-20 ENCOUNTER — PATIENT MESSAGE (OUTPATIENT)
Dept: HEMATOLOGY | Age: 66
End: 2021-05-20

## 2021-05-20 DIAGNOSIS — K75.4 AUTOIMMUNE HEPATITIS (HCC): Primary | ICD-10-CM

## 2021-05-20 NOTE — TELEPHONE ENCOUNTER
[email or web address] Printed encounter off in will give to Dr. Jose Vaca once he returns to the office. (KF)

## 2021-05-21 ENCOUNTER — TELEPHONE (OUTPATIENT)
Dept: HEMATOLOGY | Age: 66
End: 2021-05-21

## 2021-05-21 DIAGNOSIS — K75.4 AUTOIMMUNE HEPATITIS (HCC): Primary | ICD-10-CM

## 2021-05-21 RX ORDER — PREDNISONE 2.5 MG/1
2.5 TABLET ORAL DAILY
Qty: 60 TABLET | Refills: 0 | Status: SHIPPED | OUTPATIENT
Start: 2021-05-21 | End: 2021-09-21 | Stop reason: ALTCHOICE

## 2021-05-21 NOTE — TELEPHONE ENCOUNTER
[email or web address] Discuss w/ concerning patient MyChart message on 5/20/21. Verbal order rec'd to refill medication and order labs as patient request. Called patient to make aware medication was sent to local pharmacy and labs will be placed in the mail. Dr. Ritter Leader will be calling patient also--verbalize understanding.  (KF)

## 2021-06-15 LAB
BASOPHILS # BLD AUTO: 0 X10E3/UL (ref 0–0.2)
BASOPHILS NFR BLD AUTO: 0 %
EOSINOPHIL # BLD AUTO: 0 X10E3/UL (ref 0–0.4)
EOSINOPHIL NFR BLD AUTO: 1 %
ERYTHROCYTE [DISTWIDTH] IN BLOOD BY AUTOMATED COUNT: 14.3 % (ref 11.7–15.4)
HCT VFR BLD AUTO: 42.1 % (ref 34–46.6)
HGB BLD-MCNC: 14.1 G/DL (ref 11.1–15.9)
IMM GRANULOCYTES # BLD AUTO: 0 X10E3/UL (ref 0–0.1)
IMM GRANULOCYTES NFR BLD AUTO: 1 %
LYMPHOCYTES # BLD AUTO: 2.6 X10E3/UL (ref 0.7–3.1)
LYMPHOCYTES NFR BLD AUTO: 44 %
MCH RBC QN AUTO: 30.3 PG (ref 26.6–33)
MCHC RBC AUTO-ENTMCNC: 33.5 G/DL (ref 31.5–35.7)
MCV RBC AUTO: 91 FL (ref 79–97)
MONOCYTES # BLD AUTO: 0.7 X10E3/UL (ref 0.1–0.9)
MONOCYTES NFR BLD AUTO: 12 %
NEUTROPHILS # BLD AUTO: 2.4 X10E3/UL (ref 1.4–7)
NEUTROPHILS NFR BLD AUTO: 42 %
PLATELET # BLD AUTO: 204 X10E3/UL (ref 150–450)
RBC # BLD AUTO: 4.65 X10E6/UL (ref 3.77–5.28)
WBC # BLD AUTO: 5.7 X10E3/UL (ref 3.4–10.8)

## 2021-06-16 LAB
ALBUMIN SERPL-MCNC: 4.2 G/DL (ref 3.8–4.8)
ALBUMIN/GLOB SERPL: 2.5 {RATIO} (ref 1.2–2.2)
ALP SERPL-CCNC: 35 IU/L (ref 48–121)
ALT SERPL-CCNC: 20 IU/L (ref 0–32)
AST SERPL-CCNC: 25 IU/L (ref 0–40)
BILIRUB SERPL-MCNC: 0.5 MG/DL (ref 0–1.2)
BUN SERPL-MCNC: 12 MG/DL (ref 8–27)
BUN/CREAT SERPL: 14 (ref 12–28)
CALCIUM SERPL-MCNC: 9.1 MG/DL (ref 8.7–10.3)
CHLORIDE SERPL-SCNC: 94 MMOL/L (ref 96–106)
CO2 SERPL-SCNC: 28 MMOL/L (ref 20–29)
CREAT SERPL-MCNC: 0.88 MG/DL (ref 0.57–1)
GLOBULIN SER CALC-MCNC: 1.7 G/DL (ref 1.5–4.5)
GLUCOSE SERPL-MCNC: 74 MG/DL (ref 65–99)
POTASSIUM SERPL-SCNC: 4.5 MMOL/L (ref 3.5–5.2)
PROT SERPL-MCNC: 5.9 G/DL (ref 6–8.5)
SODIUM SERPL-SCNC: 135 MMOL/L (ref 134–144)

## 2021-07-07 ENCOUNTER — TELEPHONE (OUTPATIENT)
Dept: HEMATOLOGY | Age: 66
End: 2021-07-07

## 2021-07-07 NOTE — TELEPHONE ENCOUNTER
[email or web address] return call to patient concerning advise on decreasing prednisone dose.  Per  patient need to stay on same does until next office appt on 7/26/21 then they can discuss tapering dose--pt verbalize understanding after talking with MD.(KF)

## 2021-07-21 LAB
BASOPHILS # BLD AUTO: 0 X10E3/UL (ref 0–0.2)
BASOPHILS NFR BLD AUTO: 0 %
EOSINOPHIL # BLD AUTO: 0 X10E3/UL (ref 0–0.4)
EOSINOPHIL NFR BLD AUTO: 1 %
ERYTHROCYTE [DISTWIDTH] IN BLOOD BY AUTOMATED COUNT: 14 % (ref 11.7–15.4)
HCT VFR BLD AUTO: 38.5 % (ref 34–46.6)
HGB BLD-MCNC: 13.7 G/DL (ref 11.1–15.9)
IMM GRANULOCYTES # BLD AUTO: 0 X10E3/UL (ref 0–0.1)
IMM GRANULOCYTES NFR BLD AUTO: 0 %
LYMPHOCYTES # BLD AUTO: 2.6 X10E3/UL (ref 0.7–3.1)
LYMPHOCYTES NFR BLD AUTO: 45 %
MCH RBC QN AUTO: 32 PG (ref 26.6–33)
MCHC RBC AUTO-ENTMCNC: 35.6 G/DL (ref 31.5–35.7)
MCV RBC AUTO: 90 FL (ref 79–97)
MONOCYTES # BLD AUTO: 0.6 X10E3/UL (ref 0.1–0.9)
MONOCYTES NFR BLD AUTO: 11 %
NEUTROPHILS # BLD AUTO: 2.4 X10E3/UL (ref 1.4–7)
NEUTROPHILS NFR BLD AUTO: 43 %
PLATELET # BLD AUTO: 213 X10E3/UL (ref 150–450)
RBC # BLD AUTO: 4.28 X10E6/UL (ref 3.77–5.28)
WBC # BLD AUTO: 5.7 X10E3/UL (ref 3.4–10.8)

## 2021-07-23 LAB
ALBUMIN SERPL-MCNC: 4 G/DL (ref 3.8–4.8)
ALBUMIN/GLOB SERPL: 2 {RATIO} (ref 1.2–2.2)
ALP SERPL-CCNC: 30 IU/L (ref 48–121)
ALT SERPL-CCNC: 15 IU/L (ref 0–32)
AST SERPL-CCNC: 24 IU/L (ref 0–40)
BILIRUB SERPL-MCNC: 0.3 MG/DL (ref 0–1.2)
BUN SERPL-MCNC: 8 MG/DL (ref 8–27)
BUN/CREAT SERPL: 10 (ref 12–28)
CALCIUM SERPL-MCNC: 9.1 MG/DL (ref 8.7–10.3)
CHLORIDE SERPL-SCNC: 95 MMOL/L (ref 96–106)
CO2 SERPL-SCNC: 27 MMOL/L (ref 20–29)
CREAT SERPL-MCNC: 0.79 MG/DL (ref 0.57–1)
GLOBULIN SER CALC-MCNC: 2 G/DL (ref 1.5–4.5)
GLUCOSE SERPL-MCNC: 79 MG/DL (ref 65–99)
POTASSIUM SERPL-SCNC: 4.1 MMOL/L (ref 3.5–5.2)
PROT SERPL-MCNC: 6 G/DL (ref 6–8.5)
SODIUM SERPL-SCNC: 136 MMOL/L (ref 134–144)

## 2021-07-26 ENCOUNTER — VIRTUAL VISIT (OUTPATIENT)
Dept: HEMATOLOGY | Age: 66
End: 2021-07-26
Payer: MEDICARE

## 2021-07-26 DIAGNOSIS — K75.4 AUTOIMMUNE HEPATITIS (HCC): Primary | ICD-10-CM

## 2021-07-26 PROCEDURE — G8400 PT W/DXA NO RESULTS DOC: HCPCS | Performed by: HOSPITALIST

## 2021-07-26 PROCEDURE — G8427 DOCREV CUR MEDS BY ELIG CLIN: HCPCS | Performed by: HOSPITALIST

## 2021-07-26 PROCEDURE — 1101F PT FALLS ASSESS-DOCD LE1/YR: CPT | Performed by: HOSPITALIST

## 2021-07-26 PROCEDURE — G0463 HOSPITAL OUTPT CLINIC VISIT: HCPCS | Performed by: HOSPITALIST

## 2021-07-26 PROCEDURE — 1090F PRES/ABSN URINE INCON ASSESS: CPT | Performed by: HOSPITALIST

## 2021-07-26 PROCEDURE — G9899 SCRN MAM PERF RSLTS DOC: HCPCS | Performed by: HOSPITALIST

## 2021-07-26 PROCEDURE — G8756 NO BP MEASURE DOC: HCPCS | Performed by: HOSPITALIST

## 2021-07-26 PROCEDURE — G8432 DEP SCR NOT DOC, RNG: HCPCS | Performed by: HOSPITALIST

## 2021-07-26 PROCEDURE — 99214 OFFICE O/P EST MOD 30 MIN: CPT | Performed by: HOSPITALIST

## 2021-07-26 PROCEDURE — 3017F COLORECTAL CA SCREEN DOC REV: CPT | Performed by: HOSPITALIST

## 2021-07-26 NOTE — PROGRESS NOTES
[address] \[facility] [place] [facility]\"", MD, Juli Kerns MD, MPH      SOLE [place], [facility]     Anisa Kate, [facility]   Radha Mcknight [facility]    Rajan Case, [facility]       Charles Valdez [facility] 136    at [address], [facility] Yessi Cannon  22.    [phone]    FAX: [address] [place] [address], [address]    [phone]    FAX: [phone]       Patient Care Team:  Teagan Trevizo MD as PCP - General (Internal Medicine)  Shani Hay MD as Physician (Obstetrics & Gynecology)  Radha Denise MD (Breast Surgery)  Gisella Braden MD (Hematology and Oncology)  Shannan Buck MD (Hepatology)      Problem List  Date Reviewed: 4/20/2021        Codes Class Noted    Autoimmune hepatitis [facility]) ICD-10-CM: K75.4  ICD-9-CM: 571.42  11/27/2020        Hypertension ICD-10-CM: A71  ICD-9-CM: 401.9  Unknown              Claudia Neely is being seen at [facility] [place] for management of Autoimmune Hepatitis. The active problem list, all pertinent past medical history, medications, liver histology, radiologic findings and laboratory findings related to the liver disorder were reviewed and discussed with the patient. The visit was performed via telephone     The patient is a 77 y.o. Black female who was found to have elevated liver enzymes in the 2000 range and jaundice in 10/2020     Serologic evaluation for markers of chronic liver disease was positive for ASMA     A liver biopsy performed 10/2022 demonstrated severe chronic hepatitis with PMN, and stage 3 fibrosis consistent with AIH. She was treated with prednisone and the liver enzymes came down rapidly.   Azathioprine was added and the liver enzymes were normal in 1/2021. However, with continued tapering of prednisone below 20 mg the liver enzymes have relapsed. The prednisone dose was increased back to 20 mg every day and the most recent liver enzymes are now coming down again from the 100 the the 40 range. Specific symptoms the patient has today or recently, that are or could be related to the liver disorder and were evaluated or discussed during this visit are as follows:    fatigue, arthralgias, myalgias,   These are better since prednisone was increased back to 20 mg. The patient is not experiencing the following symptoms of liver disease:  yellowing of the eyes or skin, itching, dark urine, problems concentrating, swelling of the abdomen, swelling of the lower extremities,     The patient completes all daily activities without any functional limitations. Since last clinic appointment  She was last seen in the clinic in 04/2021. She remains on prednisone 10 mg and Azathioprine 75 mg daily    She is complaining of easy bruising, and that her cataract is getting worse. She is also complaining of severe hair loss. She started on prednisone 10 mg about 4 weeks ago. The most recent LFT performed on 7/15 showed AST 24, ALT 15, ALP 30, Total bili 0.3. ASSESSMENT AND PLAN:  Autoimmune hepatitis:   The diagnosis is based on liver biopsy, and serologies that is positive for ASMA    Liver biopsy demonstrates severe aggressive inflammation, severe interface activity, severe PMN, stage 3 bridging  Fibrosis. Given the severity of the inflammation the degree of fibrosis may be overestimated. The Fibroscan can assess liver fibrosis and determine if a patient has advanced fibrosis or cirrhosis without the need for liver biopsy. This should be performed after the patient has had normal liver enzymes for about 6 months so the inflammation does not overestimate the degree of fibrosis.       The Fibroscan can be repeated annually or as often as clinically indicated to assess for fibrosis progression and/or regression. At diagnosis the liver enzymes were in the 2000 range    The patient was treated with prednisone 60 mg every day and azathioprine 50 mg. The liver enzymes came down nicely. The prednisone dose was tapered per protocol and the liver enzymes were normal in 1/2021. The prednisone dose was tapered below 20 mg and the liver enzymes increased. Azathioprine was increased to 100 mg every day but the liver enzymes continued to increase. The prednisone dose was increased back to 20 mg and the liver enzymes are now down from the 100s to the the 40s    Prednisone has been tapered to 10 mg daily and she has been on this dose for about 4 weeks  She remains on Azathioprine 75 mg daily    We will continue zelalem dose of immuno suppressive's for now. We can switch Azathioprine to Tacrolimus 1 mg BID then wait another 4 weeks before we begin to taper off prednisone    Elevation in Ferritin  There is an elevation in ferritin with Elevated iron saturation. HFE genetic testing was negative for any known mutations    The elevation in ferritin reflects hepatic inflammation and will decline back to normal with treatment of AIH. Hepatic lesion:   There is a 1.2 x 0.6 cm mass in the right lobe, segment 5. This has benign features on MRI and may represent a small sclerosed hemangioma. Can repeat imaging with US in 3-35 month to make certain there is no change in size. We will perform ultrasound of the liver next clinic visit    Screening for Hepatocellular Carcinoma  [address] screening is not necessary if the patient has no evidence of cirrhosis. Imaging shows indeterminate hepatic lesions suggestive of sclerosing hemangioma  AFP was negative.  CA 19-9 was normal.    Treatment of other medical problems in patients with chronic liver disease  There are no contraindications for the patient to take most medications that are necessary for treatment of other medical issues. Counseling for alcohol in patients with chronic liver disease  The patient does not consume any significant amount of alcohol. Vaccinations   Vaccination for viral hepatitis A is not needed. The patient has serologic evidence of prior exposure or vaccination with immunity  Routine vaccinations against other bacterial and viral agents can be performed as indicated. Annual flu vaccination should be administered if indicated. Health maintenance  Variceal surveillance None  CRC screening:  [address] surveillance: MRI 10/20: Small indeterminate hepatic lesion  Hepatitis A serology: Immune  Hepatitis B serology: Not determined  Influenza: Received 10/2020  Pneumonia: Prevnar followed by Pneumovax after 8 weeks    ALLERGIES  Allergies   Allergen Reactions    Sulfa (Sulfonamide Antibiotics) Unknown (comments)     Pt denies    Iodine Itching       MEDICATIONS  Current Outpatient Medications   Medication Sig    predniSONE (DELTASONE) 2.5 mg tablet Take 1 Tablet by mouth daily.  L.acid/L.casei/B.bif/B.deyanira/FOS (PROBIOTIC BLEND PO) Take  by mouth.  azaTHIOprine (IMURAN) 50 mg tablet Take 2 Tabs by mouth daily. Indications: liver inflammation resulting from an abnormal immune response (Patient taking differently: Take 75 mg by mouth daily. Indications: liver inflammation resulting from an abnormal immune response)    omega-3-dha-epa-fish oil (Omega-3 Fish Oil) 910-1,400 mg cap Take  by mouth.  turmeric/turmeric ext/pepr ext (turmeric-turmeric ext-pepper) 500-3 mg cap Take  by mouth.  folic acid (FOLVITE) 1 mg tablet     calcium carbonate (CALTREX) 600 mg calcium (1,500 mg) tablet Take 600 mg by mouth two (2) times a day.  cholecalciferol (VITAMIN D3) (2,000 UNITS /50 MCG) cap capsule Take  by mouth two (2) times a day.  multivitamin (ONE A DAY) tablet Take 1 Tab by mouth daily.  losartan (COZAAR) 50 mg tablet Take 25 mg by mouth nightly.  1/2 tab = 25 mg     peg 400-propylene glycol (Systane, propylene glycol,) 0.4-0.3 % drop Administer 1 Drop to both eyes as needed. No current facility-administered medications for this visit. SYSTEM REVIEW NOT RELATED TO LIVER DISEASE OR REVIEWED ABOVE:  Constitution systems: Negative for fever, chills, weight gain, weight loss. Eyes: Negative for visual changes. ENT: Negative for sore throat, painful swallowing. Respiratory: Negative for cough, hemoptysis, SOB. Cardiology: Negative for chest pain, palpitations. GI:  Negative for constipation or diarrhea. : Negative for urinary frequency, dysuria, hematuria, nocturia. Skin: Negative for rash. Hematology: Negative for easy bruising, blood clots. Musculo-skelatal: Positive for weakness and joint pains  Neurologic: Negative for headaches, dizziness, vertigo, memory problems not related to HE. Psychology: Negative for anxiety, depression. FAMILY HISTORY:  The father is .  of MI  The mother is .  of recurrent stroke  There is no family history of liver disease. The patient sister has SLE    SOCIAL HISTORY:  The patient is . The patient has 3 children and 9 grand children  The patient has never used tobacco products. The patient consumes alcohol on social occasions never in excess. The patient is retired.  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 2   BUN 8 - 27 mg/dL 8 12   Creat 0.57 - 1.00 mg/dL 0.79 0.88   Na 134 - 144 mmol/L 136 135   K 3.5 - 5.2 mmol/L 4.1 4.5   Cl 96 - 106 mmol/L 95 (L) 94 (L)   CO2 20 - 29 mmol/L 27 28   Glucose 65 - 99 mg/dL 79 74   Magnesium 1.6 - 2.4 mg/dL       SEROLOGIES:  Serologies Latest Ref Rng & Units 10/22/2020 10/20/2020   Hep B Surface Ag Index  <0.10   Hep B Surface Ag Interp NEG    Negative   Hep C Ab NR    NONREACTIVE   Ferritin 26 - 388 NG/ML 6,200 (H)    Iron % Saturation 20 - 50 % 93 (H)    ASMCA 0 - 19 Units 25 (H)    M2 Ab 0.0 - 20.0 Units <20.0    Ceruloplasmin 19.0 - 39.0 mg/dL 30.2      Serologies Latest Ref Rng & Units 1/8/2021   Hep A Ab, Total Negative Positive (A)     LIVER HISTOLOGY:  10/2020. Slides reviewed by MLS and NA. Chronic hepatitis. severe portal inflammation, with severe interface hepatitis, with severe PMN. severe lobular inflammation. NO steatosis. Sharmin stage 4 fibrosis. Metavir stage 3 fibrosis. Knodell score (5060). ENDOSCOPIC PROCEDURES:  Not available or performed    RADIOLOGY:  10/2020: MRI/MRCP liver: . Small indeterminate hepatic lesion is of questionable significance. A  sclerosing hemangioma is favored. 10/2020: CT scan WO Liver: Periportal edema vs intrahepatic biliary ductal dilation    OTHER TESTING:  Not available or performed      FOLLOW-UP:  All of the issues listed above in the Assessment and Plan were discussed with the patient. All questions were answered. The patient expressed a clear understanding of the above. [facility] 87 in 4 weeks to review all data and determine the treatment plan.       Nadiya Su MD, MPH  Advanced Hepatology  [facility] 13 of [address] [address] [place], [address] [facility] 22.  [phone]  [address] [facility]

## 2021-07-26 NOTE — Clinical Note
NOTIFICATION RETURN TO WORK / SCHOOL    7/26/2021 3:35 PM    Ms. Warner Bernal  [address] [id number]      To Whom It May Concern:    Warner Bernal is currently under the care of [address] She will return to work/school on: ***    If there are questions or concerns please have the patient contact our office.         Sincerely,      Sherell Severs, MD

## 2021-07-30 ENCOUNTER — TELEPHONE (OUTPATIENT)
Dept: HEMATOLOGY | Age: 66
End: 2021-07-30

## 2021-07-30 DIAGNOSIS — K75.4 AUTOIMMUNE HEPATITIS (HCC): Primary | ICD-10-CM

## 2021-07-30 NOTE — TELEPHONE ENCOUNTER
[email or web address] Dr. Jesica Andrea placed a call to patient to address MyChart message. I ordered labs and also mailed them to the patient.  (KF)

## 2021-09-14 LAB
ALBUMIN SERPL-MCNC: 4.2 G/DL (ref 3.8–4.8)
ALBUMIN/GLOB SERPL: 1.9 {RATIO} (ref 1.2–2.2)
ALP SERPL-CCNC: 39 IU/L (ref 44–121)
ALT SERPL-CCNC: 14 IU/L (ref 0–32)
AST SERPL-CCNC: 21 IU/L (ref 0–40)
BASOPHILS # BLD AUTO: 0 X10E3/UL (ref 0–0.2)
BASOPHILS NFR BLD AUTO: 0 %
BILIRUB SERPL-MCNC: 0.4 MG/DL (ref 0–1.2)
BUN SERPL-MCNC: 10 MG/DL (ref 8–27)
BUN/CREAT SERPL: 12 (ref 12–28)
CALCIUM SERPL-MCNC: 9.2 MG/DL (ref 8.7–10.3)
CHLORIDE SERPL-SCNC: 97 MMOL/L (ref 96–106)
CO2 SERPL-SCNC: 28 MMOL/L (ref 20–29)
CREAT SERPL-MCNC: 0.85 MG/DL (ref 0.57–1)
EOSINOPHIL # BLD AUTO: 0.1 X10E3/UL (ref 0–0.4)
EOSINOPHIL NFR BLD AUTO: 1 %
ERYTHROCYTE [DISTWIDTH] IN BLOOD BY AUTOMATED COUNT: 13.6 % (ref 11.7–15.4)
GLOBULIN SER CALC-MCNC: 2.2 G/DL (ref 1.5–4.5)
GLUCOSE SERPL-MCNC: 73 MG/DL (ref 65–99)
HCT VFR BLD AUTO: 43.8 % (ref 34–46.6)
HGB BLD-MCNC: 14.5 G/DL (ref 11.1–15.9)
IMM GRANULOCYTES # BLD AUTO: 0 X10E3/UL (ref 0–0.1)
IMM GRANULOCYTES NFR BLD AUTO: 0 %
LYMPHOCYTES # BLD AUTO: 1.9 X10E3/UL (ref 0.7–3.1)
LYMPHOCYTES NFR BLD AUTO: 33 %
MCH RBC QN AUTO: 29.8 PG (ref 26.6–33)
MCHC RBC AUTO-ENTMCNC: 33.1 G/DL (ref 31.5–35.7)
MCV RBC AUTO: 90 FL (ref 79–97)
MONOCYTES # BLD AUTO: 0.6 X10E3/UL (ref 0.1–0.9)
MONOCYTES NFR BLD AUTO: 11 %
NEUTROPHILS # BLD AUTO: 3.2 X10E3/UL (ref 1.4–7)
NEUTROPHILS NFR BLD AUTO: 55 %
PLATELET # BLD AUTO: 209 X10E3/UL (ref 150–450)
POTASSIUM SERPL-SCNC: 4 MMOL/L (ref 3.5–5.2)
PROT SERPL-MCNC: 6.4 G/DL (ref 6–8.5)
RBC # BLD AUTO: 4.87 X10E6/UL (ref 3.77–5.28)
SODIUM SERPL-SCNC: 140 MMOL/L (ref 134–144)
WBC # BLD AUTO: 5.8 X10E3/UL (ref 3.4–10.8)

## 2021-09-15 ENCOUNTER — PATIENT MESSAGE (OUTPATIENT)
Dept: HEMATOLOGY | Age: 66
End: 2021-09-15

## 2021-09-15 NOTE — TELEPHONE ENCOUNTER
[email or web address] printed encounter, will give to  once back in office from rounding in the hospital. Also routed message to MD.(KF)

## 2021-09-16 ENCOUNTER — PATIENT MESSAGE (OUTPATIENT)
Dept: HEMATOLOGY | Age: 66
End: 2021-09-16

## 2021-09-21 ENCOUNTER — OFFICE VISIT (OUTPATIENT)
Dept: HEMATOLOGY | Age: 66
End: 2021-09-21
Payer: MEDICARE

## 2021-09-21 VITALS
WEIGHT: 161.4 LBS | HEART RATE: 71 BPM | BODY MASS INDEX: 26.89 KG/M2 | HEIGHT: 65 IN | OXYGEN SATURATION: 97 % | TEMPERATURE: 96.2 F | DIASTOLIC BLOOD PRESSURE: 82 MMHG | SYSTOLIC BLOOD PRESSURE: 140 MMHG | RESPIRATION RATE: 16 BRPM

## 2021-09-21 DIAGNOSIS — K75.4 AUTOIMMUNE HEPATITIS (HCC): Primary | ICD-10-CM

## 2021-09-21 PROCEDURE — G9899 SCRN MAM PERF RSLTS DOC: HCPCS | Performed by: HOSPITALIST

## 2021-09-21 PROCEDURE — 1101F PT FALLS ASSESS-DOCD LE1/YR: CPT | Performed by: HOSPITALIST

## 2021-09-21 PROCEDURE — G8400 PT W/DXA NO RESULTS DOC: HCPCS | Performed by: HOSPITALIST

## 2021-09-21 PROCEDURE — G8419 CALC BMI OUT NRM PARAM NOF/U: HCPCS | Performed by: HOSPITALIST

## 2021-09-21 PROCEDURE — G8536 NO DOC ELDER MAL SCRN: HCPCS | Performed by: HOSPITALIST

## 2021-09-21 PROCEDURE — G8754 DIAS BP LESS 90: HCPCS | Performed by: HOSPITALIST

## 2021-09-21 PROCEDURE — G8510 SCR DEP NEG, NO PLAN REQD: HCPCS | Performed by: HOSPITALIST

## 2021-09-21 PROCEDURE — G0463 HOSPITAL OUTPT CLINIC VISIT: HCPCS | Performed by: HOSPITALIST

## 2021-09-21 PROCEDURE — 1090F PRES/ABSN URINE INCON ASSESS: CPT | Performed by: HOSPITALIST

## 2021-09-21 PROCEDURE — G8427 DOCREV CUR MEDS BY ELIG CLIN: HCPCS | Performed by: HOSPITALIST

## 2021-09-21 PROCEDURE — G8753 SYS BP > OR = 140: HCPCS | Performed by: HOSPITALIST

## 2021-09-21 PROCEDURE — 99214 OFFICE O/P EST MOD 30 MIN: CPT | Performed by: HOSPITALIST

## 2021-09-21 PROCEDURE — 3017F COLORECTAL CA SCREEN DOC REV: CPT | Performed by: HOSPITALIST

## 2021-09-21 RX ORDER — CALCIUM CARBONATE/VITAMIN D3 600 MG-10
30 TABLET ORAL EVERY OTHER DAY
COMMUNITY

## 2021-09-21 RX ORDER — PREDNISONE 10 MG/1
TABLET ORAL
COMMUNITY
Start: 2021-07-30 | End: 2021-09-21 | Stop reason: ALTCHOICE

## 2021-09-21 RX ORDER — PREDNISONE 5 MG/1
5 TABLET ORAL DAILY
Qty: 30 TABLET | Refills: 1 | Status: SHIPPED | OUTPATIENT
Start: 2021-09-21 | End: 2021-11-23

## 2021-09-21 RX ORDER — LOSARTAN POTASSIUM 25 MG/1
12.5 TABLET ORAL
COMMUNITY
Start: 2021-07-02

## 2021-09-21 NOTE — PATIENT INSTRUCTIONS
We will reduce prednisone to 5 mg daily for 2 weeks and then every other day for another 2 weeks and then stopped.     Continue Azathioprine 50 mg daily

## 2021-09-21 NOTE — PROGRESS NOTES
Identified pt with two pt identifiers(name and ). Reviewed record in preparation for visit and have obtained necessary documentation. Chief Complaint   Patient presents with    Follow-up        Health Maintenance Due   Topic    DTaP/Tdap/Td series (1 - Tdap)    Lipid Screen     Shingrix Vaccine Age 50> (1 of 2)    Bone Densitometry (Dexa) Screening     Medicare Yearly Exam     COVID-19 Vaccine (3 - Pfizer risk 3-dose series)    Flu Vaccine (1)        Visit Vitals  BP (!) 140/82 (BP 1 Location: Left upper arm, BP Patient Position: Sitting, BP Cuff Size: Adult)   Pulse 71   Temp (!) 96.2 °F (35.7 °C) (Temporal)   Resp 16   Ht 5' 5\" (1.651 m)   Wt 161 lb 6.4 oz (73.2 kg)   SpO2 97%   BMI 26.86 kg/m²     Pain Scale: 0 - No pain/10    Coordination of Care Questionnaire:  :   1. Have you been to the ER, urgent care clinic since your last visit? Hospitalized since your last visit? No    2. Have you seen or consulted any other health care providers outside of the [address] [place] [address] since your last visit? Include any pap smears or colon screening.  No

## 2021-09-21 NOTE — LETTER
9/21/2021    Patient: Rolando Tripp   YOB: 1955   Date of Visit: 9/21/2021     Ruthy Vargas MD  [facility] [address]  Via Fax: [phone]    Dear Ruthy Vargas MD,      Thank you for referring Ms. Jairon Dolan to [facility] for evaluation. My notes for this consultation are attached. If you have questions, please do not hesitate to call me. I look forward to following your patient along with you.       Sincerely,    Cat Aguiar MD

## 2021-09-24 ENCOUNTER — TELEPHONE (OUTPATIENT)
Dept: HEMATOLOGY | Age: 66
End: 2021-09-24

## 2021-09-24 DIAGNOSIS — K75.4 AUTOIMMUNE HEPATITIS (HCC): Primary | ICD-10-CM

## 2021-09-24 NOTE — TELEPHONE ENCOUNTER
[email or web address] Patient request from Dr. Alberto Emerson to have a cortisol level be drawn in October. Rec'd ok to add this test on. I will mail new lab orders to patient. I have sent patient a [employer] message with the update. (KF)

## 2021-10-06 ENCOUNTER — HOSPITAL ENCOUNTER (OUTPATIENT)
Dept: ULTRASOUND IMAGING | Age: 66
Discharge: HOME | End: 2021-10-06
Attending: HOSPITALIST
Payer: MEDICARE

## 2021-10-06 DIAGNOSIS — K75.4 AUTOIMMUNE HEPATITIS (HCC): ICD-10-CM

## 2021-10-06 PROCEDURE — 76705 ECHO EXAM OF ABDOMEN: CPT

## 2021-10-15 DIAGNOSIS — R74.8 ABNORMAL LIVER ENZYMES: ICD-10-CM

## 2021-10-15 DIAGNOSIS — K75.4 AUTOIMMUNE HEPATITIS (HCC): Primary | ICD-10-CM

## 2021-10-20 LAB
ALBUMIN SERPL-MCNC: 4.1 G/DL (ref 3.8–4.8)
ALBUMIN/GLOB SERPL: 2.4 {RATIO} (ref 1.2–2.2)
ALP SERPL-CCNC: 40 IU/L (ref 44–121)
ALT SERPL-CCNC: 20 IU/L (ref 0–32)
AST SERPL-CCNC: 26 IU/L (ref 0–40)
BASOPHILS # BLD AUTO: 0 X10E3/UL (ref 0–0.2)
BASOPHILS NFR BLD AUTO: 0 %
BILIRUB SERPL-MCNC: 0.5 MG/DL (ref 0–1.2)
BUN SERPL-MCNC: 11 MG/DL (ref 8–27)
BUN/CREAT SERPL: 13 (ref 12–28)
CALCIUM SERPL-MCNC: 9 MG/DL (ref 8.7–10.3)
CHLORIDE SERPL-SCNC: 98 MMOL/L (ref 96–106)
CO2 SERPL-SCNC: 28 MMOL/L (ref 20–29)
CREAT SERPL-MCNC: 0.87 MG/DL (ref 0.57–1)
EOSINOPHIL # BLD AUTO: 0.1 X10E3/UL (ref 0–0.4)
EOSINOPHIL NFR BLD AUTO: 3 %
ERYTHROCYTE [DISTWIDTH] IN BLOOD BY AUTOMATED COUNT: 13.5 % (ref 11.7–15.4)
GLOBULIN SER CALC-MCNC: 1.7 G/DL (ref 1.5–4.5)
GLUCOSE SERPL-MCNC: 74 MG/DL (ref 65–99)
HCT VFR BLD AUTO: 39.3 % (ref 34–46.6)
HGB BLD-MCNC: 13.3 G/DL (ref 11.1–15.9)
IMM GRANULOCYTES # BLD AUTO: 0 X10E3/UL (ref 0–0.1)
IMM GRANULOCYTES NFR BLD AUTO: 0 %
LYMPHOCYTES # BLD AUTO: 1.3 X10E3/UL (ref 0.7–3.1)
LYMPHOCYTES NFR BLD AUTO: 28 %
MCH RBC QN AUTO: 29.8 PG (ref 26.6–33)
MCHC RBC AUTO-ENTMCNC: 33.8 G/DL (ref 31.5–35.7)
MCV RBC AUTO: 88 FL (ref 79–97)
MONOCYTES # BLD AUTO: 0.5 X10E3/UL (ref 0.1–0.9)
MONOCYTES NFR BLD AUTO: 10 %
NEUTROPHILS # BLD AUTO: 2.8 X10E3/UL (ref 1.4–7)
NEUTROPHILS NFR BLD AUTO: 59 %
PLATELET # BLD AUTO: 205 X10E3/UL (ref 150–450)
POTASSIUM SERPL-SCNC: 3.9 MMOL/L (ref 3.5–5.2)
PROT SERPL-MCNC: 5.8 G/DL (ref 6–8.5)
RBC # BLD AUTO: 4.46 X10E6/UL (ref 3.77–5.28)
SODIUM SERPL-SCNC: 138 MMOL/L (ref 134–144)
WBC # BLD AUTO: 4.7 X10E3/UL (ref 3.4–10.8)

## 2021-10-21 LAB
CORTIS SERPL-MCNC: 5.3 UG/DL
IGG SERPL-MCNC: 635 MG/DL (ref 586–1602)

## 2021-11-10 ENCOUNTER — TELEPHONE (OUTPATIENT)
Dept: HEMATOLOGY | Age: 66
End: 2021-11-10

## 2021-11-13 LAB
ALBUMIN SERPL-MCNC: 4.1 G/DL (ref 3.8–4.8)
ALP SERPL-CCNC: 45 IU/L (ref 44–121)
ALT SERPL-CCNC: 14 IU/L (ref 0–32)
AST SERPL-CCNC: 21 IU/L (ref 0–40)
BASOPHILS # BLD AUTO: 0 X10E3/UL (ref 0–0.2)
BASOPHILS NFR BLD AUTO: 0 %
BILIRUB DIRECT SERPL-MCNC: <0.1 MG/DL (ref 0–0.4)
BILIRUB SERPL-MCNC: 0.2 MG/DL (ref 0–1.2)
EOSINOPHIL # BLD AUTO: 0.2 X10E3/UL (ref 0–0.4)
EOSINOPHIL NFR BLD AUTO: 5 %
ERYTHROCYTE [DISTWIDTH] IN BLOOD BY AUTOMATED COUNT: 13.4 % (ref 11.7–15.4)
HCT VFR BLD AUTO: 40.9 % (ref 34–46.6)
HGB BLD-MCNC: 13.4 G/DL (ref 11.1–15.9)
IMM GRANULOCYTES # BLD AUTO: 0 X10E3/UL (ref 0–0.1)
IMM GRANULOCYTES NFR BLD AUTO: 0 %
LYMPHOCYTES # BLD AUTO: 1.9 X10E3/UL (ref 0.7–3.1)
LYMPHOCYTES NFR BLD AUTO: 42 %
MCH RBC QN AUTO: 29.3 PG (ref 26.6–33)
MCHC RBC AUTO-ENTMCNC: 32.8 G/DL (ref 31.5–35.7)
MCV RBC AUTO: 89 FL (ref 79–97)
MONOCYTES # BLD AUTO: 0.5 X10E3/UL (ref 0.1–0.9)
MONOCYTES NFR BLD AUTO: 11 %
NEUTROPHILS # BLD AUTO: 1.9 X10E3/UL (ref 1.4–7)
NEUTROPHILS NFR BLD AUTO: 42 %
PLATELET # BLD AUTO: 195 X10E3/UL (ref 150–450)
PROT SERPL-MCNC: 5.8 G/DL (ref 6–8.5)
RBC # BLD AUTO: 4.58 X10E6/UL (ref 3.77–5.28)
WBC # BLD AUTO: 4.5 X10E3/UL (ref 3.4–10.8)

## 2021-11-23 ENCOUNTER — OFFICE VISIT (OUTPATIENT)
Dept: CARDIOLOGY CLINIC | Age: 66
End: 2021-11-23
Payer: MEDICARE

## 2021-11-23 VITALS
WEIGHT: 156 LBS | BODY MASS INDEX: 25.99 KG/M2 | DIASTOLIC BLOOD PRESSURE: 70 MMHG | HEART RATE: 75 BPM | OXYGEN SATURATION: 99 % | HEIGHT: 65 IN | SYSTOLIC BLOOD PRESSURE: 138 MMHG

## 2021-11-23 DIAGNOSIS — I10 ESSENTIAL HYPERTENSION: ICD-10-CM

## 2021-11-23 DIAGNOSIS — I10 PRIMARY HYPERTENSION: ICD-10-CM

## 2021-11-23 DIAGNOSIS — I48.91 ATRIAL FIBRILLATION, UNSPECIFIED TYPE (HCC): Primary | ICD-10-CM

## 2021-11-23 PROCEDURE — G8419 CALC BMI OUT NRM PARAM NOF/U: HCPCS | Performed by: SPECIALIST

## 2021-11-23 PROCEDURE — G8752 SYS BP LESS 140: HCPCS | Performed by: SPECIALIST

## 2021-11-23 PROCEDURE — G8432 DEP SCR NOT DOC, RNG: HCPCS | Performed by: SPECIALIST

## 2021-11-23 PROCEDURE — G8536 NO DOC ELDER MAL SCRN: HCPCS | Performed by: SPECIALIST

## 2021-11-23 PROCEDURE — G8427 DOCREV CUR MEDS BY ELIG CLIN: HCPCS | Performed by: SPECIALIST

## 2021-11-23 PROCEDURE — 3017F COLORECTAL CA SCREEN DOC REV: CPT | Performed by: SPECIALIST

## 2021-11-23 PROCEDURE — 1090F PRES/ABSN URINE INCON ASSESS: CPT | Performed by: SPECIALIST

## 2021-11-23 PROCEDURE — 1101F PT FALLS ASSESS-DOCD LE1/YR: CPT | Performed by: SPECIALIST

## 2021-11-23 PROCEDURE — 93005 ELECTROCARDIOGRAM TRACING: CPT | Performed by: SPECIALIST

## 2021-11-23 PROCEDURE — G0463 HOSPITAL OUTPT CLINIC VISIT: HCPCS | Performed by: SPECIALIST

## 2021-11-23 PROCEDURE — G9899 SCRN MAM PERF RSLTS DOC: HCPCS | Performed by: SPECIALIST

## 2021-11-23 PROCEDURE — 99214 OFFICE O/P EST MOD 30 MIN: CPT | Performed by: SPECIALIST

## 2021-11-23 PROCEDURE — G8400 PT W/DXA NO RESULTS DOC: HCPCS | Performed by: SPECIALIST

## 2021-11-23 PROCEDURE — 93010 ELECTROCARDIOGRAM REPORT: CPT | Performed by: SPECIALIST

## 2021-11-23 PROCEDURE — G8754 DIAS BP LESS 90: HCPCS | Performed by: SPECIALIST

## 2021-11-23 NOTE — PATIENT INSTRUCTIONS
1) we will check your echocardiogram secondary to your heart function being about 40 to 50% during the time that you had elevated liver enzymes.   I believe this may be why his heart function was down slightly     2) we will give you information on calcium scoring    3) would have you follow-up in 6 to 8 weeks for review of your echo or you could do a virtual visit if you should

## 2021-11-23 NOTE — PROGRESS NOTES
CARDIOLOGY OFFICE NOTE    Az Goldberg MD, [address], [place], [address] [facility]  Phone [phone]; Fax [phone]  Mobile [phone]   Voice Mail [phone]    Primary care: Babak Mcintyre MD       ATTENTION:   This medical record was transcribed using an electronic medical records/speech recognition system. Although proofread, it may and can contain electronic, spelling and other errors. Corrections may be executed at a later time. Please feel free to contact us for any clarifications as needed. ICD-10-CM ICD-9-CM   1. Atrial fibrillation, unspecified type ([facility]  I48.91 427.31   2. Essential hypertension  I10 401.9   3. Primary hypertension  I10 401.9            Jasen Velasco is a 77 y.o. female with  referred for abnormal ECG          Cardiac risk factors: family history, hypertension, post-menopausal  I have personally obtained the history from the patient. HISTORY OF PRESENTING ILLNESS    Jasen Velasco is a 76 yo female who I saw in the hospital during the autoimmune hepatitis. At the time they check an echo on her EF is 45 to 50%. She does have a long history of a left bundle branch block she says. He said she had primary care appointment and was instructed to see cardiology because of her reduced EF.   She gives me no symptoms of heart failure or angina.              ACTIVE PROBLEM LIST     Patient Active Problem List    Diagnosis Date Noted    Autoimmune hepatitis ([facility] 11/27/2020    Hypertension            PAST MEDICAL HISTORY     Past Medical History:   Diagnosis Date    Bilateral dry eyes     Herpes simplex     Includes herpes labialis    History of mammogram 10/13/2016; 11/13/18; 01/03/2020    negative; Negative; negative birads 1 dense    Hx of mammogram 09/30/14; 10/1/15    normal; negative    Hx of mammogram 01/15/2021    BI-RADS 1: Negative    Hypertension     Pap smear for cervical cancer screening 1/15/21, 9/14/10; 9/18/12; 10/1/15; 11/13/18    Negative, HPV negative; Negative; Negative, HPV negative; Neg/Neg HPV           PAST SURGICAL HISTORY     Past Surgical History:   Procedure Laterality Date    COLONOSCOPY N/A 12/5/2018    COLONOSCOPY performed by Kobi Solano MD at [facility] HX COLONOSCOPY  2006    Wnl    HX GYN  1982    tubal ligation    WI LAP,TUBAL CAUTERY            ALLERGIES     Allergies   Allergen Reactions    Sulfa (Sulfonamide Antibiotics) Unknown (comments)     Pt denies    Iodine Itching          FAMILY HISTORY     Family History   Problem Relation Age of Onset    Hypertension Mother     Arthritis-osteo Mother     Osteoporosis Mother     Cancer Mother 80        lymphoma    Stroke Mother     Hypertension Father     Stroke Father     Hypertension Sister     Arthritis-osteo Sister     Cancer Sister     Hypertension Brother     Cancer Maternal Uncle         colon    negative for cardiac disease       SOCIAL HISTORY     Social History     Socioeconomic History    Marital status:    Tobacco Use    Smoking status: Never Smoker    Smokeless tobacco: Never Used   Substance and Sexual Activity    Alcohol use: Not Currently     Alcohol/week: 2.0 standard drinks     Types: 2 Glasses of wine per week     Comment: previously drank 2 glasses of wine for 35 years    Drug use: No    Sexual activity: Yes     Partners: Male     Birth control/protection: Surgical         MEDICATIONS     Current Outpatient Medications   Medication Sig    losartan (COZAAR) 25 mg tablet 12.5 mg.    zinc gluconate 30 mg tab Take 30 mg by mouth every other day.  L.acid/L.casei/B.bif/B.deyanira/FOS (PROBIOTIC BLEND PO) Take  by mouth.  azaTHIOprine (IMURAN) 50 mg tablet Take 2 Tabs by mouth daily. Indications: liver inflammation resulting from an abnormal immune response (Patient taking differently: Take 50 mg by mouth daily.  1 tablet once daily  Indications: liver inflammation resulting from an abnormal immune response)    omega-3-dha-epa-fish oil (Omega-3 Fish Oil) 910-1,400 mg cap Take  by mouth.  turmeric/turmeric ext/pepr ext (turmeric-turmeric ext-pepper) 500-3 mg cap Take  by mouth.  folic acid (FOLVITE) 1 mg tablet     calcium carbonate (CALTREX) 600 mg calcium (1,500 mg) tablet Take 600 mg by mouth two (2) times a day.  cholecalciferol (VITAMIN D3) (2,000 UNITS /50 MCG) cap capsule Take  by mouth two (2) times a day.  multivitamin (ONE A DAY) tablet Take 1 Tab by mouth daily.  predniSONE (DELTASONE) 5 mg tablet Take 1 Tablet by mouth daily. (Patient not taking: Reported on 11/23/2021)    losartan (COZAAR) 50 mg tablet Take 25 mg by mouth nightly. 1/2 tab = 25 mg  (Patient not taking: Reported on 9/21/2021)    peg 400-propylene glycol (Systane, propylene glycol,) 0.4-0.3 % drop Administer 1 Drop to both eyes as needed. (Patient not taking: Reported on 9/21/2021)     No current facility-administered medications for this visit. I have reviewed the nurses notes, vitals, problem list, allergy list, medical history, family, social history and medications. REVIEW OF SYMPTOMS      General: Pt denies excessive weight gain or loss. Pt is able to conduct ADL's  HEENT: Denies blurred vision, headaches, hearing loss, epistaxis and difficulty swallowing. Respiratory: Denies cough, congestion, shortness of breath, MEREDITH, wheezing or stridor. Cardiovascular: Denies precordial pain, palpitations, edema or PND  Gastrointestinal: Denies poor appetite, indigestion, abdominal pain or blood in stool  Genitourinary: Denies hematuria, dysuria, increased urinary frequency  Musculoskeletal: Denies joint pain or swelling from muscles or joints  Neurologic: Denies tremor, paresthesias, headache, or sensory motor disturbance  Psychiatric: Denies confusion, insomnia, depression  Integumentray: Denies rash, itching or ulcers.   Hematologic: Denies easy bruising, bleeding PHYSICAL EXAMINATION      Vitals:    21 1046   BP: 138/70   Pulse: 75   SpO2: 99%   Weight: 156 lb (70.8 kg)   Height: 5' 5\" (1.651 m)     General: Well developed, in no acute distress. HEENT: No jaundice, oral mucosa moist, no oral ulcers  Neck: Supple, no stiffness, no lymphadenopathy, supple  Heart:  Normal S1/S2 negative S3 or S4. Regular, no murmur, gallop or rub, no jugular venous distention  Respiratory: Clear bilaterally x 4, no wheezing or rales  Abdomen:   Soft, non-tender, bowel sounds are active. Extremities:  No edema, normal cap refill, no cyanosis. Musculoskeletal: No clubbing, no deformities  Neuro: A&Ox3, speech clear, gait stable, cooperative, no focal neurologic deficits  Skin: Skin color is normal. No rashes or lesions. Non diaphoretic, moist.  Vascular: 2+ pulses symmetric in all extremities        [id number] normal sinus rhythm with left bundle branch block     DIAGNOSTIC DATA     1. Echo  10/22/20-EF 45 - 50%, mild AI/MR/PI, IVC: Moderately elevated central venous pressure (10-15 mmHg); IVC diameter is larger than 21 mm and collapses more than 50% with respiration. 2. Lipids  11/3/21- , HDL 96, .4, TG 58         LABORATORY DATA            Lab Results   Component Value Date/Time    WBC 4.5 2021 02:53 PM    HGB 13.4 2021 02:53 PM    HCT 40.9 2021 02:53 PM    PLATELET 735  02:53 PM    MCV 89 2021 02:53 PM      Lab Results   Component Value Date/Time    Sodium 138 10/20/2021 10:57 AM    Potassium 3.9 10/20/2021 10:57 AM    Chloride 98 10/20/2021 10:57 AM    CO2 28 10/20/2021 10:57 AM    Anion gap 5 10/23/2020 02:56 AM    Glucose 74 10/20/2021 10:57 AM    BUN 11 10/20/2021 10:57 AM    Creatinine 0.87 10/20/2021 10:57 AM    BUN/Creatinine ratio 13 10/20/2021 10:57 AM    GFR est AA 80 10/20/2021 10:57 AM    GFR est non-AA 70 10/20/2021 10:57 AM    Calcium 9.0 10/20/2021 10:57 AM    Bilirubin, total 0.2 2021 02:53 PM    Alk. phosphatase 45 11/12/2021 02:53 PM    Protein, total 5.8 (L) 11/12/2021 02:53 PM    Albumin 4.1 11/12/2021 02:53 PM    Globulin 3.1 10/23/2020 02:56 AM    A-G Ratio 2.4 (H) 10/20/2021 10:57 AM    ALT (SGPT) 14 11/12/2021 02:53 PM           ASSESSMENT/RECOMMENDATIONS:.      1. Mild cardiomyopathy  2. Hypertension  3. Mild AI  4. Orders Placed This Encounter    AMB POC EKG ROUTINE W/ 12 LEADS, INTER & REP     Order Specific Question:   Reason for Exam:     Answer:   HTN       We discussed the expected course, resolution and complications of the diagnosis(es) in detail. Medication risks, benefits, costs, interactions, and alternatives were discussed as indicated. I advised him to contact the office if his condition worsens, changes or fails to improve as anticipated. He expressed understanding with the diagnosis(es) and plan          Follow-up and Dispositions  ·   Return in about 6 months (around 5/23/2022). I have discussed the diagnosis with  Jairon Dolan and the intended plan as seen in the above orders. Questions were answered concerning future plans. I have discussed medication side effects and warnings with the patient as well. Thank you,  [facility], Zurdo Leiva MD for involving me in the care of  Jairon Dolan. Please do not hesitate to contact me for further questions/concerns. Az Sethi MD, [address] [place] [address] Rd., [address]      [facility], [address] [place] [address] 57      [phone] / [phone] Fax

## 2021-11-23 NOTE — PROGRESS NOTES
Whitney Hyatt is a 77 y.o. female    Visit Vitals  /70 (BP 1 Location: Left upper arm, BP Patient Position: Sitting, BP Cuff Size: Adult)   Pulse 75   Ht 5' 5\" (1.651 m)   Wt 156 lb (70.8 kg)   SpO2 99%   BMI 25.96 kg/m²       Chief Complaint   Patient presents with    Hypertension    Other     LFT ELEVATION       Chest pain NO  SOB NO  Dizziness NO  Swelling NO  Recent hospital visit NO  Refills NO  COVID VACCINE STATUS YES  HAD COVID?  NO

## 2021-11-23 NOTE — LETTER
11/23/2021    Patient: Tanya Quevedo   YOB: 1955   Date of Visit: 11/23/2021     Vickie Clarke MD  [facility] [address]  Via Fax: [phone]    Dear Vickie Clarke MD,      Thank you for referring Ms. Jairon Dolan to [facility] for evaluation. My notes for this consultation are attached. If you have questions, please do not hesitate to call me. I look forward to following your patient along with you.       Sincerely,    Julia Jeffries MD

## 2021-12-10 ENCOUNTER — HOSPITAL ENCOUNTER (EMERGENCY)
Age: 66
Discharge: HOME | End: 2021-12-10
Attending: EMERGENCY MEDICINE
Payer: MEDICARE

## 2021-12-10 ENCOUNTER — PATIENT MESSAGE (OUTPATIENT)
Dept: HEMATOLOGY | Age: 66
End: 2021-12-10

## 2021-12-10 ENCOUNTER — APPOINTMENT (OUTPATIENT)
Dept: CT IMAGING | Age: 66
End: 2021-12-10
Attending: PHYSICIAN ASSISTANT
Payer: MEDICARE

## 2021-12-10 VITALS
RESPIRATION RATE: 18 BRPM | DIASTOLIC BLOOD PRESSURE: 87 MMHG | WEIGHT: 156 LBS | HEART RATE: 74 BPM | HEIGHT: 66 IN | OXYGEN SATURATION: 100 % | SYSTOLIC BLOOD PRESSURE: 154 MMHG | BODY MASS INDEX: 25.07 KG/M2

## 2021-12-10 DIAGNOSIS — K76.9 LIVER LESION: ICD-10-CM

## 2021-12-10 DIAGNOSIS — R19.7 DIARRHEA, UNSPECIFIED TYPE: Primary | ICD-10-CM

## 2021-12-10 DIAGNOSIS — K92.1 HEMATOCHEZIA: ICD-10-CM

## 2021-12-10 LAB
ABO + RH BLD: NORMAL
ALBUMIN SERPL-MCNC: 3.5 G/DL (ref 3.5–5)
ALBUMIN/GLOB SERPL: 1.1 {RATIO} (ref 1.1–2.2)
ALP SERPL-CCNC: 51 U/L (ref 45–117)
ALT SERPL-CCNC: 16 U/L (ref 12–78)
ANION GAP SERPL CALC-SCNC: 2 MMOL/L (ref 5–15)
APPEARANCE UR: CLEAR
AST SERPL-CCNC: 25 U/L (ref 15–37)
BACTERIA URNS QL MICRO: NEGATIVE /HPF
BASOPHILS # BLD: 0 K/UL (ref 0–0.1)
BASOPHILS NFR BLD: 0 % (ref 0–1)
BILIRUB SERPL-MCNC: 0.5 MG/DL (ref 0.2–1)
BILIRUB UR QL: NEGATIVE
BLOOD GROUP ANTIBODIES SERPL: NORMAL
BUN SERPL-MCNC: 12 MG/DL (ref 6–20)
BUN/CREAT SERPL: 15 (ref 12–20)
CALCIUM SERPL-MCNC: 9 MG/DL (ref 8.5–10.1)
CHLORIDE SERPL-SCNC: 103 MMOL/L (ref 97–108)
CO2 SERPL-SCNC: 33 MMOL/L (ref 21–32)
COLOR UR: ABNORMAL
COMMENT, HOLDF: NORMAL
CREAT SERPL-MCNC: 0.82 MG/DL (ref 0.55–1.02)
DIFFERENTIAL METHOD BLD: NORMAL
EOSINOPHIL # BLD: 0.1 K/UL (ref 0–0.4)
EOSINOPHIL NFR BLD: 3 % (ref 0–7)
EPITH CASTS URNS QL MICRO: ABNORMAL /LPF
ERYTHROCYTE [DISTWIDTH] IN BLOOD BY AUTOMATED COUNT: 12.4 % (ref 11.5–14.5)
GLOBULIN SER CALC-MCNC: 3.3 G/DL (ref 2–4)
GLUCOSE SERPL-MCNC: 74 MG/DL (ref 65–100)
GLUCOSE UR STRIP.AUTO-MCNC: NEGATIVE MG/DL
HCT VFR BLD AUTO: 40.5 % (ref 35–47)
HGB BLD-MCNC: 14.4 G/DL (ref 11.5–16)
HGB UR QL STRIP: ABNORMAL
HYALINE CASTS URNS QL MICRO: ABNORMAL /LPF (ref 0–5)
IMM GRANULOCYTES # BLD AUTO: 0 K/UL (ref 0–0.04)
IMM GRANULOCYTES NFR BLD AUTO: 0 % (ref 0–0.5)
KETONES UR QL STRIP.AUTO: NEGATIVE MG/DL
LEUKOCYTE ESTERASE UR QL STRIP.AUTO: NEGATIVE
LIPASE SERPL-CCNC: 145 U/L (ref 73–393)
LYMPHOCYTES # BLD: 1.4 K/UL (ref 0.8–3.5)
LYMPHOCYTES NFR BLD: 29 % (ref 12–49)
MCH RBC QN AUTO: 29.4 PG (ref 26–34)
MCHC RBC AUTO-ENTMCNC: 35.6 G/DL (ref 30–36.5)
MCV RBC AUTO: 82.8 FL (ref 80–99)
MONOCYTES # BLD: 0.4 K/UL (ref 0–1)
MONOCYTES NFR BLD: 7 % (ref 5–13)
NEUTS SEG # BLD: 3 K/UL (ref 1.8–8)
NEUTS SEG NFR BLD: 61 % (ref 32–75)
NITRITE UR QL STRIP.AUTO: NEGATIVE
NRBC # BLD: 0 K/UL (ref 0–0.01)
NRBC BLD-RTO: 0 PER 100 WBC
PH UR STRIP: 7 [PH] (ref 5–8)
PLATELET # BLD AUTO: 206 K/UL (ref 150–400)
PMV BLD AUTO: 10.8 FL (ref 8.9–12.9)
POTASSIUM SERPL-SCNC: 3.9 MMOL/L (ref 3.5–5.1)
PROT SERPL-MCNC: 6.8 G/DL (ref 6.4–8.2)
PROT UR STRIP-MCNC: NEGATIVE MG/DL
RBC # BLD AUTO: 4.89 M/UL (ref 3.8–5.2)
RBC #/AREA URNS HPF: ABNORMAL /HPF (ref 0–5)
SAMPLES BEING HELD,HOLD: NORMAL
SODIUM SERPL-SCNC: 138 MMOL/L (ref 136–145)
SP GR UR REFRACTOMETRY: <1.005 (ref 1–1.03)
SPECIMEN EXP DATE BLD: NORMAL
UR CULT HOLD, URHOLD: NORMAL
UROBILINOGEN UR QL STRIP.AUTO: 0.2 EU/DL (ref 0.2–1)
WBC # BLD AUTO: 4.9 K/UL (ref 3.6–11)
WBC URNS QL MICRO: ABNORMAL /HPF (ref 0–4)

## 2021-12-10 PROCEDURE — 36415 COLL VENOUS BLD VENIPUNCTURE: CPT

## 2021-12-10 PROCEDURE — 74178 CT ABD&PLV WO CNTR FLWD CNTR: CPT

## 2021-12-10 PROCEDURE — 85025 COMPLETE CBC W/AUTO DIFF WBC: CPT

## 2021-12-10 PROCEDURE — 74011250636 HC RX REV CODE- 250/636: Performed by: PHYSICIAN ASSISTANT

## 2021-12-10 PROCEDURE — 86901 BLOOD TYPING SEROLOGIC RH(D): CPT

## 2021-12-10 PROCEDURE — 99282 EMERGENCY DEPT VISIT SF MDM: CPT

## 2021-12-10 PROCEDURE — 81001 URINALYSIS AUTO W/SCOPE: CPT

## 2021-12-10 PROCEDURE — 80053 COMPREHEN METABOLIC PANEL: CPT

## 2021-12-10 PROCEDURE — 83690 ASSAY OF LIPASE: CPT

## 2021-12-10 PROCEDURE — 74011000636 HC RX REV CODE- 636: Performed by: RADIOLOGY

## 2021-12-10 RX ADMIN — SODIUM CHLORIDE 1000 ML: 9 INJECTION, SOLUTION INTRAVENOUS at 09:50

## 2021-12-10 RX ADMIN — IOPAMIDOL 100 ML: 755 INJECTION, SOLUTION INTRAVENOUS at 10:31

## 2021-12-10 NOTE — ED TRIAGE NOTES
Pt with 10 weeks of diarrhea. Here today because she has noticed some BRBPR a couple of times. Awaiting GI appt.

## 2021-12-10 NOTE — ED PROVIDER NOTES
28BU with hx of autoimmune hepatitis in remission since July, just tapered off steroids in November having 2-3 loose stools x 10 weeks. States intermittent BRB in stool, noticeable today. Denies ABD pain, F/C, vomiting. Was on abx \"for possible bacterial infection\", stopped taking after a few days. PCP and hepatologist have referred her to GI but GI cannot see her until January 16th.     GI: Eric Willis- last colonoscopy 2018  Hep: Leonora Singh  PCP: Dov Gruber           Past Medical History:   Diagnosis Date    Bilateral dry eyes     Herpes simplex     Includes herpes labialis    History of mammogram 10/13/2016; 11/13/18; 01/03/2020    negative; Negative; negative birads 1 dense    Hx of mammogram 09/30/14; 10/1/15    normal; negative    Hx of mammogram 01/15/2021    BI-RADS 1: Negative    Hypertension     Pap smear for cervical cancer screening 1/15/21, 9/14/10; 9/18/12; 10/1/15; 11/13/18    Negative, HPV negative; Negative; Negative, HPV negative; Neg/Neg HPV       Past Surgical History:   Procedure Laterality Date    COLONOSCOPY N/A 12/5/2018    [facility] at [facility] HX COLONOSCOPY  2006    Wnl    HX GYN  1982    tubal ligation    MI LAP,TUBAL CAUTERY           Family History:   Problem Relation Age of Onset    Hypertension Mother     OSTEOARTHRITIS Mother     Osteoporosis Mother     Cancer Mother 80        lymphoma    Stroke Mother     Hypertension Father     Stroke Father     Hypertension Sister     OSTEOARTHRITIS Sister    Sherolyn Hooper Sister     Hypertension Brother     Cancer Maternal Uncle         colon       Social History     Socioeconomic History    Marital status:      Spouse name: Not on file    Number of children: Not on file    Years of education: Not on file    Highest education level: Not on file   Occupational History    Not on file   Tobacco Use    Smoking status: Never Smoker    Smokeless tobacco: Never Used   Substance and Sexual Activity    Alcohol use: Not Currently     Alcohol/week: 2.0 standard drinks     Types: 2 Glasses of wine per week     Comment: previously drank 2 glasses of wine for 35 years    Drug use: No    Sexual activity: Yes     Partners: Male     Birth control/protection: Surgical   Other Topics Concern    Not on file   Social History Narrative    Not on file     Social Determinants of Health     Financial Resource Strain:     Difficulty of Paying Living Expenses: Not on file   Food Insecurity:     Worried About Running Out of Food in the Last Year: Not on file    Shane of Food in the Last Year: Not on file   Transportation Needs:     Lack of Transportation (Medical): Not on file    Lack of Transportation (Non-Medical): Not on file   Physical Activity:     Days of Exercise per Week: Not on file    Minutes of Exercise per Session: Not on file   Stress:     Feeling of Stress : Not on file   Social Connections:     Frequency of Communication with Friends and Family: Not on file    Frequency of Social Gatherings with Friends and Family: Not on file    Attends Hindu Services: Not on file    Active Member of [address] [place] [address] or Organizations: Not on file    Attends Club or Organization Meetings: Not on file    Marital Status: Not on file   Intimate Partner Violence:     Fear of Current or Ex-Partner: Not on file    Emotionally Abused: Not on file    Physically Abused: Not on file    Sexually Abused: Not on file   Housing Stability:     Unable to Pay for Housing in the Last Year: Not on file    Number of [place] in the Last Year: Not on file    Unstable Housing in the Last Year: Not on file         ALLERGIES: Shellfish derived and Sulfa (sulfonamide antibiotics)    Review of Systems   Constitutional: Negative. Negative for activity change, chills, fatigue and unexpected weight change. HENT: Negative for trouble swallowing.     Respiratory: Negative for cough, chest tightness, shortness of breath and wheezing. Cardiovascular: Negative. Negative for chest pain and palpitations. Gastrointestinal: Positive for blood in stool. Negative for abdominal pain, diarrhea, nausea and vomiting. Genitourinary: Negative. Negative for dysuria, flank pain, frequency and hematuria. Musculoskeletal: Negative. Negative for arthralgias, back pain, neck pain and neck stiffness. Skin: Negative. Negative for color change and rash. Neurological: Negative. Negative for dizziness, numbness and headaches. All other systems reviewed and are negative. Vitals:    12/10/21 0852   BP: (!) 164/90   Pulse: 71   Resp: 18   SpO2: 98%   Weight: 70.8 kg (156 lb)   Height: 5' 5.5\" (1.664 m)            Physical Exam  Vitals and nursing note reviewed. Constitutional:       General: She is not in acute distress. Appearance: She is well-developed. She is not toxic-appearing or diaphoretic. HENT:      Head: Normocephalic and atraumatic. Eyes:      General:         Right eye: No discharge. Left eye: No discharge. Conjunctiva/sclera: Conjunctivae normal.      Pupils: Pupils are equal, round, and reactive to light. Neck:      Trachea: No tracheal tenderness. Cardiovascular:      Rate and Rhythm: Normal rate and regular rhythm. Pulses: Normal pulses. Heart sounds: Normal heart sounds. No murmur heard. No friction rub. No gallop. Pulmonary:      Effort: Pulmonary effort is normal. No respiratory distress. Breath sounds: Normal breath sounds. No wheezing or rales. Chest:      Chest wall: No tenderness. Abdominal:      General: Bowel sounds are normal. There is no distension. Palpations: Abdomen is soft. Tenderness: There is no abdominal tenderness. There is no guarding or rebound. Musculoskeletal:         General: No tenderness. Normal range of motion. Cervical back: Full passive range of motion without pain and normal range of motion.    Skin:     General: Skin is warm and dry. Capillary Refill: Capillary refill takes less than 2 seconds. Findings: No abrasion, erythema or rash. Neurological:      Mental Status: She is alert and oriented to person, place, and time. Cranial Nerves: No cranial nerve deficit. Sensory: No sensory deficit. Coordination: Coordination normal.   Psychiatric:         Speech: Speech normal.         Behavior: Behavior normal.          MDM  Number of Diagnoses or Management Options  Diagnosis management comments:   Ddx: GI bleed, diverticulitis, diverticular bleed       Amount and/or Complexity of Data Reviewed  Clinical lab tests: reviewed and ordered  Tests in the radiology section of CPT®: reviewed and ordered  Review and summarize past medical records: yes  Discuss the patient with other providers: yes    Patient Progress  Patient progress: stable         Procedures    I discussed patient's PMH, exam findings as well as careplan with the ER attending who agrees with care plan. Anum Ward PA-C     H/h stable, no tachycardia. Anum Ward PA-C    DISCHARGE NOTE:  12:13 PM  The patient has been re-evaluated and feeling much better and are stable for discharge. All available radiology and laboratory results have been reviewed with patient and/or available family. Patient and/or family verbally conveyed their understanding and agreement of the patient's signs, symptoms, diagnosis, treatment and prognosis and additionally agree to follow-up as recommended in the discharge instructions or to return to the Emergency Department should their condition change or worsen prior to their follow-up appointment. All questions have been answered and patient and/or available family express understanding.       LABORATORY RESULTS:  Recent Results (from the past 12 hour(s))   SAMPLES BEING HELD    Collection Time: 12/10/21  9:08 AM   Result Value Ref Range    SAMPLES BEING HELD 1SST HEMOLYZED     COMMENT        Add-on orders for these samples will be processed based on acceptable specimen integrity and analyte stability, which may vary by analyte. CBC WITH AUTOMATED DIFF    Collection Time: 12/10/21  9:08 AM   Result Value Ref Range    WBC 4.9 3.6 - 11.0 K/uL    RBC 4.89 3.80 - 5.20 M/uL    HGB 14.4 11.5 - 16.0 g/dL    HCT 40.5 35.0 - 47.0 %    MCV 82.8 80.0 - 99.0 FL    MCH 29.4 26.0 - 34.0 PG    MCHC 35.6 30.0 - 36.5 g/dL    RDW 12.4 11.5 - 14.5 %    PLATELET 212 357 - 815 K/uL    MPV 10.8 8.9 - 12.9 FL    NRBC 0.0 0  WBC    ABSOLUTE NRBC 0.00 0.00 - 0.01 K/uL    NEUTROPHILS 61 32 - 75 %    LYMPHOCYTES 29 12 - 49 %    MONOCYTES 7 5 - 13 %    EOSINOPHILS 3 0 - 7 %    BASOPHILS 0 0 - 1 %    IMMATURE GRANULOCYTES 0 0.0 - 0.5 %    ABS. NEUTROPHILS 3.0 1.8 - 8.0 K/UL    ABS. LYMPHOCYTES 1.4 0.8 - 3.5 K/UL    ABS. MONOCYTES 0.4 0.0 - 1.0 K/UL    ABS. EOSINOPHILS 0.1 0.0 - 0.4 K/UL    ABS. BASOPHILS 0.0 0.0 - 0.1 K/UL    ABS. IMM. GRANS. 0.0 0.00 - 0.04 K/UL    DF AUTOMATED     METABOLIC PANEL, COMPREHENSIVE    Collection Time: 12/10/21  9:08 AM   Result Value Ref Range    Sodium 138 136 - 145 mmol/L    Potassium 3.9 3.5 - 5.1 mmol/L    Chloride 103 97 - 108 mmol/L    CO2 33 (H) 21 - 32 mmol/L    Anion gap 2 (L) 5 - 15 mmol/L    Glucose 74 65 - 100 mg/dL    BUN 12 6 - 20 MG/DL    Creatinine 0.82 0.55 - 1.02 MG/DL    BUN/Creatinine ratio 15 12 - 20      GFR est AA >60 >60 ml/min/1.73m2    GFR est non-AA >60 >60 ml/min/1.73m2    Calcium 9.0 8.5 - 10.1 MG/DL    Bilirubin, total 0.5 0.2 - 1.0 MG/DL    ALT (SGPT) 16 12 - 78 U/L    AST (SGOT) 25 15 - 37 U/L    Alk.  phosphatase 51 45 - 117 U/L    Protein, total 6.8 6.4 - 8.2 g/dL    Albumin 3.5 3.5 - 5.0 g/dL    Globulin 3.3 2.0 - 4.0 g/dL    A-G Ratio 1.1 1.1 - 2.2     LIPASE    Collection Time: 12/10/21  9:08 AM   Result Value Ref Range    Lipase 145 73 - 393 U/L   TYPE & SCREEN    Collection Time: 12/10/21  9:08 AM   Result Value Ref Range    Crossmatch Expiration 12/13/2021,5814 ABO/Rh(D) A POSITIVE     Antibody screen NEG    URINALYSIS W/MICROSCOPIC    Collection Time: 12/10/21  9:08 AM   Result Value Ref Range    Color YELLOW/STRAW      Appearance CLEAR CLEAR      Specific gravity <1.005 1.003 - 1.030    pH (UA) 7.0 5.0 - 8.0      Protein Negative NEG mg/dL    Glucose Negative NEG mg/dL    Ketone Negative NEG mg/dL    Bilirubin Negative NEG      Blood TRACE (A) NEG      Urobilinogen 0.2 0.2 - 1.0 EU/dL    Nitrites Negative NEG      Leukocyte Esterase Negative NEG      WBC 0-4 0 - 4 /hpf    RBC 0-5 0 - 5 /hpf    Epithelial cells FEW FEW /lpf    Bacteria Negative NEG /hpf    Hyaline cast 0-2 0 - 5 /lpf   URINE CULTURE HOLD SAMPLE    Collection Time: 12/10/21  9:08 AM    Specimen: Serum; Urine   Result Value Ref Range    Urine culture hold        Urine on hold in Microbiology dept for 2 days. If unpreserved urine is submitted, it cannot be used for addtional testing after 24 hours, recollection will be required. IMAGING RESULTS:  CT ABD PELV W WO CONT    Result Date: 12/10/2021  1. No evidence of active GI hemorrhage. No evidence of acute process. 2. Unchanged small indeterminate lesion in segment 5 of the liver. Sclerosing hemangioma is favored. MEDICATIONS GIVEN:  Medications   sodium chloride 0.9 % bolus infusion 1,000 mL (1,000 mL IntraVENous New Bag 12/10/21 0950)   iopamidoL (ISOVUE-370) 76 % injection 100 mL (100 mL IntraVENous Given 12/10/21 1031)       IMPRESSION:  1. Diarrhea, unspecified type    2. Hematochezia    3. Liver lesion        PLAN:  Follow-up Information     Follow up With Specialties Details Why Contact Info    Sandy Myrick MD Gastroenterology Schedule an appointment as soon as possible for a visit  GI specialist for follow-up.  [address] [facility]  [phone]          Current Discharge Medication List

## 2021-12-10 NOTE — ED NOTES
5YR with hx of autoimmune hepatitis in remission since July, just tapered off steroids in November having 2-3 loose stools x 10 weeks. States intermittent BRB in stool, noticeable today. Denies ABD pain, F/C, vomiting. Was on abx \"for possible bacterial infection\", stopped taking after a few days. PCP and hepatologist have referred her to GI but GI cannot see her until January 16th.

## 2021-12-15 ENCOUNTER — TELEPHONE (OUTPATIENT)
Dept: HEMATOLOGY | Age: 66
End: 2021-12-15

## 2021-12-15 DIAGNOSIS — K75.4 AUTOIMMUNE HEPATITIS (HCC): Primary | ICD-10-CM

## 2021-12-15 NOTE — TELEPHONE ENCOUNTER
----- Message from Lauren Moore MD sent at 12/15/2021  8:56 AM EST -----  Kody myers,    Please help schedule above patient for Fibro Scan of the liver on next clinic visit. Thanks, [email or web address] Spoke w/patient concerning above message. Patient appt changed to Fibroscan on 2/3/22. Patient made aware NPO 4 hours prior and wear two piece clothing on that day. Request labs mailed for Jan/Feb 2022. Patient verbalize understanding.

## 2021-12-15 NOTE — TELEPHONE ENCOUNTER
I called Mrs Ash. He told me she has been having loose BM over the last couple of weeks and stools have been streaked with blood. She stopped taking Imuran all together. Liver enzymes have been stable. Since she is off imuran, She will like to monitor liver enzymes monthly. We will schedule her for Fibro scan of the liver on next clinic visit.     Stephany Mcconnell MD, MPH  Advanced Hepatology  [facility] of [address] [address] [address], [address] [place] [address] 22.  [phone]  [address] [place] [address]

## 2021-12-21 ENCOUNTER — ANCILLARY PROCEDURE (OUTPATIENT)
Dept: CARDIOLOGY CLINIC | Age: 66
End: 2021-12-21
Payer: MEDICARE

## 2021-12-21 VITALS
SYSTOLIC BLOOD PRESSURE: 140 MMHG | DIASTOLIC BLOOD PRESSURE: 70 MMHG | BODY MASS INDEX: 25.07 KG/M2 | WEIGHT: 156 LBS | HEIGHT: 66 IN

## 2021-12-21 DIAGNOSIS — I10 PRIMARY HYPERTENSION: ICD-10-CM

## 2021-12-21 DIAGNOSIS — I48.91 ATRIAL FIBRILLATION, UNSPECIFIED TYPE (HCC): ICD-10-CM

## 2021-12-21 DIAGNOSIS — I10 ESSENTIAL HYPERTENSION: ICD-10-CM

## 2021-12-21 PROCEDURE — 93306 TTE W/DOPPLER COMPLETE: CPT | Performed by: SPECIALIST

## 2022-01-01 LAB
ECHO AO ASC DIAM: 3.5 CM
ECHO AO ASCENDING AORTA INDEX: 1.96 CM/M2
ECHO AO ROOT DIAM: 3.2 CM
ECHO AO ROOT INDEX: 1.79 CM/M2
ECHO AV AREA PEAK VELOCITY: 3.1 CM2
ECHO AV AREA PEAK VELOCITY: 3.1 CM2
ECHO AV AREA VTI: 3.3 CM2
ECHO AV AREA VTI: 3.3 CM2
ECHO AV MEAN GRADIENT: 5 MMHG
ECHO AV MEAN VELOCITY: 1.1 M/S
ECHO AV PEAK GRADIENT: 10 MMHG
ECHO AV PEAK VELOCITY: 1.6 M/S
ECHO AV VELOCITY RATIO: 0.88
ECHO AV VTI: 29.5 CM
ECHO EST RA PRESSURE: 8 MMHG
ECHO LA DIAMETER INDEX: 2.12 CM/M2
ECHO LA DIAMETER: 3.8 CM
ECHO LA TO AORTIC ROOT RATIO: 1.19
ECHO LA VOL 2C: 69 ML (ref 22–52)
ECHO LA VOL 4C: 65 ML (ref 22–52)
ECHO LA VOLUME AREA LENGTH: 77 ML
ECHO LA VOLUME INDEX A2C: 39 ML/M2 (ref 16–34)
ECHO LA VOLUME INDEX A4C: 36 ML/M2 (ref 16–34)
ECHO LA VOLUME INDEX AREA LENGTH: 43 ML/M2 (ref 16–34)
ECHO LV E' LATERAL VELOCITY: 7 CM/S
ECHO LV E' SEPTAL VELOCITY: 4 CM/S
ECHO LV EDV A2C: 135 ML
ECHO LV EDV A4C: 129 ML
ECHO LV EDV BP: 132 ML (ref 56–104)
ECHO LV EDV INDEX A4C: 72 ML/M2
ECHO LV EDV INDEX BP: 74 ML/M2
ECHO LV EDV NDEX A2C: 75 ML/M2
ECHO LV EJECTION FRACTION A2C: 50 %
ECHO LV EJECTION FRACTION A4C: 56 %
ECHO LV EJECTION FRACTION BIPLANE: 53 % (ref 55–100)
ECHO LV ESV A2C: 68 ML
ECHO LV ESV A4C: 57 ML
ECHO LV ESV BP: 63 ML (ref 19–49)
ECHO LV ESV INDEX A2C: 38 ML/M2
ECHO LV ESV INDEX A4C: 32 ML/M2
ECHO LV ESV INDEX BP: 35 ML/M2
ECHO LV FRACTIONAL SHORTENING: 29 % (ref 28–44)
ECHO LV INTERNAL DIMENSION DIASTOLE INDEX: 2.74 CM/M2
ECHO LV INTERNAL DIMENSION DIASTOLIC: 4.9 CM (ref 3.9–5.3)
ECHO LV INTERNAL DIMENSION SYSTOLIC INDEX: 1.96 CM/M2
ECHO LV INTERNAL DIMENSION SYSTOLIC: 3.5 CM
ECHO LV IVSD: 0.8 CM (ref 0.6–0.9)
ECHO LV MASS 2D: 131.2 G (ref 67–162)
ECHO LV MASS INDEX 2D: 73.3 G/M2 (ref 43–95)
ECHO LV POSTERIOR WALL DIASTOLIC: 0.8 CM (ref 0.6–0.9)
ECHO LV RELATIVE WALL THICKNESS RATIO: 0.33
ECHO LVOT AREA: 3.8 CM2
ECHO LVOT AV VTI INDEX: 0.91
ECHO LVOT DIAM: 2.2 CM
ECHO LVOT MEAN GRADIENT: 4 MMHG
ECHO LVOT PEAK GRADIENT: 7 MMHG
ECHO LVOT PEAK VELOCITY: 1.4 M/S
ECHO LVOT STROKE VOLUME INDEX: 57.1 ML/M2
ECHO LVOT SV: 102.2 ML
ECHO LVOT VTI: 26.9 CM
ECHO MV A VELOCITY: 0.85 M/S
ECHO MV AREA PHT: 3.6 CM2
ECHO MV AREA VTI: 4.6 CM2
ECHO MV E DECELERATION TIME (DT): 211.9 MS
ECHO MV E VELOCITY: 0.63 M/S
ECHO MV E/A RATIO: 0.74
ECHO MV E/E' LATERAL: 9
ECHO MV E/E' RATIO (AVERAGED): 12.38
ECHO MV E/E' SEPTAL: 15.75
ECHO MV LVOT VTI INDEX: 0.83
ECHO MV MAX VELOCITY: 0.9 M/S
ECHO MV MEAN GRADIENT: 2 MMHG
ECHO MV MEAN VELOCITY: 0.6 M/S
ECHO MV PEAK GRADIENT: 4 MMHG
ECHO MV PRESSURE HALF TIME (PHT): 61.5 MS
ECHO MV VTI: 22.3 CM
ECHO RIGHT VENTRICULAR SYSTOLIC PRESSURE (RVSP): 22 MMHG
ECHO RV INTERNAL DIMENSION: 4 CM
ECHO RV TAPSE: 2.5 CM (ref 1.5–2)
ECHO TV REGURGITANT MAX VELOCITY: 1.85 M/S
ECHO TV REGURGITANT PEAK GRADIENT: 14 MMHG

## 2022-01-01 PROCEDURE — 93306 TTE W/DOPPLER COMPLETE: CPT | Performed by: SPECIALIST

## 2022-01-07 LAB
ALBUMIN SERPL-MCNC: 4.1 G/DL (ref 3.8–4.8)
ALBUMIN/GLOB SERPL: 2.2 {RATIO} (ref 1.2–2.2)
ALP SERPL-CCNC: 52 IU/L (ref 44–121)
ALT SERPL-CCNC: 12 IU/L (ref 0–32)
AST SERPL-CCNC: 19 IU/L (ref 0–40)
BASOPHILS # BLD AUTO: 0 X10E3/UL (ref 0–0.2)
BASOPHILS NFR BLD AUTO: 1 %
BILIRUB DIRECT SERPL-MCNC: 0.11 MG/DL (ref 0–0.4)
BILIRUB SERPL-MCNC: 0.3 MG/DL (ref 0–1.2)
BUN SERPL-MCNC: 10 MG/DL (ref 8–27)
BUN/CREAT SERPL: 15 (ref 12–28)
CALCIUM SERPL-MCNC: 9.1 MG/DL (ref 8.7–10.3)
CHLORIDE SERPL-SCNC: 96 MMOL/L (ref 96–106)
CO2 SERPL-SCNC: 29 MMOL/L (ref 20–29)
CREAT SERPL-MCNC: 0.68 MG/DL (ref 0.57–1)
EOSINOPHIL # BLD AUTO: 0.2 X10E3/UL (ref 0–0.4)
EOSINOPHIL NFR BLD AUTO: 4 %
ERYTHROCYTE [DISTWIDTH] IN BLOOD BY AUTOMATED COUNT: 13.4 % (ref 11.7–15.4)
GLOBULIN SER CALC-MCNC: 1.9 G/DL (ref 1.5–4.5)
GLUCOSE SERPL-MCNC: 79 MG/DL (ref 65–99)
HCT VFR BLD AUTO: 41.5 % (ref 34–46.6)
HGB BLD-MCNC: 13.5 G/DL (ref 11.1–15.9)
IMM GRANULOCYTES # BLD AUTO: 0 X10E3/UL (ref 0–0.1)
IMM GRANULOCYTES NFR BLD AUTO: 0 %
LYMPHOCYTES # BLD AUTO: 1.6 X10E3/UL (ref 0.7–3.1)
LYMPHOCYTES NFR BLD AUTO: 37 %
MCH RBC QN AUTO: 28.5 PG (ref 26.6–33)
MCHC RBC AUTO-ENTMCNC: 32.5 G/DL (ref 31.5–35.7)
MCV RBC AUTO: 88 FL (ref 79–97)
MONOCYTES # BLD AUTO: 0.4 X10E3/UL (ref 0.1–0.9)
MONOCYTES NFR BLD AUTO: 9 %
NEUTROPHILS # BLD AUTO: 2.1 X10E3/UL (ref 1.4–7)
NEUTROPHILS NFR BLD AUTO: 49 %
PLATELET # BLD AUTO: 172 X10E3/UL (ref 150–450)
POTASSIUM SERPL-SCNC: 4.1 MMOL/L (ref 3.5–5.2)
PROT SERPL-MCNC: 6 G/DL (ref 6–8.5)
RBC # BLD AUTO: 4.74 X10E6/UL (ref 3.77–5.28)
SODIUM SERPL-SCNC: 136 MMOL/L (ref 134–144)
WBC # BLD AUTO: 4.2 X10E3/UL (ref 3.4–10.8)

## 2022-01-12 ENCOUNTER — TELEPHONE (OUTPATIENT)
Dept: HEMATOLOGY | Age: 67
End: 2022-01-12

## 2022-01-12 NOTE — TELEPHONE ENCOUNTER
Notified patient that Dr. Christopher Choudhary is no longer with practice. Patient's appointment with provider will be cancelled. Patient will be contacted at a later date to reschedule. Patient understood information.

## 2022-01-25 ENCOUNTER — OFFICE VISIT (OUTPATIENT)
Dept: CARDIOLOGY CLINIC | Age: 67
End: 2022-01-25
Payer: MEDICARE

## 2022-01-25 VITALS
OXYGEN SATURATION: 98 % | HEART RATE: 65 BPM | HEIGHT: 66 IN | BODY MASS INDEX: 24.75 KG/M2 | DIASTOLIC BLOOD PRESSURE: 80 MMHG | WEIGHT: 154 LBS | SYSTOLIC BLOOD PRESSURE: 116 MMHG

## 2022-01-25 DIAGNOSIS — I48.91 ATRIAL FIBRILLATION, UNSPECIFIED TYPE (HCC): Primary | ICD-10-CM

## 2022-01-25 DIAGNOSIS — I10 ESSENTIAL HYPERTENSION: ICD-10-CM

## 2022-01-25 PROCEDURE — G8400 PT W/DXA NO RESULTS DOC: HCPCS | Performed by: SPECIALIST

## 2022-01-25 PROCEDURE — G8754 DIAS BP LESS 90: HCPCS | Performed by: SPECIALIST

## 2022-01-25 PROCEDURE — 1090F PRES/ABSN URINE INCON ASSESS: CPT | Performed by: SPECIALIST

## 2022-01-25 PROCEDURE — 1101F PT FALLS ASSESS-DOCD LE1/YR: CPT | Performed by: SPECIALIST

## 2022-01-25 PROCEDURE — G9899 SCRN MAM PERF RSLTS DOC: HCPCS | Performed by: SPECIALIST

## 2022-01-25 PROCEDURE — G8419 CALC BMI OUT NRM PARAM NOF/U: HCPCS | Performed by: SPECIALIST

## 2022-01-25 PROCEDURE — G8427 DOCREV CUR MEDS BY ELIG CLIN: HCPCS | Performed by: SPECIALIST

## 2022-01-25 PROCEDURE — G8536 NO DOC ELDER MAL SCRN: HCPCS | Performed by: SPECIALIST

## 2022-01-25 PROCEDURE — 3017F COLORECTAL CA SCREEN DOC REV: CPT | Performed by: SPECIALIST

## 2022-01-25 PROCEDURE — 99214 OFFICE O/P EST MOD 30 MIN: CPT | Performed by: SPECIALIST

## 2022-01-25 PROCEDURE — G8752 SYS BP LESS 140: HCPCS | Performed by: SPECIALIST

## 2022-01-25 PROCEDURE — G0463 HOSPITAL OUTPT CLINIC VISIT: HCPCS | Performed by: SPECIALIST

## 2022-01-25 PROCEDURE — G8432 DEP SCR NOT DOC, RNG: HCPCS | Performed by: SPECIALIST

## 2022-01-25 NOTE — PROGRESS NOTES
CARDIOLOGY OFFICE NOTE    Az Dinero MD, [address], [place], [address] [facility]  Phone [phone]; Fax [phone]  Mobile [phone]   Voice Mail [phone]    Primary care: Siva Lynn MD       ATTENTION:   This medical record was transcribed using an electronic medical records/speech recognition system. Although proofread, it may and can contain electronic, spelling and other errors. Corrections may be executed at a later time. Please feel free to contact us for any clarifications as needed. ICD-10-CM ICD-9-CM   1. Atrial fibrillation, unspecified type ([facility] [address]  I48.91 427.31   2. Essential hypertension  I10 401.9            Roc Scott is a 77 y.o. female with  referred for abnormal ECG          Cardiac risk factors: family history, hypertension, post-menopausal  I have personally obtained the history from the patient. HISTORY OF PRESENTING ILLNESS      Roc Scott is a 78 yo female who I saw in the hospital during the autoimmune hepatitis. At the time they check an echo on her EF is 45 to 50%. Her ejection fraction had improved up to 53%. She has no symptoms of heart failure no PND or orthopnea.   She is happy to some of the rectal bleeding apparently she came off of her Imuran but she is not taking it currently.              ACTIVE PROBLEM LIST     Patient Active Problem List    Diagnosis Date Noted    Autoimmune hepatitis ([facility] [address] 11/27/2020    Hypertension            PAST MEDICAL HISTORY     Past Medical History:   Diagnosis Date    Bilateral dry eyes     Herpes simplex     Includes herpes labialis    History of mammogram 10/13/2016; 11/13/18; 01/03/2020    negative; Negative; negative birads 1 dense    Hx of mammogram 09/30/14; 10/1/15    normal; negative    Hx of mammogram 01/15/2021    BI-RADS 1: Negative    Hypertension     Pap smear for cervical cancer screening 1/15/21, 9/14/10; 9/18/12; 10/1/15; 11/13/18 Negative, HPV negative; Negative; Negative, HPV negative; Neg/Neg HPV           PAST SURGICAL HISTORY     Past Surgical History:   Procedure Laterality Date    COLONOSCOPY N/A 12/5/2018    COLONOSCOPY performed by Rema Goyal MD at [facility] HX COLONOSCOPY  2006    Wnl    HX GYN  1982    tubal ligation    TN LAP,TUBAL CAUTERY            ALLERGIES     Allergies   Allergen Reactions    Shellfish Derived Rash    Sulfa (Sulfonamide Antibiotics) Unknown (comments)     Pt denies          FAMILY HISTORY     Family History   Problem Relation Age of Onset    Hypertension Mother     OSTEOARTHRITIS Mother     Osteoporosis Mother     Cancer Mother 80        lymphoma    Stroke Mother     Hypertension Father     Stroke Father     Hypertension Sister     OSTEOARTHRITIS Sister     Cancer Sister     Hypertension Brother     Cancer Maternal Uncle         colon    negative for cardiac disease       SOCIAL HISTORY     Social History     Socioeconomic History    Marital status:    Tobacco Use    Smoking status: Never Smoker    Smokeless tobacco: Never Used   Substance and Sexual Activity    Alcohol use: Not Currently     Alcohol/week: 2.0 standard drinks     Types: 2 Glasses of wine per week     Comment: previously drank 2 glasses of wine for 35 years    Drug use: No    Sexual activity: Yes     Partners: Male     Birth control/protection: Surgical         MEDICATIONS     Current Outpatient Medications   Medication Sig    losartan (COZAAR) 25 mg tablet 12.5 mg.    zinc gluconate 30 mg tab Take 30 mg by mouth every other day.  L.acid/L.casei/B.bif/B.deyanira/FOS (PROBIOTIC BLEND PO) Take  by mouth.  omega-3-dha-epa-fish oil (Omega-3 Fish Oil) 910-1,400 mg cap Take  by mouth.  turmeric/turmeric ext/pepr ext (turmeric-turmeric ext-pepper) 500-3 mg cap Take  by mouth.     folic acid (FOLVITE) 1 mg tablet     calcium carbonate (CALTREX) 600 mg calcium (1,500 mg) tablet Take 600 mg by mouth two (2) times a day.  cholecalciferol (VITAMIN D3) (2,000 UNITS /50 MCG) cap capsule Take  by mouth two (2) times a day.  multivitamin (ONE A DAY) tablet Take 1 Tab by mouth daily.  azaTHIOprine (IMURAN) 50 mg tablet Take 2 Tabs by mouth daily. Indications: liver inflammation resulting from an abnormal immune response (Patient not taking: Reported on 1/25/2022)     No current facility-administered medications for this visit. I have reviewed the nurses notes, vitals, problem list, allergy list, medical history, family, social history and medications. REVIEW OF SYMPTOMS   Pertinent positives per HPI  General: Pt denies excessive weight gain or loss. Pt is able to conduct ADL's  HEENT: Denies blurred vision, headaches, hearing loss, epistaxis and difficulty swallowing. Respiratory: Denies cough, congestion, shortness of breath, MEREDITH, wheezing or stridor. Cardiovascular: Denies precordial pain, palpitations, edema or PND  Gastrointestinal: Denies poor appetite, indigestion, abdominal pain or blood in stool  Genitourinary: Denies hematuria, dysuria, increased urinary frequency  Musculoskeletal: Denies joint pain or swelling from muscles or joints  Neurologic: Denies tremor, paresthesias, headache, or sensory motor disturbance  Psychiatric: Denies confusion, insomnia, depression  Integumentray: Denies rash, itching or ulcers. Hematologic: Denies easy bruising, bleeding     PHYSICAL EXAMINATION      Vitals:    01/25/22 1345   BP: 116/80   Pulse: 65   SpO2: 98%   Weight: 154 lb (69.9 kg)   Height: 5' 5.5\" (1.664 m)     General: Well developed, in no acute distress. HEENT: No jaundice, oral mucosa moist, no oral ulcers  Neck: Supple, no stiffness, no lymphadenopathy, supple  Heart:  Normal S1/S2 negative S3 or S4.  Regular, no murmur, gallop or rub, no jugular venous distention  Respiratory: Clear bilaterally x 4, no wheezing or rales  Extremities:  No edema, normal cap refill, no cyanosis. Musculoskeletal: No clubbing, no deformities  Neuro: A&Ox3, speech clear, gait stable, cooperative, no focal neurologic deficits  Skin: Skin color is normal. No rashes or lesions. Non diaphoretic, moist.          EK2021 normal sinus rhythm with left bundle branch block     DIAGNOSTIC DATA     1. Echo  10/22/20-EF 45 - 50%, mild AI/MR/PI, IVC: Moderately elevated central venous pressure (10-15 mmHg); IVC diameter is larger than 21 mm and collapses more than 50% with respiration. 21- EF 53%, LAE, mild AI, RVSP 22 mmHg,  Mildly dilated ascending aorta (3.5 cm). Ao Ascending Index is 1.96 cm/m2. 2. Lipids  11/3/21- , HDL 96, .4, TG 58         LABORATORY DATA            Lab Results   Component Value Date/Time    WBC 4.2 2022 12:13 PM    HGB 13.5 2022 12:13 PM    HCT 41.5 2022 12:13 PM    PLATELET 491  12:13 PM    MCV 88 2022 12:13 PM      Lab Results   Component Value Date/Time    Sodium 136 2022 12:13 PM    Potassium 4.1 2022 12:13 PM    Chloride 96 2022 12:13 PM    CO2 29 2022 12:13 PM    Anion gap 2 (L) 12/10/2021 09:08 AM    Glucose 79 2022 12:13 PM    BUN 10 2022 12:13 PM    Creatinine 0.68 2022 12:13 PM    BUN/Creatinine ratio 15 2022 12:13 PM    GFR est  2022 12:13 PM    GFR est non-AA 91 2022 12:13 PM    Calcium 9.1 2022 12:13 PM    Bilirubin, total 0.3 2022 12:13 PM    Alk. phosphatase 52 2022 12:13 PM    Protein, total 6.0 2022 12:13 PM    Albumin 4.1 2022 12:13 PM    Globulin 3.3 12/10/2021 09:08 AM    A-G Ratio 2.2 2022 12:13 PM    ALT (SGPT) 12 2022 12:13 PM           ASSESSMENT/RECOMMENDATIONS:.      1. Mild cardiomyopathy  -Ejection fraction appears to be slightly better 53%. We will consider repeating her echo in the next year or 2. She has no symptoms of heart failure NYHA class I  2.  Hypertension  -Blood pressure is good today on current medical regimen no adjustments antihypertensives  -Encourage low-sodium diet  3. Mild AI  -We will follow with serial echoes  4. Mildly elevated LDL  -Her HDL is excellent is excellent in the 90s  -Could consider calcium scoring and gave her the brochure to call should she be interested  4. Cardiac preoperative risk ratification for noncardiac surgery  -I believe she is a low cardiac operative risk and may proceed with her colonoscopy February 4 no additional cardiac testing is indicated. 5.  Return in 6 months or as needed    No orders of the defined types were placed in this encounter. We discussed the expected course, resolution and complications of the diagnosis(es) in detail. Medication risks, benefits, costs, interactions, and alternatives were discussed as indicated. I advised him to contact the office if his condition worsens, changes or fails to improve as anticipated. He expressed understanding with the diagnosis(es) and plan          Follow-up and Dispositions  ·   Return in about 6 months (around 7/25/2022). I have discussed the diagnosis with  Jairon Dolan and the intended plan as seen in the above orders. Questions were answered concerning future plans. I have discussed medication side effects and warnings with the patient as well. Thank you,  Lewis Tarango, Philly Balderas MD for involving me in the care of  Jairon Dolan. Please do not hesitate to contact me for further questions/concerns. Az Sethi MD, [address] [place] [address] Rd[address]      [facility], [address] [place] [address], [facility]. Matteo Mead.      [phone] / [phone] Fax

## 2022-01-25 NOTE — LETTER
1/25/2022    Patient: Jason Cruz   YOB: 1955   Date of Visit: 1/25/2022     Mame Morel MD  [address] [address]  Via Fax: [phone]    Dear Mame Morel MD,      Thank you for referring Ms. Jairon Dolan to [facility] for evaluation. My notes for this consultation are attached. If you have questions, please do not hesitate to call me. I look forward to following your patient along with you.       Sincerely,    Lois Gamez MD

## 2022-01-25 NOTE — PROGRESS NOTES
Maribeth Valero is a 77 y.o. female    Visit Vitals  /80 (BP 1 Location: Left upper arm, BP Patient Position: Sitting, BP Cuff Size: Adult)   Pulse 65   Ht 5' 5.5\" (1.664 m)   Wt 154 lb (69.9 kg)   SpO2 98%   BMI 25.24 kg/m²       Chief Complaint   Patient presents with    Cardiomyopathy    Hypertension    Other     AI       Chest pain NO  SOB NO  Dizziness NO  Swelling NO  Recent hospital visit NO  Refills NO  COVID VACCINE STATUS YES  HAD COVID?  NO

## 2022-02-12 LAB
ALBUMIN SERPL-MCNC: 4.1 G/DL (ref 3.8–4.8)
ALBUMIN/GLOB SERPL: 2 {RATIO} (ref 1.2–2.2)
ALP SERPL-CCNC: 55 IU/L (ref 44–121)
ALT SERPL-CCNC: 10 IU/L (ref 0–32)
AST SERPL-CCNC: 20 IU/L (ref 0–40)
BASOPHILS # BLD AUTO: 0 X10E3/UL (ref 0–0.2)
BASOPHILS NFR BLD AUTO: 1 %
BILIRUB DIRECT SERPL-MCNC: 0.11 MG/DL (ref 0–0.4)
BILIRUB SERPL-MCNC: 0.3 MG/DL (ref 0–1.2)
BUN SERPL-MCNC: 17 MG/DL (ref 8–27)
BUN/CREAT SERPL: 24 (ref 12–28)
CALCIUM SERPL-MCNC: 9.1 MG/DL (ref 8.7–10.3)
CHLORIDE SERPL-SCNC: 97 MMOL/L (ref 96–106)
CO2 SERPL-SCNC: 25 MMOL/L (ref 20–29)
CREAT SERPL-MCNC: 0.72 MG/DL (ref 0.57–1)
EOSINOPHIL # BLD AUTO: 0.2 X10E3/UL (ref 0–0.4)
EOSINOPHIL NFR BLD AUTO: 4 %
ERYTHROCYTE [DISTWIDTH] IN BLOOD BY AUTOMATED COUNT: 14.2 % (ref 11.7–15.4)
GLOBULIN SER CALC-MCNC: 2.1 G/DL (ref 1.5–4.5)
GLUCOSE SERPL-MCNC: 80 MG/DL (ref 65–99)
HCT VFR BLD AUTO: 38.4 % (ref 34–46.6)
HGB BLD-MCNC: 12.8 G/DL (ref 11.1–15.9)
IMM GRANULOCYTES # BLD AUTO: 0 X10E3/UL (ref 0–0.1)
IMM GRANULOCYTES NFR BLD AUTO: 0 %
LYMPHOCYTES # BLD AUTO: 1.7 X10E3/UL (ref 0.7–3.1)
LYMPHOCYTES NFR BLD AUTO: 37 %
MCH RBC QN AUTO: 29.1 PG (ref 26.6–33)
MCHC RBC AUTO-ENTMCNC: 33.3 G/DL (ref 31.5–35.7)
MCV RBC AUTO: 87 FL (ref 79–97)
MONOCYTES # BLD AUTO: 0.5 X10E3/UL (ref 0.1–0.9)
MONOCYTES NFR BLD AUTO: 10 %
NEUTROPHILS # BLD AUTO: 2.2 X10E3/UL (ref 1.4–7)
NEUTROPHILS NFR BLD AUTO: 48 %
PLATELET # BLD AUTO: 176 X10E3/UL (ref 150–450)
POTASSIUM SERPL-SCNC: 4.3 MMOL/L (ref 3.5–5.2)
PROT SERPL-MCNC: 6.2 G/DL (ref 6–8.5)
RBC # BLD AUTO: 4.4 X10E6/UL (ref 3.77–5.28)
SODIUM SERPL-SCNC: 136 MMOL/L (ref 134–144)
WBC # BLD AUTO: 4.6 X10E3/UL (ref 3.4–10.8)

## 2022-02-15 ENCOUNTER — PATIENT MESSAGE (OUTPATIENT)
Dept: OBGYN CLINIC | Age: 67
End: 2022-02-15

## 2022-02-15 NOTE — PATIENT INSTRUCTIONS
Well Visit, Over 72: Care Instructions  Overview     Well visits can help you stay healthy. Your doctor has checked your overall health and may have suggested ways to take good care of yourself. Your doctor also may have recommended tests. At home, you can help prevent illness with healthy eating, regular exercise, and other steps. Follow-up care is a key part of your treatment and safety. Be sure to make and go to all appointments, and call your doctor if you are having problems. It's also a good idea to know your test results and keep a list of the medicines you take. How can you care for yourself at home? · Get screening tests that you and your doctor decide on. Screening helps find diseases before any symptoms appear. · Eat healthy foods. Choose fruits, vegetables, whole grains, protein, and low-fat dairy foods. Limit fat, especially saturated fat. Reduce salt in your diet. · Limit alcohol. If you are a man, have no more than 2 drinks a day or 14 drinks a week. If you are a woman, have no more than 1 drink a day or 7 drinks a week. Since alcohol affects older adults differently, you may want to limit alcohol even more. Or you may not want to drink at all. · Get at least 30 minutes of exercise on most days of the week. Walking is a good choice. You also may want to do other activities, such as running, swimming, cycling, or playing tennis or team sports. · Reach and stay at a healthy weight. This will lower your risk for many problems, such as obesity, diabetes, heart disease, and high blood pressure. · Do not smoke. Smoking can make health problems worse. If you need help quitting, talk to your doctor about stop-smoking programs and medicines. These can increase your chances of quitting for good. · Care for your mental health. It is easy to get weighed down by worry and stress. Learn strategies to manage stress, like deep breathing and mindfulness, and stay connected with your family and community. If you find you often feel sad or hopeless, talk with your doctor. Treatment can help. · Talk to your doctor about whether you have any risk factors for sexually transmitted infections (STIs). You can help prevent STIs if you wait to have sex with a new partner (or partners) until you've each been tested for STIs. It also helps if you use condoms (male or female condoms) and if you limit your sex partners to one person who only has sex with you. Vaccines are available for some STIs. · If you think you may have a problem with alcohol or drug use, talk to your doctor. This includes prescription medicines (such as amphetamines and opioids) and illegal drugs (such as cocaine and methamphetamine). Your doctor can help you figure out what type of treatment is best for you. · Protect your skin from too much sun. When you're outdoors from 10 a.m. to 4 p.m., stay in the shade or cover up with clothing and a hat with a wide brim. Wear sunglasses that block UV rays. Even when it's cloudy, put broad-spectrum sunscreen (SPF 30 or higher) on any exposed skin. · See a dentist one or two times a year for checkups and to have your teeth cleaned. · Wear a seat belt in the car. When should you call for help? Watch closely for changes in your health, and be sure to contact your doctor if you have any problems or symptoms that concern you. Where can you learn more? Go to http://emma-renetta.info/  Enter [id number] in the search box to learn more about \"Well Visit, Over 65: Care Instructions. \"  Current as of: February 11, 2021               Content Version: 13.0  © 0978-7017 [employer]. Care instructions adapted under license by [employer] (which disclaims liability or warranty for this information).  If you have questions about a medical condition or this instruction, always ask your healthcare professional. [address] any warranty or liability for your use of this information.

## 2022-02-15 NOTE — PROGRESS NOTES
[facility]  [email or web address]  [phone]    Chasity Meza MD, [address] [facility]       Annual Gynecologic Exam:   WWE 60+  Chief Complaint   Patient presents with    Well Woman    Mammogram         Anastacio Hidalgo is a 77 y.o. No obstetric history on file. OTHER  female who presents for an annual exam.    She reports additional concerns: doing well, no concerns. Denies concerns for infection. ABN signed. Sexual history and Contraception:  Social History     Substance and Sexual Activity   Sexual Activity Yes    Partners: Male    Birth control/protection: Surgical       Preventive Medicine History:  Her most recent Pap smear result: normal was obtained in January 2021  Her most recent HR HPV screen was Negative obtained in 2021    She does not have a history of STEVEN 2, 3 or cervical cancer. Breast health:  Last mammogram: approximate date 1/2021 and was normal.   Breast cancer family updated: see FH. Bone health: Humera Mccain She has not had a bone density scan in the past.   She does not have a history of osteopenia/osteoporosis. Osteoporosis family history updated: see .      Past Medical History:   Diagnosis Date    Bilateral dry eyes     Herpes simplex     Includes herpes labialis    History of mammogram 10/13/2016; 11/13/18; 01/03/2020    negative; Negative; negative birads 1 dense    Hx of mammogram 09/30/14; 10/1/15    normal; negative    Hx of mammogram 01/15/2021    BI-RADS 1: Negative    Hypertension     Pap smear for cervical cancer screening 1/15/21, 9/14/10; 9/18/12; 10/1/15; 11/13/18    Negative, HPV negative; Negative; Negative, HPV negative; Neg/Neg HPV     Past Surgical History:   Procedure Laterality Date    COLONOSCOPY N/A 12/5/2018    COLONOSCOPY performed by Josh Shelton MD at [facility] HX COLONOSCOPY  2006    Wnl    HX GYN  1982    tubal ligation    RI LAP,TUBAL CAUTERY       Family History   Problem Relation Age of Onset    Hypertension Mother     OSTEOARTHRITIS Mother     Osteoporosis Mother     Cancer Mother 80        lymphoma    Stroke Mother     Hypertension Father     Stroke Father     Hypertension Sister     OSTEOARTHRITIS Sister     Cancer Sister     Hypertension Brother     Cancer Maternal Uncle         colon     Social History     Socioeconomic History    Marital status:      Spouse name: Not on file    Number of children: Not on file    Years of education: Not on file    Highest education level: Not on file   Occupational History    Not on file   Tobacco Use    Smoking status: Never Smoker    Smokeless tobacco: Never Used   Vaping Use    Vaping Use: Never used   Substance and Sexual Activity    Alcohol use: Not Currently    Drug use: No    Sexual activity: Yes     Partners: Male     Birth control/protection: Surgical   Other Topics Concern    Not on file   Social History Narrative    Not on file     Social Determinants of Health     Financial Resource Strain:     Difficulty of Paying Living Expenses: Not on file   Food Insecurity:     Worried About [employer] [employer] in the Last Year: Not on file    [address] [employer] in the Last Year: Not on file   Transportation Needs:     Lack of Transportation (Medical): Not on file    Lack of Transportation (Non-Medical):  Not on file   Physical Activity:     Days of Exercise per Week: Not on file    Minutes of Exercise per Session: Not on file   Stress:     Feeling of Stress : Not on file   Social Connections:     Frequency of Communication with Friends and Family: Not on file    Frequency of Social Gatherings with Friends and Family: Not on file    Attends Jehovah's witness Services: Not on file    Active Member of Clubs or Organizations: Not on file    Attends Club or Organization Meetings: Not on file    Marital Status: Not on file   Intimate Partner Violence:     Fear of Current or Ex-Partner: Not on file    Emotionally Abused: Not on file   Stephen Reveles Physically Abused: Not on file    Sexually Abused: Not on file   Housing Stability:     Unable to Pay for Housing in the Last Year: Not on file    Number of Places Lived in the Last Year: Not on file    Unstable Housing in the Last Year: Not on file       Allergies   Allergen Reactions    Shellfish Derived Rash    Sulfa (Sulfonamide Antibiotics) Unknown (comments)     Pt denies       Current Outpatient Medications   Medication Sig    magnesium 250 mg tab Take  by mouth.  losartan (COZAAR) 25 mg tablet 12.5 mg.    zinc gluconate 30 mg tab Take 30 mg by mouth every other day.  L.acid/L.casei/B.bif/B.deyanira/FOS (PROBIOTIC BLEND PO) Take  by mouth.  omega-3-dha-epa-fish oil (Omega-3 Fish Oil) 910-1,400 mg cap Take  by mouth.  turmeric/turmeric ext/pepr ext (turmeric-turmeric ext-pepper) 500-3 mg cap Take  by mouth.  folic acid (FOLVITE) 1 mg tablet     calcium carbonate (CALTREX) 600 mg calcium (1,500 mg) tablet Take 600 mg by mouth two (2) times a day.  cholecalciferol (VITAMIN D3) (2,000 UNITS /50 MCG) cap capsule Take  by mouth two (2) times a day.  multivitamin (ONE A DAY) tablet Take 1 Tab by mouth daily. No current facility-administered medications for this visit.        Patient Active Problem List   Diagnosis Code    Hypertension I10    Autoimmune hepatitis ([facility] [address] K75.4       Review of Systems - History obtained from the patient and patient filled out questionnaire   Constitutional/general, HEENT, CV, Resp, GI, MSK, Neuro, Psych, Heme/lymph, Skin, Breast ROS: no significant complaints except as noted on HPI      Physical Exam  Visit Vitals  /72   Pulse 66   Ht 5' 5.5\" (1.664 m)   Wt 167 lb 6.4 oz (75.9 kg)   BMI 27.43 kg/m²       Constitutional  · Appearance: well-nourished, well developed, alert, in no acute distress    HENT  · Head and Face: appears normal    Neck  · Inspection/Palpation: normal appearance, no masses or tenderness  · Lymph Nodes: no lymphadenopathy present  · Thyroid: gland size normal, nontender, no nodules or masses present on palpation    Chest  · Respiratory Effort: breathing unlabored  · Auscultation: normal breath sounds    Cardiovascular  · Heart:  · Auscultation: regular rate and rhythm without murmur    Breasts  · Inspection of Breasts: breasts symmetrical, no skin changes, no discharge present, nipple appearance normal, no skin retraction present  · Palpation of Breasts and Axillae: no masses present on palpation, no breast tenderness  · Axillary Lymph Nodes: no lymphadenopathy present    Gastrointestinal  · Abdominal Examination: abdomen non-tender to palpation, normal bowel sounds, no masses present  · Liver and spleen: no hepatomegaly present, spleen not palpable  · Hernias: no hernias identified    Genitourinary  · External Genitalia: normal appearance for age, no discharge present, no tenderness present, no inflammatory lesions present, no masses present, with atrophy present  · Vagina: normal vaginal vault without central or paravaginal defects, no discharge present, no inflammatory lesions present, no masses present  · Bladder: non-tender to palpation  · Urethra: appears normal  · Cervix: normal   · Uterus: normal size, shape and consistency  · Adnexa: no adnexal tenderness present, no adnexal masses present  · Perineum: perineum within normal limits, no evidence of trauma, no rashes or skin lesions present  · Anus: anus within normal limits, no hemorrhoids present  · Inguinal Lymph Nodes: no lymphadenopathy present    Skin  · General Inspection: no rash, no lesions identified    Neurologic/Psychiatric  · Mental Status:  · Orientation: grossly oriented to person, place and time  · Mood and Affect: mood normal, affect appropriate    Assessment:  77 y.o. No obstetric history on file. for well woman exam  Encounter Diagnosis   Name Primary?     Encounter for gynecological examination (general) (routine) without abnormal findings Yes Plan:  The patient was counseled about diet, exercise, healthy lifestyle  We discussed current self breast exam and mammogram recommendations  We discussed current pap smear and HR HPV testing guidelines  I recommended follow up in one year for routine annual gynecologic exam or sooner if needed  I recommended follow up with a primary care physician for any chronic medical problems or non-gynecologic concerns    We discussed calcium/vitamin D/weight bearing exercise and osteoporosis prevention and bone density screening recommendations. Handouts were given to the patient  Disc repeat BMD 2 yrs, pt will do with PCP, not due yet  Discussed calcium/vitamin D/weight bearing exercise and osteoporosis prevention: handout given         Folllow up:  [x] return for annual well woman exam in one year or sooner if she is having problems  [] follow up and ultrasound  [] mammogram  [] 6 months  [] 6 weeks   []     No orders of the defined types were placed in this encounter. No results found for any visits on 02/16/22.

## 2022-02-16 ENCOUNTER — OFFICE VISIT (OUTPATIENT)
Dept: OBGYN CLINIC | Age: 67
End: 2022-02-16

## 2022-02-16 VITALS
HEIGHT: 66 IN | DIASTOLIC BLOOD PRESSURE: 72 MMHG | SYSTOLIC BLOOD PRESSURE: 127 MMHG | HEART RATE: 66 BPM | BODY MASS INDEX: 26.9 KG/M2 | WEIGHT: 167.4 LBS

## 2022-02-16 DIAGNOSIS — Z01.419 ENCOUNTER FOR GYNECOLOGICAL EXAMINATION (GENERAL) (ROUTINE) WITHOUT ABNORMAL FINDINGS: Primary | ICD-10-CM

## 2022-02-16 PROCEDURE — G8432 DEP SCR NOT DOC, RNG: HCPCS | Performed by: OBSTETRICS & GYNECOLOGY

## 2022-02-16 PROCEDURE — G8754 DIAS BP LESS 90: HCPCS | Performed by: OBSTETRICS & GYNECOLOGY

## 2022-02-16 PROCEDURE — 3017F COLORECTAL CA SCREEN DOC REV: CPT | Performed by: OBSTETRICS & GYNECOLOGY

## 2022-02-16 PROCEDURE — G0101 CA SCREEN;PELVIC/BREAST EXAM: HCPCS | Performed by: OBSTETRICS & GYNECOLOGY

## 2022-02-16 PROCEDURE — G9899 SCRN MAM PERF RSLTS DOC: HCPCS | Performed by: OBSTETRICS & GYNECOLOGY

## 2022-02-16 PROCEDURE — 1101F PT FALLS ASSESS-DOCD LE1/YR: CPT | Performed by: OBSTETRICS & GYNECOLOGY

## 2022-02-16 PROCEDURE — G8419 CALC BMI OUT NRM PARAM NOF/U: HCPCS | Performed by: OBSTETRICS & GYNECOLOGY

## 2022-02-16 PROCEDURE — 1090F PRES/ABSN URINE INCON ASSESS: CPT | Performed by: OBSTETRICS & GYNECOLOGY

## 2022-02-16 PROCEDURE — G8752 SYS BP LESS 140: HCPCS | Performed by: OBSTETRICS & GYNECOLOGY

## 2022-02-16 RX ORDER — ZINC GLUCONATE 10 MG
LOZENGE ORAL
COMMUNITY

## 2022-03-18 ENCOUNTER — PATIENT MESSAGE (OUTPATIENT)
Dept: HEMATOLOGY | Age: 67
End: 2022-03-18

## 2022-03-18 DIAGNOSIS — K75.4 AUTOIMMUNE HEPATITIS (HCC): Primary | ICD-10-CM

## 2022-03-18 NOTE — TELEPHONE ENCOUNTER
[email or web address] Placed lab slip in the mail. Attempt to call patient no answer left detail voice message. (KF)

## 2022-03-19 PROBLEM — K75.4 AUTOIMMUNE HEPATITIS (HCC): Status: ACTIVE | Noted: 2020-11-27

## 2022-03-30 LAB
ERYTHROCYTE [DISTWIDTH] IN BLOOD BY AUTOMATED COUNT: 14.4 % (ref 11.7–15.4)
HCT VFR BLD AUTO: 38.8 % (ref 34–46.6)
HGB BLD-MCNC: 13.2 G/DL (ref 11.1–15.9)
MCH RBC QN AUTO: 29.5 PG (ref 26.6–33)
MCHC RBC AUTO-ENTMCNC: 34 G/DL (ref 31.5–35.7)
MCV RBC AUTO: 87 FL (ref 79–97)
PLATELET # BLD AUTO: 174 X10E3/UL (ref 150–450)
RBC # BLD AUTO: 4.47 X10E6/UL (ref 3.77–5.28)
WBC # BLD AUTO: 4.4 X10E3/UL (ref 3.4–10.8)

## 2022-03-31 ENCOUNTER — OFFICE VISIT (OUTPATIENT)
Dept: HEMATOLOGY | Age: 67
End: 2022-03-31
Payer: MEDICARE

## 2022-03-31 VITALS
TEMPERATURE: 98.1 F | SYSTOLIC BLOOD PRESSURE: 136 MMHG | BODY MASS INDEX: 26.69 KG/M2 | HEART RATE: 67 BPM | HEIGHT: 65 IN | WEIGHT: 160.2 LBS | DIASTOLIC BLOOD PRESSURE: 78 MMHG

## 2022-03-31 DIAGNOSIS — K75.4 AUTOIMMUNE HEPATITIS (HCC): Primary | ICD-10-CM

## 2022-03-31 LAB
ALBUMIN SERPL-MCNC: 3.9 G/DL (ref 3.8–4.8)
ALP SERPL-CCNC: 61 IU/L (ref 44–121)
ALT SERPL-CCNC: 11 IU/L (ref 0–32)
AST SERPL-CCNC: 25 IU/L (ref 0–40)
BILIRUB DIRECT SERPL-MCNC: 0.11 MG/DL (ref 0–0.4)
BILIRUB SERPL-MCNC: 0.4 MG/DL (ref 0–1.2)
BUN SERPL-MCNC: 12 MG/DL (ref 8–27)
BUN/CREAT SERPL: 14 (ref 12–28)
CALCIUM SERPL-MCNC: 9 MG/DL (ref 8.7–10.3)
CHLORIDE SERPL-SCNC: 96 MMOL/L (ref 96–106)
CO2 SERPL-SCNC: 25 MMOL/L (ref 20–29)
CREAT SERPL-MCNC: 0.84 MG/DL (ref 0.57–1)
EGFR: 77 ML/MIN/1.73
GLUCOSE SERPL-MCNC: 77 MG/DL (ref 65–99)
POTASSIUM SERPL-SCNC: 4.3 MMOL/L (ref 3.5–5.2)
PROT SERPL-MCNC: 6.1 G/DL (ref 6–8.5)
SODIUM SERPL-SCNC: 137 MMOL/L (ref 134–144)

## 2022-03-31 PROCEDURE — G9899 SCRN MAM PERF RSLTS DOC: HCPCS | Performed by: PHYSICIAN ASSISTANT

## 2022-03-31 PROCEDURE — G8536 NO DOC ELDER MAL SCRN: HCPCS | Performed by: PHYSICIAN ASSISTANT

## 2022-03-31 PROCEDURE — 1090F PRES/ABSN URINE INCON ASSESS: CPT | Performed by: PHYSICIAN ASSISTANT

## 2022-03-31 PROCEDURE — G8419 CALC BMI OUT NRM PARAM NOF/U: HCPCS | Performed by: PHYSICIAN ASSISTANT

## 2022-03-31 PROCEDURE — 1101F PT FALLS ASSESS-DOCD LE1/YR: CPT | Performed by: PHYSICIAN ASSISTANT

## 2022-03-31 PROCEDURE — G0463 HOSPITAL OUTPT CLINIC VISIT: HCPCS | Performed by: PHYSICIAN ASSISTANT

## 2022-03-31 PROCEDURE — 99214 OFFICE O/P EST MOD 30 MIN: CPT | Performed by: PHYSICIAN ASSISTANT

## 2022-03-31 PROCEDURE — G8432 DEP SCR NOT DOC, RNG: HCPCS | Performed by: PHYSICIAN ASSISTANT

## 2022-03-31 PROCEDURE — G8427 DOCREV CUR MEDS BY ELIG CLIN: HCPCS | Performed by: PHYSICIAN ASSISTANT

## 2022-03-31 PROCEDURE — G8400 PT W/DXA NO RESULTS DOC: HCPCS | Performed by: PHYSICIAN ASSISTANT

## 2022-03-31 PROCEDURE — 3017F COLORECTAL CA SCREEN DOC REV: CPT | Performed by: PHYSICIAN ASSISTANT

## 2022-03-31 PROCEDURE — 91200 LIVER ELASTOGRAPHY: CPT | Performed by: PHYSICIAN ASSISTANT

## 2022-03-31 NOTE — PROGRESS NOTES
MD Mundo, [address], [place], [place]      SOLE Bennett, [facility]     Anisa HILLS Humble, [facility]   JORGE Marks, [facility]       Charles Razouta Moises De [address]    at [address], [address] [place]  22.    [phone]    FAX: [address] [place] [address] Drive, [address], [address]    [phone]    FAX: [phone]       Patient Care Team:  Kera Villeda MD as PCP - General (Internal Medicine)  Campbell Forbes MD as Physician (Obstetrics & Gynecology)  Jluis Guerrero MD (Breast Surgery)  Jen Chatterjee MD (Hematology and Oncology)  Seamus Davis MD (Hepatology)    Problem List  Date Reviewed: 2/16/2022          Codes Class Noted    Autoimmune hepatitis [facility]) ICD-10-CM: K75.4  ICD-9-CM: 571.42  11/27/2020        Hypertension ICD-10-CM: Q99  ICD-9-CM: 401.9  Unknown              Hawa Sizer is being seen at [facility] for management of Autoimmune Hepatitis. The active problem list, all pertinent past medical history, medications, liver histology, radiologic findings and laboratory findings related to the liver disorder were reviewed and discussed with the patient. The patient is a 77 y.o. Black female who was found to have elevated liver enzymes in the 2000 range and jaundice in 10/2020. Serologic evaluation for markers of chronic liver disease was positive for ASMA     A liver biopsy performed 10/2020 demonstrated severe chronic hepatitis with PMN, and stage 3 fibrosis consistent with AIH. She was treated with prednisone and the liver enzymes came down rapidly. Azathioprine was added and the liver enzymes were normal in 1/2021.    However, with continued tapering of prednisone below 20 mg the liver enzymes have relapsed. She was ultimately weaned off of the prednisone in 10/2021. She then developed diarrhea lasting ~10 weeks with some blood-streaking in 12/2021 and after discussion with hepatology here, discontinued use of azathioprine in 12/2021. She reports that the diarrhea resolved within one week and she has felt well. Labs have been monitored monthly since discontinuation and she has had no elevation of enzymes. The patient is not experiencing the following symptoms of liver disease:  yellowing of the eyes or skin, itching, dark urine, problems concentrating, swelling of the abdomen, swelling of the lower extremities,     The patient completes all daily activities without any functional limitations. ASSESSMENT AND PLAN:  Autoimmune hepatitis:   The diagnosis is based on liver biopsy, and serologies that is positive for ASMA    Liver biopsy demonstrates severe aggressive inflammation, severe interface activity, severe PMN, stage 3 bridging  Fibrosis. Given the severity of the inflammation the degree of fibrosis may be overestimated. I have reviewed with her the results of the Fibroscan which indicate a likely reduction in overall inflammation and fibrosis with management of the AIH. We can consider repeating this value in a year. At diagnosis the liver enzymes were in the 2000 range, this responded well to management with prednisone and azathioprine. She had severe side effects from azathioprine and this was discontinued in 12/2021. Thus far, there has been no sign of flare in enzymes on labs done monthly x 4. Will plan on repeating labs every 3 months to monitor. She understands that if her values flare in the future, she would need to be restarted on prednisone and maintained on an alternate therapy long-term. Elevation in Ferritin  There is an elevation in ferritin with Elevated iron saturation.     HFE genetic testing was negative for any known mutations    The elevation in ferritin reflects hepatic inflammation and will decline back to normal with treatment of AIH. Hepatic lesion:   There is a 1.2 x 0.6 cm mass in the right lobe, segment 5. This has benign features on MRI and may represent a small sclerosed hemangioma. There has been no change in size or characteristics of this finding on recent CT scan in 12/2021. Screening for Hepatocellular Carcinoma  HCC screening is not necessary if the patient has no evidence of cirrhosis. Treatment of other medical problems in patients with chronic liver disease  There are no contraindications for the patient to take most medications that are necessary for treatment of other medical issues. Counseling for alcohol in patients with chronic liver disease  The patient does not consume any significant amount of alcohol. Vaccinations   Vaccination for viral hepatitis A is not needed. The patient has serologic evidence of prior exposure or vaccination with immunity  Routine vaccinations against other bacterial and viral agents can be performed as indicated. Annual flu vaccination should be administered if indicated. ALLERGIES  Allergies   Allergen Reactions    Shellfish Derived Rash    Sulfa (Sulfonamide Antibiotics) Unknown (comments)     Pt denies       MEDICATIONS  Current Outpatient Medications   Medication Sig    magnesium 250 mg tab Take  by mouth.  losartan (COZAAR) 25 mg tablet 12.5 mg.    zinc gluconate 30 mg tab Take 30 mg by mouth every other day.  L.acid/L.casei/B.bif/B.deyanira/FOS (PROBIOTIC BLEND PO) Take  by mouth.  omega-3-dha-epa-fish oil (Omega-3 Fish Oil) 910-1,400 mg cap Take  by mouth.  turmeric/turmeric ext/pepr ext (turmeric-turmeric ext-pepper) 500-3 mg cap Take  by mouth.  folic acid (FOLVITE) 1 mg tablet     calcium carbonate (CALTREX) 600 mg calcium (1,500 mg) tablet Take 600 mg by mouth two (2) times a day.     cholecalciferol (VITAMIN D3) (2,000 UNITS /50 MCG) cap capsule Take  by mouth two (2) times a day.  multivitamin (ONE A DAY) tablet Take 1 Tab by mouth daily. No current facility-administered medications for this visit. SYSTEM REVIEW NOT RELATED TO LIVER DISEASE OR REVIEWED ABOVE:  Constitution systems: Negative for fever, chills, weight gain, weight loss. Eyes: Negative for visual changes. ENT: Negative for sore throat, painful swallowing. Respiratory: Negative for cough, hemoptysis, SOB. Cardiology: Negative for chest pain, palpitations. GI:  Negative for constipation or diarrhea. : Negative for urinary frequency, dysuria, hematuria, nocturia. Skin: Negative for rash. Hematology: Negative for easy bruising, blood clots. Musculo-skeletal: Positive for weakness and joint pains  Neurologic: Negative for headaches, dizziness, vertigo, memory problems not related to HE. Psychology: Negative for anxiety, depression. FAMILY HISTORY:  The father is .  of MI  The mother is .  of recurrent stroke  There is no family history of liver disease. The patient sister has SLE    SOCIAL HISTORY:  The patient is . The patient has 3 children and 9 grand children  The patient has never used tobacco products. The patient consumes alcohol on social occasions never in excess. The patient is retired. 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   No asterixis    LABORATORY STUDIES:  Liver [place] of [address] Units 3/30/2022 2022 2022   WBC 3.4 - 10.8 x10E3/uL 4.4 4.6 4.2   ANC 1.4 - 7.0 x10E3/uL  2.2 2.1   HGB 11.1 - 15.9 g/dL 13.2 12.8 13.5    - 450 x10E3/uL 174 176 172   INR 0.9 - 1.2      AST 0 - 40 IU/L 25 20 19   ALT 0 - 32 IU/L 11 10 12   Alk Phos 44 - 121 IU/L 61 55 52   Bili, Total 0.0 - 1.2 mg/dL 0.4 0.3 0.3   Bili, Direct 0.00 - 0.40 mg/dL 0.11 0.11 0.11   Albumin 3.8 - 4.8 g/dL 3.9 4.1 4.1   BUN 8 - 27 mg/dL 12 17 10   Creat 0.57 - 1.00 mg/dL 0.84 0.72 0.68   Na 134 - 144 mmol/L 137 136 136   K 3.5 - 5.2 mmol/L 4.3 4.3 4.1   Cl 96 - 106 mmol/L 96 97 96   CO2 20 - 29 mmol/L 25 25 29   Glucose 65 - 99 mg/dL 77 80 79   Magnesium 1.6 - 2.4 mg/dL        SEROLOGIES:  Serologies Latest Ref Rng & Units 10/22/2020 10/20/2020   Hep B Surface Ag Index  <0.10   Hep B Surface Ag Interp NEG    Negative   Hep C Ab NR    NONREACTIVE   Ferritin 26 - 388 NG/ML 6,200 (H)    Iron % Saturation 20 - 50 % 93 (H)    ASMCA 0 - 19 Units 25 (H)    M2 Ab 0.0 - 20.0 Units <20.0    Ceruloplasmin 19.0 - 39.0 mg/dL 30.2      Serologies Latest Ref Rng & Units 1/8/2021   Hep A Ab, Total Negative Positive (A)     LIVER HISTOLOGY:  10/2020. Slides reviewed by MLS and NA. Chronic hepatitis. severe portal inflammation, with severe interface hepatitis, with severe PMN. severe lobular inflammation. NO steatosis. Sharmin stage 4 fibrosis. Metavir stage 3 fibrosis. Knodell score (3496). 3/2022. FibroScan performed at [facility]. EkPa was 7.6. Suggested fibrosis level is F1-2. CAP score was 227. ENDOSCOPIC PROCEDURES:  Not available or performed    RADIOLOGY:  10/2020: MRI/MRCP liver: . Small indeterminate hepatic lesion is of questionable significance. A  sclerosing hemangioma is favored. 10/2020: CT scan [facility] Liver: Periportal edema vs intrahepatic biliary ductal dilation  12/2021. CT abdomen. Unchanged small indeterminate lesion in segment 5 of the liver. Sclerosing  hemangioma is favored    OTHER TESTING:  Not available or performed      FOLLOW-UP:  All of the issues listed above in the Assessment and Plan were discussed with the patient.   All questions were answered. The patient expressed a clear understanding of the above. [facility] in 6 months for routine monitoring in person, repeat labs in 3 months. Documentation reviewed and updated to reflect current, accurate patient information.     Regina Davalos PA-C  Liver [facility] 19, [address], [place]  [place].  [phone]  [facility] [address]

## 2022-03-31 NOTE — PROGRESS NOTES
Identified pt with two pt identifiers(name and ). Reviewed record in preparation for visit and have obtained necessary documentation. Chief Complaint   Patient presents with    Other     [facility]      Vitals:    22 1450   BP: 136/78   Pulse: 67   Temp: 98.1 °F (36.7 °C)   TempSrc: Temporal   Weight: 160 lb 3.2 oz (72.7 kg)   Height: 5' 5\" (1.651 m)   PainSc:   0 - No pain       Health Maintenance Review: Patient reminded of \"due or due soon\" health maintenance. I have asked the patient to contact his/her primary care provider (PCP) for follow-up on his/her health maintenance. Coordination of Care Questionnaire:  :   1) Have you been to an emergency room, urgent care, or hospitalized since your last visit? If yes, where when, and reason for visit? no       2. Have seen or consulted any other health care provider since your last visit? If yes, where when, and reason for visit? NO      Patient is accompanied by  I have received verbal consent from Chilo Ansari to discuss any/all medical information while they are present in the room.

## 2022-03-31 NOTE — PROGRESS NOTES
Reviewed labs with patient at the time of office visit. Continued normal off of all immunosuppression. Repeat labs in 3 months.

## 2022-05-26 LAB
ERYTHROCYTE [DISTWIDTH] IN BLOOD BY AUTOMATED COUNT: 13.7 % (ref 11.7–15.4)
HCT VFR BLD AUTO: 41 % (ref 34–46.6)
HGB BLD-MCNC: 14.1 G/DL (ref 11.1–15.9)
MCH RBC QN AUTO: 29 PG (ref 26.6–33)
MCHC RBC AUTO-ENTMCNC: 34.4 G/DL (ref 31.5–35.7)
MCV RBC AUTO: 84 FL (ref 79–97)
PLATELET # BLD AUTO: 190 X10E3/UL (ref 150–450)
RBC # BLD AUTO: 4.86 X10E6/UL (ref 3.77–5.28)
WBC # BLD AUTO: 4 X10E3/UL (ref 3.4–10.8)

## 2022-05-27 LAB
ALBUMIN SERPL-MCNC: 4.6 G/DL (ref 3.8–4.8)
ALP SERPL-CCNC: 61 IU/L (ref 44–121)
ALT SERPL-CCNC: 10 IU/L (ref 0–32)
AST SERPL-CCNC: 25 IU/L (ref 0–40)
BILIRUB DIRECT SERPL-MCNC: 0.13 MG/DL (ref 0–0.4)
BILIRUB SERPL-MCNC: 0.5 MG/DL (ref 0–1.2)
BUN SERPL-MCNC: 16 MG/DL (ref 8–27)
BUN/CREAT SERPL: 18 (ref 12–28)
CALCIUM SERPL-MCNC: 9.2 MG/DL (ref 8.7–10.3)
CHLORIDE SERPL-SCNC: 96 MMOL/L (ref 96–106)
CO2 SERPL-SCNC: 26 MMOL/L (ref 20–29)
CREAT SERPL-MCNC: 0.87 MG/DL (ref 0.57–1)
EGFR: 73 ML/MIN/1.73
GLUCOSE SERPL-MCNC: 66 MG/DL (ref 65–99)
POTASSIUM SERPL-SCNC: 4.3 MMOL/L (ref 3.5–5.2)
PROT SERPL-MCNC: 6.6 G/DL (ref 6–8.5)
SODIUM SERPL-SCNC: 136 MMOL/L (ref 134–144)

## 2022-05-31 NOTE — PROGRESS NOTES
Pt notified via [address] letter of stable findings, no current immunosuppression since 12/2021. Repeat labs in 3 mo at follow-up.

## 2022-08-23 ENCOUNTER — TELEPHONE (OUTPATIENT)
Dept: HEMATOLOGY | Age: 67
End: 2022-08-23

## 2022-08-23 DIAGNOSIS — K75.4 AUTOIMMUNE HEPATITIS (HCC): Primary | ICD-10-CM

## 2022-09-17 LAB
ALBUMIN SERPL-MCNC: 4.5 G/DL (ref 3.8–4.8)
ALP SERPL-CCNC: 57 IU/L (ref 44–121)
ALT SERPL-CCNC: 11 IU/L (ref 0–32)
AST SERPL-CCNC: 21 IU/L (ref 0–40)
BILIRUB DIRECT SERPL-MCNC: 0.11 MG/DL (ref 0–0.4)
BILIRUB SERPL-MCNC: 0.4 MG/DL (ref 0–1.2)
BUN SERPL-MCNC: 16 MG/DL (ref 8–27)
BUN/CREAT SERPL: 19 (ref 12–28)
CALCIUM SERPL-MCNC: 9.1 MG/DL (ref 8.7–10.3)
CHLORIDE SERPL-SCNC: 95 MMOL/L (ref 96–106)
CO2 SERPL-SCNC: 26 MMOL/L (ref 20–29)
CREAT SERPL-MCNC: 0.83 MG/DL (ref 0.57–1)
EGFR: 77 ML/MIN/1.73
ERYTHROCYTE [DISTWIDTH] IN BLOOD BY AUTOMATED COUNT: 14 % (ref 11.7–15.4)
GLUCOSE SERPL-MCNC: 69 MG/DL (ref 65–99)
HCT VFR BLD AUTO: 41.1 % (ref 34–46.6)
HGB BLD-MCNC: 13.8 G/DL (ref 11.1–15.9)
MCH RBC QN AUTO: 28.6 PG (ref 26.6–33)
MCHC RBC AUTO-ENTMCNC: 33.6 G/DL (ref 31.5–35.7)
MCV RBC AUTO: 85 FL (ref 79–97)
PLATELET # BLD AUTO: 180 X10E3/UL (ref 150–450)
POTASSIUM SERPL-SCNC: 4.2 MMOL/L (ref 3.5–5.2)
PROT SERPL-MCNC: 6.2 G/DL (ref 6–8.5)
RBC # BLD AUTO: 4.82 X10E6/UL (ref 3.77–5.28)
SODIUM SERPL-SCNC: 135 MMOL/L (ref 134–144)
WBC # BLD AUTO: 4.6 X10E3/UL (ref 3.4–10.8)

## 2022-09-26 ENCOUNTER — VIRTUAL VISIT (OUTPATIENT)
Dept: HEMATOLOGY | Age: 67
End: 2022-09-26
Payer: MEDICARE

## 2022-09-26 DIAGNOSIS — K75.4 AUTOIMMUNE HEPATITIS (HCC): Primary | ICD-10-CM

## 2022-09-26 PROBLEM — J32.9 CHRONIC SINUSITIS: Status: ACTIVE | Noted: 2022-09-26

## 2022-09-26 PROBLEM — H18.519 FUCHS' CORNEAL DYSTROPHY: Status: ACTIVE | Noted: 2022-09-26

## 2022-09-26 PROBLEM — R41.3 MEMORY IMPAIRMENT: Status: ACTIVE | Noted: 2022-09-26

## 2022-09-26 PROBLEM — I44.7 LEFT BUNDLE BRANCH BLOCK: Status: ACTIVE | Noted: 2022-09-26

## 2022-09-26 PROBLEM — E66.3 OVERWEIGHT: Status: ACTIVE | Noted: 2022-09-26

## 2022-09-26 PROBLEM — I10 BENIGN ESSENTIAL HYPERTENSION: Status: ACTIVE | Noted: 2022-09-26

## 2022-09-26 PROCEDURE — 1101F PT FALLS ASSESS-DOCD LE1/YR: CPT | Performed by: PHYSICIAN ASSISTANT

## 2022-09-26 PROCEDURE — 3017F COLORECTAL CA SCREEN DOC REV: CPT | Performed by: PHYSICIAN ASSISTANT

## 2022-09-26 PROCEDURE — G8427 DOCREV CUR MEDS BY ELIG CLIN: HCPCS | Performed by: PHYSICIAN ASSISTANT

## 2022-09-26 PROCEDURE — 1123F ACP DISCUSS/DSCN MKR DOCD: CPT | Performed by: PHYSICIAN ASSISTANT

## 2022-09-26 PROCEDURE — G0463 HOSPITAL OUTPT CLINIC VISIT: HCPCS | Performed by: PHYSICIAN ASSISTANT

## 2022-09-26 PROCEDURE — G9899 SCRN MAM PERF RSLTS DOC: HCPCS | Performed by: PHYSICIAN ASSISTANT

## 2022-09-26 PROCEDURE — 99214 OFFICE O/P EST MOD 30 MIN: CPT | Performed by: PHYSICIAN ASSISTANT

## 2022-09-26 PROCEDURE — G8400 PT W/DXA NO RESULTS DOC: HCPCS | Performed by: PHYSICIAN ASSISTANT

## 2022-09-26 PROCEDURE — 1090F PRES/ABSN URINE INCON ASSESS: CPT | Performed by: PHYSICIAN ASSISTANT

## 2022-09-26 PROCEDURE — G8432 DEP SCR NOT DOC, RNG: HCPCS | Performed by: PHYSICIAN ASSISTANT

## 2022-09-26 PROCEDURE — G8756 NO BP MEASURE DOC: HCPCS | Performed by: PHYSICIAN ASSISTANT

## 2022-09-26 NOTE — PROGRESS NOTES
Identified pt with two pt identifiers(name and ). Reviewed record in preparation for visit and have obtained necessary documentation. No chief complaint on file. There were no vitals filed for this visit. Health Maintenance Review: Patient reminded of \"due or due soon\" health maintenance. I have asked the patient to contact his/her primary care provider (PCP) for follow-up on his/her health maintenance. Coordination of Care Questionnaire:  :   1) Have you been to an emergency room, urgent care, or hospitalized since your last visit? If yes, where when, and reason for visit? no       2. Have seen or consulted any other health care provider since your last visit? If yes, where when, and reason for visit? YES, Orthopedic specialist for right shoulder, starts pt 22      Patient is accompanied by self I have received verbal consent from Chilo Ansari to discuss any/all medical information while they are present in the room.

## 2022-09-26 NOTE — PROGRESS NOTES
[facility], MD, [place], Santhosh Peterson, [facility]      SOLE Hernandez, [facility]     Anisa Kate, [facility]   Andrew Benitez FNP-DOROTHY Gonsales, [facility]       Charles Valdez [facility]    at [address], [address] Yessi Cannon  22. [phone]    FAX: [address] [place] [address], [address]    [phone]    FAX: [phone]       Patient Care Team:  Meera Barclay MD as PCP - General (Internal Medicine Physician)  Sarah Urbina MD as Physician (Obstetrics & Gynecology)  Julia Kemp MD (Surgery General)  Felicia Tong MD (Hematology and Oncology)  Manpreet Reynolds MD (Hepatology)    Problem List  Date Reviewed: 3/31/2022            Codes Class Noted    Benign essential hypertension ICD-10-CM: I10  ICD-9-CM: 401.1  9/26/2022        Chronic sinusitis ICD-10-CM: J32.9  ICD-9-CM: 473.9  9/26/2022        Fuchs' corneal dystrophy ICD-10-CM: H18.519  ICD-9-CM: 371.57  9/26/2022        Left bundle branch block ICD-10-CM: I44.7  ICD-9-CM: 426.3  9/26/2022        Memory impairment ICD-10-CM: R41.3  ICD-9-CM: 780.93  9/26/2022        Overweight ICD-10-CM: E66.3  ICD-9-CM: 278.02  9/26/2022        Autoimmune hepatitis [facility]) ICD-10-CM: K75.4  ICD-9-CM: 571.42  11/27/2020        Hypertension ICD-10-CM: D19  ICD-9-CM: 401.9  Unknown           VIRTUAL TELEHEALTH VISIT PERFORMED DUE TO COVID-19 EPIDEMIC    CONSENT:  Pranav Elizabeth, who was evaluated through a synchronous (real-time) audio-video encounter, and/or his healthcare decision maker, is aware that it is a billable service, which includes applicable co-pays, with coverage as determined by his insurance carrier.  He provided verbal consent to proceed and patient identification was verified. This visit was conducted pursuant to the emergency declaration under the [facility], [address] [place] [address] authority and the [employer] Act. A caregiver was present when appropriate. Ability to conduct physical exam was limited. The patient was located at home in a state where the provider was licensed to provide care. Whitney Hyatt is being seen at [facility] for management of Autoimmune Hepatitis. The active problem list, all pertinent past medical history, medications, liver histology, radiologic findings and laboratory findings related to the liver disorder were reviewed and discussed with the patient. The patient is a 79 y.o. Black female who was found to have elevated liver enzymes in the 2000 range and jaundice in 10/2020. Serologic evaluation for markers of chronic liver disease was positive for ASMA     A liver biopsy performed 10/2020 demonstrated severe chronic hepatitis with PMN, and stage 3 fibrosis consistent with AIH. She was treated with prednisone and the liver enzymes came down rapidly. Azathioprine was added and the liver enzymes were normal in 1/2021. However, with continued tapering of prednisone below 20 mg the liver enzymes had relapsed. She was ultimately weaned off of the prednisone in 10/2021. She then developed diarrhea lasting ~10 weeks with some blood-streaking in 12/2021 and after discussion with hepatology here, discontinued use of azathioprine in 12/2021. She reports that the diarrhea resolved within one week and she has felt well. She felt that the symptoms/side effects that she had associated with this medication     Labs have been monitored monthly since discontinuation and she has had no elevation of enzymes.      The patient is not experiencing the following symptoms of liver disease:  yellowing of the eyes or skin, itching, dark urine, problems concentrating, swelling of the abdomen, swelling of the lower extremities,     The patient completes all daily activities without any functional limitations. She has been doing Weight Watchers and has lost 10#, she is presently at 155#. ASSESSMENT AND PLAN:  Autoimmune hepatitis:   The diagnosis is based on liver biopsy, and serologies that is positive for ASMA    Liver biopsy demonstrates severe aggressive inflammation, severe interface activity, severe PMN, stage 3 bridging  Fibrosis. Given the severity of the inflammation the degree of fibrosis may be overestimated. I have reviewed with her the results of the Fibroscan which indicate a likely reduction in overall inflammation and fibrosis with management of the AIH. We can consider repeating this value at the time of the next visit. At diagnosis the liver enzymes were in the 2000 range, this responded well to management with prednisone and azathioprine. She had severe side effects from azathioprine and this was discontinued in 12/2021. Thus far, there has been no sign of flare in enzymes on labs done monthly x 4. Will plan on repeating labs every 3 months to monitor. She understands that if her values flare in the future, she would need to be restarted on prednisone and maintained on an alternate therapy long-term. We will repeat labs in 12/2022 at the time of repeat US of the liver and plan on bring her back to the office for repeat Fibroscan in Spring 2023. Elevation in Ferritin  There is an elevation in ferritin with Elevated iron saturation. HFE genetic testing was negative for any known mutations    The elevation in ferritin reflects hepatic inflammation and will decline back to normal with treatment of AIH. Hepatic lesion:   There is a 1.2 x 0.6 cm mass in the right lobe, segment 5. This has benign features on MRI and may represent a small sclerosed hemangioma.     There has been no change in size or characteristics of this finding on recent CT scan in 12/2021. I have ordered US to repeat this study in 12/2022 to document stability in the size of this lesion. Screening for Hepatocellular Carcinoma  HCC screening is not necessary if the patient has no evidence of cirrhosis. Treatment of other medical problems in patients with chronic liver disease  There are no contraindications for the patient to take most medications that are necessary for treatment of other medical issues. Counseling for alcohol in patients with chronic liver disease  The patient does not consume any significant amount of alcohol. Vaccinations   Vaccination for viral hepatitis A is not needed. The patient has serologic evidence of prior exposure or vaccination with immunity  Routine vaccinations against other bacterial and viral agents can be performed as indicated. Annual flu vaccination should be administered if indicated. ALLERGIES  Allergies   Allergen Reactions    Shellfish Derived Rash    Sulfa (Sulfonamide Antibiotics) Rash       MEDICATIONS  Current Outpatient Medications   Medication Sig    OTHER daily. Prevagen for brain health    losartan (COZAAR) 25 mg tablet 12.5 mg.    zinc gluconate 30 mg tab Take 30 mg by mouth every other day. L.acid/L.casei/B.bif/B.deyanira/FOS (PROBIOTIC BLEND PO) Take  by mouth. omega-3-dha-epa-fish oil (Omega-3 Fish Oil) 910-1,400 mg cap Take  by mouth. turmeric/turmeric ext/pepr ext (turmeric-turmeric ext-pepper) 500-3 mg cap Take  by mouth. folic acid (FOLVITE) 1 mg tablet     calcium carbonate (CALTREX) 600 mg calcium (1,500 mg) tablet Take 600 mg by mouth daily. cholecalciferol (VITAMIN D3) (2,000 UNITS /50 MCG) cap capsule Take  by mouth two (2) times a day. multivitamin (ONE A DAY) tablet Take 1 Tab by mouth daily. magnesium 250 mg tab Take  by mouth. No current facility-administered medications for this visit.        SYSTEM REVIEW NOT RELATED TO LIVER DISEASE OR REVIEWED ABOVE:  Constitution systems: Negative for fever, chills, weight gain. Significant for weight loss of ~12# in the past 6 months. Eyes: Negative for visual changes. ENT: Negative for sore throat, painful swallowing. Respiratory: Negative for cough, hemoptysis, SOB. Cardiology: Negative for chest pain, palpitations. GI:  Negative for constipation or diarrhea. : Negative for urinary frequency, dysuria, hematuria, nocturia. Skin: Negative for rash. Hematology: Negative for easy bruising, blood clots. Musculo-skeletal: Positive for weakness and joint pains  Neurologic: Negative for headaches, dizziness, vertigo, memory problems not related to HE. Psychology: Negative for anxiety, depression. FAMILY HISTORY:  The father is .  of MI  The mother is .  of recurrent stroke  There is no family history of liver disease. The patient sister has SLE    SOCIAL HISTORY:  The patient is . The patient has 3 children and 9 grand children  The patient has never used tobacco products. The patient consumes alcohol on social occasions never in excess. The patient is retired. 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   No asterixis    LABORATORY STUDIES:  Liver [place] of [address] & Units 2022   WBC 3.4 - 10.8 x10E3/uL 4.6 4.0   ANC 1.4 - 7.0 x10E3/uL     HGB 11.1 - 15.9 g/dL 13.8 14.1    - 450 x10E3/uL 180 190   INR 0.9 - 1.2     AST 0 - 40 IU/L 21 25   ALT 0 - 32 IU/L 11 10   Alk Phos 44 - 121 IU/L 57 61   Bili, Total 0.0 - 1.2 mg/dL 0.4 0.5   Bili, Direct 0.00 - 0.40 mg/dL 0.11 0.13   Albumin 3.8 - 4.8 g/dL 4.5 4.6   BUN 8 - 27 mg/dL 16 16   Creat 0.57 - 1.00 mg/dL 0.83 0.87   Na 134 - 144 mmol/L 135 136   K 3.5 - 5.2 mmol/L 4.2 4.3   Cl 96 - 106 mmol/L 95 (L) 96   CO2 20 - 29 mmol/L 26 26   Glucose 65 - 99 mg/dL 69 66   Magnesium 1.6 - 2.4 mg/dL       Liver [place] [address] Ref Rng & Units 3/30/2022   WBC 3.4 - 10.8 x10E3/uL 4.4   ANC 1.4 - 7.0 x10E3/uL    HGB 11.1 - 15.9 g/dL 13.2    - 450 x10E3/uL 174   INR 0.9 - 1.2    AST 0 - 40 IU/L 25   ALT 0 - 32 IU/L 11   Alk Phos 44 - 121 IU/L 61   Bili, Total 0.0 - 1.2 mg/dL 0.4   Bili, Direct 0.00 - 0.40 mg/dL 0.11   Albumin 3.8 - 4.8 g/dL 3.9   BUN 8 - 27 mg/dL 12   Creat 0.57 - 1.00 mg/dL 0.84   Na 134 - 144 mmol/L 137   K 3.5 - 5.2 mmol/L 4.3   Cl 96 - 106 mmol/L 96   CO2 20 - 29 mmol/L 25   Glucose 65 - 99 mg/dL 77   Magnesium 1.6 - 2.4 mg/dL        SEROLOGIES:  Serologies Latest Ref Rng & Units 10/22/2020 10/20/2020   Hep B Surface Ag Index  <0.10   Hep B Surface Ag Interp NEG    Negative   Hep C Ab NR    NONREACTIVE   Ferritin 26 - 388 NG/ML 6,200 (H)    Iron % Saturation 20 - 50 % 93 (H)    ASMCA 0 - 19 Units 25 (H)    M2 Ab 0.0 - 20.0 Units <20.0    Ceruloplasmin 19.0 - 39.0 mg/dL 30.2      Serologies Latest Ref Rng & Units 1/8/2021   Hep A Ab, Total Negative Positive (A)     LIVER HISTOLOGY:  10/2020. Slides reviewed by MLS and NA. Chronic hepatitis. severe portal inflammation, with severe interface hepatitis, with severe PMN. severe lobular inflammation. NO steatosis. Sharmin stage 4 fibrosis. Metavir stage 3 fibrosis. Knodell score (4399). 3/2022. [facility]. EkPa was 7.6. Suggested fibrosis level is F1-2. CAP score was 227. ENDOSCOPIC PROCEDURES:  Not available or performed    RADIOLOGY:  10/2020: MRI/MRCP liver: . Small indeterminate hepatic lesion is of questionable significance. A  sclerosing hemangioma is favored. 10/2020: CT scan WO Liver: Periportal edema vs intrahepatic biliary ductal dilation  12/2021. CT abdomen. Unchanged small indeterminate lesion in segment 5 of the liver. Sclerosing  hemangioma is favored. OTHER TESTING:  Not available or performed    FOLLOW-UP AFTER VIRTUAL VISIT:  Pursuant to the emergency declaration under the [facility] [place] [address] waiver authority and the [employer] Act, this Virtual  Visit was conducted, with the patient's (and/or their legal guardian's) consent, to reduce the patient's risk of exposure to COVID-19 and provide necessary medical care. Services were provided through a video synchronous discussion virtually to substitute for an in-person clinic visit. The patient was located in their home. The provider was located in the [facility]. All of the issues listed above in the Assessment and Plan were discussed with the patient. All questions were answered. The patient expressed a clear understanding of the above. [facility] 87 in 6 months for routine monitoring in person and for repeat Fibroscan. Repeat labs in 3 months at the time of the next ultrasound to monitor liver lesion. Documentation reviewed and updated to reflect current, accurate patient information.     Emmanuelle Whatley PA-C  Liver [place] [facility], [facility] [address]  22.  [phone]  [address] [facility]

## 2022-09-26 NOTE — Clinical Note
NOTIFICATION RETURN TO WORK / SCHOOL    9/26/2022 2:03 PM    Ms. Ariela Mg  [address] [facility] [address]      To Whom It May Concern:    Ariela Mg is currently under the care of [address]. She will return to work/school on: ***    If there are questions or concerns please have the patient contact our office.         Sincerely,      ANKUSH Mariee

## 2022-09-27 NOTE — PROGRESS NOTES
Reviewed results with pt at the time of office visit. No evidence of elevation of enzymes, she has been off of all immunosuppression since 12/2021. Will plan repeat labs and US in 12/2022. Follow-up for Fibroscan in 6 months.

## 2022-12-12 ENCOUNTER — HOSPITAL ENCOUNTER (OUTPATIENT)
Dept: ULTRASOUND IMAGING | Age: 67
Discharge: HOME | End: 2022-12-12
Attending: PHYSICIAN ASSISTANT
Payer: MEDICARE

## 2022-12-12 DIAGNOSIS — K75.4 AUTOIMMUNE HEPATITIS (HCC): ICD-10-CM

## 2022-12-12 PROCEDURE — 76700 US EXAM ABDOM COMPLETE: CPT

## 2022-12-13 NOTE — PROGRESS NOTES
Pt notified via [address] of stable liver enzymes without immunosuppression, now for almost one year. Follow-up as scheduled in 4/2023. The US does not clearly show the segment 5 lesion. Will consider repeat imaging with CT scan at next assessment.

## 2023-01-25 ENCOUNTER — OFFICE VISIT (OUTPATIENT)
Dept: CARDIOLOGY CLINIC | Age: 68
End: 2023-01-25
Payer: MEDICARE

## 2023-01-25 VITALS
OXYGEN SATURATION: 99 % | BODY MASS INDEX: 25.16 KG/M2 | HEART RATE: 60 BPM | HEIGHT: 65 IN | SYSTOLIC BLOOD PRESSURE: 120 MMHG | WEIGHT: 151 LBS | DIASTOLIC BLOOD PRESSURE: 66 MMHG

## 2023-01-25 DIAGNOSIS — I48.91 ATRIAL FIBRILLATION, UNSPECIFIED TYPE (HCC): ICD-10-CM

## 2023-01-25 DIAGNOSIS — I35.1 NONRHEUMATIC AORTIC VALVE INSUFFICIENCY: ICD-10-CM

## 2023-01-25 DIAGNOSIS — I10 ESSENTIAL HYPERTENSION: Primary | ICD-10-CM

## 2023-01-25 PROCEDURE — 93005 ELECTROCARDIOGRAM TRACING: CPT | Performed by: SPECIALIST

## 2023-01-25 PROCEDURE — G0463 HOSPITAL OUTPT CLINIC VISIT: HCPCS | Performed by: SPECIALIST

## 2023-01-25 NOTE — PATIENT INSTRUCTIONS

## 2023-01-25 NOTE — PROGRESS NOTES
CARDIOLOGY OFFICE NOTE    Az Light MD, [address], [place], [address] [facility]  Phone [phone]; Fax [phone]  Mobile [phone]   Voice Mail [phone]    Primary care: Moss Crigler, MD       ATTENTION:   This medical record was transcribed using an electronic medical records/speech recognition system. Although proofread, it may and can contain electronic, spelling and other errors. Corrections may be executed at a later time. Please feel free to contact us for any clarifications as needed. ICD-10-CM ICD-9-CM   1. Essential hypertension  I10 401.9   2. Atrial fibrillation, unspecified type ([address]  I48.91 427.31            Kranthi Hunt is a 79 y.o. female with  referred for abnormal ECG          Cardiac risk factors: family history, hypertension, post-menopausal  I have personally obtained the history from the patient. HISTORY OF PRESENTING ILLNESS      Kranthi Hunt is a 78 yo female who I saw in the hospital during the autoimmune hepatitis. Her autoimmune hepatitis is in remission. She has no chest pain or shortness of breath. We reviewed her cholesterol profile with her and suggested that we repeat her echo to look her aortic insufficiency and LV function. No PND orthopnea.               ACTIVE PROBLEM LIST     Patient Active Problem List    Diagnosis Date Noted    Benign essential hypertension 09/26/2022    Chronic sinusitis 09/26/2022    Fuchs' corneal dystrophy 09/26/2022    Left bundle branch block 09/26/2022    Memory impairment 09/26/2022    Overweight 09/26/2022    Autoimmune hepatitis ([facility] [address] [address] 11/27/2020    Hypertension            PAST MEDICAL HISTORY     Past Medical History:   Diagnosis Date    Bilateral dry eyes     Herpes simplex     Includes herpes labialis    History of mammogram 10/13/2016; 11/13/18; 01/03/2020    negative; Negative; negative birads 1 dense    Hx of mammogram 09/30/14; 10/1/15    normal; negative    Hx of mammogram 01/15/2021; 2/16/22    BI-RADS 1: Negative; BI-RADS 1: Negative    Hypertension     Pap smear for cervical cancer screening 1/15/21, 9/14/10; 9/18/12; 10/1/15; 11/13/18    Negative, HPV negative; Negative; Negative, HPV negative; Neg/Neg HPV    Reflux 10/03/2020    Used Prilosec 1 week and altered diet           PAST SURGICAL HISTORY     Past Surgical History:   Procedure Laterality Date    BIOPSY LIVER  2020    COLONOSCOPY N/A 12/05/2018    COLONOSCOPY performed by Evelyn Molina MD at [address]    HX COLONOSCOPY  2006    Wnl    HX GYN  1982    tubal ligation    CO LAPAROSCOPY FULGURATION OVIDUCTS            ALLERGIES     Allergies   Allergen Reactions    Shellfish Derived Rash    Sulfa (Sulfonamide Antibiotics) Rash          FAMILY HISTORY     Family History   Problem Relation Age of Onset    Hypertension Mother     OSTEOARTHRITIS Mother     Osteoporosis Mother     Cancer Mother 80        lymphoma    Stroke Mother     Hypertension Father     Stroke Father     Hypertension Sister     OSTEOARTHRITIS Sister     Cancer Sister     Hypertension Brother     Cancer Maternal Uncle         colon    negative for cardiac disease       SOCIAL HISTORY     Social History     Socioeconomic History    Marital status:    Tobacco Use    Smoking status: Never    Smokeless tobacco: Never    Tobacco comments:     Never   Vaping Use    Vaping Use: Never used   Substance and Sexual Activity    Alcohol use: Not Currently     Alcohol/week: 1.0 - 2.0 standard drink     Types: 1 - 2 Glasses of wine per week     Comment: I rarely drink wine. Drug use: Never    Sexual activity: Yes     Partners: Male     Birth control/protection: Surgical     Comment: Tubal ligation 1982         MEDICATIONS     Current Outpatient Medications   Medication Sig    vitamin C-U-K-lutein-minerals (OCUVITE) tablet daily.     losartan (COZAAR) 25 mg tablet 12.5 mg.    L.acid/L.casei/B.bif/B.deyanira/FOS (PROBIOTIC BLEND PO) Take  by mouth. omega-3-dha-epa-fish oil (Omega-3 Fish Oil) 910-1,400 mg cap Take  by mouth. turmeric/turmeric ext/pepr ext (turmeric-turmeric ext-pepper) 500-3 mg cap Take  by mouth. folic acid (FOLVITE) 1 mg tablet     calcium carbonate (CALTREX) 600 mg calcium (1,500 mg) tablet Take 600 mg by mouth daily. cholecalciferol (VITAMIN D3) (2,000 UNITS /50 MCG) cap capsule Take  by mouth two (2) times a day. multivitamin (ONE A DAY) tablet Take 1 Tab by mouth daily. OTHER daily. Prevagen for brain health (Patient not taking: Reported on 1/25/2023)    magnesium 250 mg tab Take  by mouth. (Patient not taking: Reported on 1/25/2023)    zinc gluconate 30 mg tab Take 30 mg by mouth every other day. (Patient not taking: Reported on 1/25/2023)     No current facility-administered medications for this visit. I have reviewed the nurses notes, vitals, problem list, allergy list, medical history, family, social history and medications. REVIEW OF SYMPTOMS   Pertinent positives per HPI  General: Pt denies excessive weight gain or loss. Pt is able to conduct ADL's  HEENT: Denies blurred vision, headaches, hearing loss, epistaxis and difficulty swallowing. Respiratory: Denies cough, congestion, shortness of breath, MEREDITH, wheezing or stridor. Cardiovascular: Denies precordial pain, palpitations, edema or PND  Gastrointestinal: Denies poor appetite, indigestion, abdominal pain or blood in stool  Genitourinary: Denies hematuria, dysuria, increased urinary frequency  Musculoskeletal: Denies joint pain or swelling from muscles or joints  Neurologic: Denies tremor, paresthesias, headache, or sensory motor disturbance  Psychiatric: Denies confusion, insomnia, depression  Integumentray: Denies rash, itching or ulcers.   Hematologic: Denies easy bruising, bleeding     PHYSICAL EXAMINATION      Vitals:    01/25/23 1354   BP: 120/66   Pulse: 60   SpO2: 99%   Weight: 151 lb (68.5 kg)   Height: 5' 5\" (1.651 m) General: Well developed, in no acute distress. HEENT: No jaundice, oral mucosa moist, no oral ulcers  Neck: Supple, no stiffness, no lymphadenopathy, supple  Heart:  Normal S1/S2 negative S3 or S4. Regular, no murmur, gallop or rub, no jugular venous distention  Respiratory: Clear bilaterally x 4, no wheezing or rales  Extremities:  No edema, normal cap refill, no cyanosis. Musculoskeletal: No clubbing, no deformities  Neuro: A&Ox3, speech clear, gait stable, cooperative, no focal neurologic deficits  Skin: Skin color is normal. No rashes or lesions. Non diaphoretic, moist.          EK2021 normal sinus rhythm with left bundle branch block     DIAGNOSTIC DATA     1. Echo  10/22/20-EF 45 - 50%, mild AI/MR/PI, IVC: Moderately elevated central venous pressure (10-15 mmHg); IVC diameter is larger than 21 mm and collapses more than 50% with respiration. 21- EF 53%, LAE, mild AI, RVSP 22 mmHg,  Mildly dilated ascending aorta (3.5 cm). Ao Ascending Index is 1.96 cm/m2. 2. Lipids  11/3/21- , HDL 96, .4, TG 58         LABORATORY DATA            Lab Results   Component Value Date/Time    WBC 5.2 2022 10:13 AM    HGB 13.0 2022 10:13 AM    HCT 37.6 2022 10:13 AM    PLATELET 783  10:13 AM    MCV 84 2022 10:13 AM      Lab Results   Component Value Date/Time    Sodium 136 2022 10:13 AM    Potassium 4.2 2022 10:13 AM    Chloride 95 (L) 2022 10:13 AM    CO2 27 2022 10:13 AM    Anion gap 2 (L) 12/10/2021 09:08 AM    Glucose 81 2022 10:13 AM    BUN 9 2022 10:13 AM    Creatinine 0.78 2022 10:13 AM    BUN/Creatinine ratio 12 2022 10:13 AM    GFR est  2022 02:14 PM    GFR est non-AA 88 2022 02:14 PM    Calcium 8.9 2022 10:13 AM    Bilirubin, total 0.5 2022 10:13 AM    Alk.  phosphatase 59 2022 10:13 AM    Protein, total 6.2 2022 10:13 AM    Albumin 4.4 2022 10:13 AM Globulin 3.3 12/10/2021 09:08 AM    A-G Ratio 2.0 02/11/2022 02:14 PM    ALT (SGPT) 12 12/12/2022 10:13 AM      ECG 1/25/2023: Sinus bradycardia rate 58 branch block unchanged from previous ECG     ASSESSMENT/RECOMMENDATIONS:.      1. Mild cardiomyopathy  -EF in the past was in the low 50s  -No symptoms of heart failure  2. Hypertension  -Blood pressure below 120/66 she is on losartan  3. Mild AI  -will check limited echo to look at LV function and AI  4. Mildly elevated LDL  -Last LDL was about 115  -Consider calcium scoring    Return in 1 year    Orders Placed This Encounter    AMB POC EKG ROUTINE W/ 12 LEADS, INTER & REP     Order Specific Question:   Reason for Exam:     Answer:   HTN    vitamin M-R-Z-lutein-minerals (OCUVITE) tablet     Sig: daily. We discussed the expected course, resolution and complications of the diagnosis(es) in detail. Medication risks, benefits, costs, interactions, and alternatives were discussed as indicated. I advised him to contact the office if his condition worsens, changes or fails to improve as anticipated. He expressed understanding with the diagnosis(es) and plan                  I have discussed the diagnosis with  Jairon Dolan and the intended plan as seen in the above orders. Questions were answered concerning future plans. I have discussed medication side effects and warnings with the patient as well. Thank you,  Julia Hull, Sveta Reed MD for involving me in the care of  Jairon Dolan. Please do not hesitate to contact me for further questions/concerns. Az Sethi MD, [facility] [address]      [facility], [address] [place] [address] Drive      [phone] / [phone] Fax

## 2023-01-25 NOTE — LETTER
1/25/2023    Patient: Gwen Beebe   YOB: 1955   Date of Visit: 1/25/2023     Evelio Hernández MD  [address] [address]  Via Fax: [phone]    Dear Evelio Hernández MD,      Thank you for referring Ms. Jairon Dolan to [address] [place] [address] for evaluation. My notes for this consultation are attached. If you have questions, please do not hesitate to call me. I look forward to following your patient along with you.       Sincerely,    Perez Sterling MD

## 2023-01-25 NOTE — PROGRESS NOTES
Amy Hernandez is a 79 y.o. female    Vitals:    01/25/23 1354   BP: 120/66   BP 1 Location: Left upper arm   BP Patient Position: Sitting   BP Cuff Size: Adult   Pulse: 60   Height: 5' 5\" (1.651 m)   Weight: 151 lb (68.5 kg)   SpO2: 99%       Chief Complaint   Patient presents with    Irregular Heart Beat     AFIB    Cardiomyopathy    Hypertension       Chest pain NO  SOB NO  Dizziness NO  Swelling RIGHT ANKLE  Recent hospital visit NO  Refills NO  COVID VACCINE YES  HAD COVID?  YES

## 2023-01-30 ENCOUNTER — TRANSCRIBE ORDER (OUTPATIENT)
Dept: SCHEDULING | Age: 68
End: 2023-01-30

## 2023-01-30 DIAGNOSIS — Z00.00 PREVENTATIVE HEALTH CARE: Primary | ICD-10-CM

## 2023-02-24 ENCOUNTER — DOCUMENTATION ONLY (OUTPATIENT)
Dept: HEMATOLOGY | Age: 68
End: 2023-02-24

## 2023-02-24 DIAGNOSIS — K75.4 AUTOIMMUNE HEPATITIS (HCC): Primary | ICD-10-CM

## 2023-02-27 ENCOUNTER — PATIENT MESSAGE (OUTPATIENT)
Dept: OBGYN CLINIC | Age: 68
End: 2023-02-27

## 2023-03-02 ENCOUNTER — HOSPITAL ENCOUNTER (OUTPATIENT)
Dept: CT IMAGING | Age: 68
Discharge: HOME | End: 2023-03-02
Attending: SPECIALIST
Payer: SELF-PAY

## 2023-03-02 ENCOUNTER — OFFICE VISIT (OUTPATIENT)
Dept: OBGYN CLINIC | Age: 68
End: 2023-03-02

## 2023-03-02 VITALS
BODY MASS INDEX: 24.78 KG/M2 | SYSTOLIC BLOOD PRESSURE: 139 MMHG | HEIGHT: 66 IN | HEART RATE: 65 BPM | WEIGHT: 154.2 LBS | DIASTOLIC BLOOD PRESSURE: 79 MMHG

## 2023-03-02 DIAGNOSIS — Z00.00 PREVENTATIVE HEALTH CARE: ICD-10-CM

## 2023-03-02 DIAGNOSIS — Z01.419 ENCOUNTER FOR GYNECOLOGICAL EXAMINATION (GENERAL) (ROUTINE) WITHOUT ABNORMAL FINDINGS: Primary | ICD-10-CM

## 2023-03-02 PROCEDURE — 75571 CT HRT W/O DYE W/CA TEST: CPT

## 2023-03-02 NOTE — PROGRESS NOTES
[facility]  [email or web address]  [phone]    Armando Boyce MD, [address] [facility]     Annual Gynecologic Exam:  Chief Complaint   Patient presents with    Well Woman    Kevin Rodriguez is a No obstetric history on file. ,  79 y.o. female   No LMP recorded. Patient is postmenopausal.    She presents for her annual checkup. She is having no significant problems. Per Rooming Note:     No LMP recorded. Patient is postmenopausal.  Her periods are none  She does not have dysmenorrhea. Problems: no significant problems  Birth Control: post menopausal status. Last Pap: normal obtained 1/2021  She does not have a history of STEVEN 2, 3 or cervical cancer. Last Mammogram: had a recent mammogram 2/2022 which was negative for malignancy. It was normal   Last Bone Density: last year  Last colonoscopy: about 4 years ago, polyp WNL     Sexual history and Contraception:  Social History     Substance and Sexual Activity   Sexual Activity Yes    Partners: Male    Birth control/protection: Surgical    Comment: Tubal ligation 1982       Past Medical History:   Diagnosis Date    Bilateral dry eyes     Herpes simplex     Includes herpes labialis    History of mammogram 10/13/2016; 11/13/18; 01/03/2020    negative; Negative; negative birads 1 dense    Hx of mammogram 09/30/14; 10/1/15    normal; negative    Hx of mammogram 01/15/2021; 2/16/22    BI-RADS 1: Negative; BI-RADS 1: Negative    Hypertension     Pap smear for cervical cancer screening 1/15/21, 9/14/10; 9/18/12; 10/1/15; 11/13/18    Negative, HPV negative; Negative; Negative, HPV negative; Neg/Neg HPV    Reflux 10/03/2020    Used Prilosec 1 week and altered diet     Current Outpatient Medications   Medication Sig    OTHER previgen    vitamin O-N-Q-lutein-minerals (OCUVITE) tablet daily. losartan (COZAAR) 25 mg tablet 12.5 mg.    L.acid/L.casei/B.bif/B.deyanira/FOS (PROBIOTIC BLEND PO) Take  by mouth.     omega-3-dha-epa-fish oil (Omega-3 Fish Oil) 910-1,400 mg cap Take  by mouth. turmeric/turmeric ext/pepr ext (turmeric-turmeric ext-pepper) 500-3 mg cap Take  by mouth.    calcium carbonate (CALTREX) 600 mg calcium (1,500 mg) tablet Take 600 mg by mouth daily. cholecalciferol (VITAMIN D3) (2,000 UNITS /50 MCG) cap capsule Take  by mouth two (2) times a day. multivitamin (ONE A DAY) tablet Take 1 Tab by mouth daily. folic acid (FOLVITE) 1 mg tablet  (Patient not taking: Reported on 3/2/2023)     No current facility-administered medications for this visit. OB History    Para Term  AB Living                 SAB IAB Ectopic Molar Multiple Live Births             3   Obstetric Comments   Menarche: 6. LMP:age 54  # of Children: 3  [facility]. Hysterectomy/oophorectomy: no/no. Breast Bx: [id number],7562 RIGHT  Hx of Breast Feeding: yes.   BCP: no. Hormone therapy:  no      Past Surgical History:   Procedure Laterality Date    BIOPSY LIVER      COLONOSCOPY N/A 2018    COLONOSCOPY performed by Jluis Killian MD at [address] [place], [place] [address]    HX COLONOSCOPY  2006    Wnl    HX GYN  1982    tubal ligation    RI LAPAROSCOPY FULGURATION OVIDUCTS       Family History   Problem Relation Age of Onset    Hypertension Mother     OSTEOARTHRITIS Mother     Osteoporosis Mother     Cancer Mother 80        lymphoma    Stroke Mother     Hypertension Father     Stroke Father     Hypertension Sister     OSTEOARTHRITIS Sister     Cancer Sister     Hypertension Brother     Cancer Maternal Uncle         colon     Social History     Socioeconomic History    Marital status:      Spouse name: Not on file    Number of children: Not on file    Years of education: Not on file    Highest education level: Not on file   Occupational History    Not on file   Tobacco Use    Smoking status: Never    Smokeless tobacco: Never    Tobacco comments:     Never   Vaping Use    Vaping Use: Never used   Substance and Sexual Activity    Alcohol use: Not Currently     Alcohol/week: 0.0 - 1.0 standard drinks    Drug use: Never    Sexual activity: Yes     Partners: Male     Birth control/protection: Surgical     Comment: Tubal ligation 1982   Other Topics Concern    Not on file   Social History Narrative    Not on file     Social Determinants of Health     Financial Resource Strain: Not on file   Food Insecurity: Not on file   Transportation Needs: Not on file   Physical Activity: Not on file   Stress: Not on file   Social Connections: Not on file   Intimate Partner Violence: Not on file   Housing Stability: Not on file     Tobacco History:  reports that she has never smoked. She has never used smokeless tobacco.  Alcohol Abuse:  reports that she does not currently use alcohol. Drug Abuse:  reports no history of drug use.   Allergies   Allergen Reactions    Shellfish Derived Rash    Sulfa (Sulfonamide Antibiotics) Rash       Patient Active Problem List   Diagnosis Code    Hypertension I10    Autoimmune hepatitis ([facility] [address] K75.4    Benign essential hypertension I10    Chronic sinusitis J32.9    Fuchs' corneal dystrophy H18.519    Left bundle branch block I44.7    Memory impairment R41.3    Overweight E66.3       Review of Systems - History obtained from the patient and patient filled out questionnaire   Constitutional/general, HEENT, CV, Resp, GI, MSK, Neuro, Psych, Heme/lymph, Skin, Breast ROS: no significant complaints except as noted on HPI    Physical Exam  Visit Vitals  /79   Pulse 65   Ht 5' 5.5\" (1.664 m)   Wt 154 lb 3.2 oz (69.9 kg)   BMI 25.27 kg/m²       Constitutional  Appearance: well-nourished, well developed, alert, in no acute distress    HENT  Head and Face: appears normal    Neck  Inspection/Palpation: normal appearance, no masses or tenderness  Lymph Nodes: no lymphadenopathy present  Thyroid: gland size normal, nontender, no nodules or masses present on palpation    Chest  Respiratory Effort: breathing unlabored  Auscultation: normal breath sounds    Cardiovascular  Heart: Auscultation: regular rate and rhythm without murmur    Breasts  Inspection of Breasts: breasts symmetrical, no skin changes, no discharge present, nipple appearance normal, no skin retraction present  Palpation of Breasts and Axillae: no masses present on palpation, no breast tenderness  Axillary Lymph Nodes: no lymphadenopathy present    Gastrointestinal  Abdominal Examination: abdomen non-tender to palpation, normal bowel sounds, no masses present  Liver and spleen: no hepatomegaly present, spleen not palpable  Hernias: no hernias identified    Genitourinary  External Genitalia: normal appearance for age, no discharge present, no tenderness present, no inflammatory lesions present, no masses present  Vagina: normal vaginal vault without central or paravaginal defects, no discharge present, no inflammatory lesions present, no masses present  Bladder: non-tender to palpation  Urethra: appears normal  Cervix: normal   Uterus: normal size, shape and consistency  Adnexa: no adnexal tenderness present, no adnexal masses present  Perineum: perineum within normal limits, no evidence of trauma, no rashes or skin lesions present  Anus: anus within normal limits, no hemorrhoids present  Inguinal Lymph Nodes: no lymphadenopathy present    Skin  General Inspection: no rash, no lesions identified    Neurologic/Psychiatric  Mental Status:  Orientation: grossly oriented to person, place and time  Mood and Affect: mood normal, affect appropriate    Assessment:  79 y.o. No obstetric history on file. for well woman exam  Her current medical status is satisfactory with no evidence of significant gynecologic issues. Encounter Diagnosis   Name Primary?     Encounter for gynecological examination (general) (routine) without abnormal findings Yes       Plan:  The patient was counseled about diet, exercise, healthy lifestyle  I recommend annual well woman exams  We discussed current pap smear and HR HPV testing guidelines. I recommended follow up one year for routine annual gynecologic exam or sooner prn  Handouts were given to the patient  I recommended follow up with a primary care physician for chronic medical problems and evaluation of non-gynecologic concerns and to please contact our office with any GYN questions or concerns. I recommended testing per CDC guidelines and at patient request.           Folllow up:  [x] return for annual well woman exam in one year or sooner if she is having problems  [] follow up and ultrasound  [] 6 months  [] 3 months  [] 6 weeks   [] 1 month    No orders of the defined types were placed in this encounter. No results found for any visits on 03/02/23.

## 2023-03-02 NOTE — PROGRESS NOTES
Your calcium score is 153. I think she should go on a cholesterol-lowering medicine just because the calcium score is somewhat elevated. I would suggest that we go on Zocor 10 mg a day and recheck your labs in 2 months. Please let me know how you feel about this. All the best    Lanny Outlaw    This is good news    This indicates that we should lower your LDL or lousy cholesterol between .     All the best,    Lanny Outlaw

## 2023-03-02 NOTE — PROGRESS NOTES
Rex Abdalla is a 79 y.o. female returns for an annual exam     Chief Complaint   Patient presents with    Well Woman    Mammogram     ABN mammo & ae exam    No LMP recorded. Patient is postmenopausal.  Her periods are none  She does not have dysmenorrhea. Problems: no significant problems  Birth Control: post menopausal status. Last Pap: normal obtained 1/2021  She does not have a history of STEVEN 2, 3 or cervical cancer. Last Mammogram: had a recent mammogram 2/2022 which was negative for malignancy. It was normal   Last Bone Density: last year  Last colonoscopy: about 4 years ago, polyp WNL       1. Have you been to the ER, urgent care clinic, or hospitalized since your last visit? No    2. Have you seen or consulted any other health care providers outside of the [address] [place] [address] since your last visit?   PCP    Examination chaperoned by Dale Bergeron MA.

## 2023-03-10 DIAGNOSIS — E78.00 HYPERCHOLESTEREMIA: Primary | ICD-10-CM

## 2023-03-10 DIAGNOSIS — I10 PRIMARY HYPERTENSION: ICD-10-CM

## 2023-03-10 DIAGNOSIS — I35.1 NONRHEUMATIC AORTIC VALVE INSUFFICIENCY: ICD-10-CM

## 2023-03-10 DIAGNOSIS — I48.91 ATRIAL FIBRILLATION, UNSPECIFIED TYPE (HCC): ICD-10-CM

## 2023-03-10 DIAGNOSIS — I10 ESSENTIAL HYPERTENSION: ICD-10-CM

## 2023-03-15 ENCOUNTER — TRANSCRIBE ORDER (OUTPATIENT)
Dept: SCHEDULING | Age: 68
End: 2023-03-15

## 2023-03-15 DIAGNOSIS — E78.00 HIGH CHOLESTEROL: Primary | ICD-10-CM

## 2023-03-16 DIAGNOSIS — E78.00 HYPERCHOLESTEREMIA: Primary | ICD-10-CM

## 2023-03-16 RX ORDER — SIMVASTATIN 10 MG/1
10 TABLET, FILM COATED ORAL
COMMUNITY
End: 2023-03-16 | Stop reason: SDUPTHER

## 2023-03-16 RX ORDER — SIMVASTATIN 10 MG/1
10 TABLET, FILM COATED ORAL
Qty: 30 TABLET | Refills: 5 | Status: SHIPPED | OUTPATIENT
Start: 2023-03-16

## 2023-03-28 ENCOUNTER — TELEPHONE (OUTPATIENT)
Dept: CARDIOLOGY CLINIC | Age: 68
End: 2023-03-28

## 2023-03-28 NOTE — TELEPHONE ENCOUNTER
Patient states she has been taking Zocor for a little over 2 weeks an now she has a rash on her neck.  Patient would like a call at [phone]

## 2023-03-30 NOTE — TELEPHONE ENCOUNTER
Pt stopped medication and new face cream she was trying and it went away. She has restarted statin and will call back if rash reappears.

## 2023-04-17 ENCOUNTER — TELEPHONE (OUTPATIENT)
Dept: CARDIOLOGY CLINIC | Age: 68
End: 2023-04-17

## 2023-04-17 NOTE — TELEPHONE ENCOUNTER
Patient is calling because she is having an allergic reaction to the losartan. Patient has a rash around her neck. Pt says she has a liver that is healing. No SOB or problems breathing.     [phone]

## 2023-04-17 NOTE — TELEPHONE ENCOUNTER
Pt states that she restarted Simvastatin 4 weeks ago after stopping for a rash. Rash has reappeared and is concerned it is the statin. She does have a hx of autoimmune hepatitis. You started statin due to a calcium score of 153.

## 2023-04-17 NOTE — TELEPHONE ENCOUNTER
Jarad Yepez MD  You 32 minutes ago (12:13 PM)     MD  She needs to stop the Zocor but has she seen a dermatologist

## 2023-04-18 LAB
ALBUMIN SERPL-MCNC: 4 G/DL (ref 3.8–4.8)
ALP SERPL-CCNC: 50 IU/L (ref 44–121)
ALT SERPL-CCNC: 11 IU/L (ref 0–32)
AST SERPL-CCNC: 26 IU/L (ref 0–40)
BILIRUB DIRECT SERPL-MCNC: 0.11 MG/DL (ref 0–0.4)
BILIRUB SERPL-MCNC: 0.3 MG/DL (ref 0–1.2)
BUN SERPL-MCNC: 11 MG/DL (ref 8–27)
BUN/CREAT SERPL: 13 (ref 12–28)
CALCIUM SERPL-MCNC: 8.8 MG/DL (ref 8.7–10.3)
CHLORIDE SERPL-SCNC: 97 MMOL/L (ref 96–106)
CO2 SERPL-SCNC: 23 MMOL/L (ref 20–29)
CREAT SERPL-MCNC: 0.83 MG/DL (ref 0.57–1)
EGFRCR SERPLBLD CKD-EPI 2021: 77 ML/MIN/1.73
ERYTHROCYTE [DISTWIDTH] IN BLOOD BY AUTOMATED COUNT: 13.8 % (ref 11.7–15.4)
GLUCOSE SERPL-MCNC: 79 MG/DL (ref 70–99)
HCT VFR BLD AUTO: 37.5 % (ref 34–46.6)
HGB BLD-MCNC: 12.5 G/DL (ref 11.1–15.9)
MCH RBC QN AUTO: 29 PG (ref 26.6–33)
MCHC RBC AUTO-ENTMCNC: 33.3 G/DL (ref 31.5–35.7)
MCV RBC AUTO: 87 FL (ref 79–97)
PLATELET # BLD AUTO: 170 X10E3/UL (ref 150–450)
POTASSIUM SERPL-SCNC: 4.2 MMOL/L (ref 3.5–5.2)
PROT SERPL-MCNC: 6 G/DL (ref 6–8.5)
RBC # BLD AUTO: 4.31 X10E6/UL (ref 3.77–5.28)
SODIUM SERPL-SCNC: 135 MMOL/L (ref 134–144)
WBC # BLD AUTO: 5.4 X10E3/UL (ref 3.4–10.8)

## 2023-04-22 DIAGNOSIS — K75.4 AUTOIMMUNE HEPATITIS (HCC): Primary | ICD-10-CM

## 2023-04-23 DIAGNOSIS — E78.00 HYPERCHOLESTEREMIA: Primary | ICD-10-CM

## 2023-04-24 DIAGNOSIS — K75.4 AUTOIMMUNE HEPATITIS (HCC): Primary | ICD-10-CM

## 2023-05-03 ENCOUNTER — ANCILLARY PROCEDURE (OUTPATIENT)
Dept: CARDIOLOGY CLINIC | Age: 68
End: 2023-05-03
Payer: MEDICARE

## 2023-05-03 VITALS
DIASTOLIC BLOOD PRESSURE: 74 MMHG | SYSTOLIC BLOOD PRESSURE: 130 MMHG | WEIGHT: 154 LBS | HEIGHT: 65 IN | BODY MASS INDEX: 25.66 KG/M2

## 2023-05-03 LAB
ECHO AO ASC DIAM: 3.6 CM
ECHO AO ASCENDING AORTA INDEX: 2.03 CM/M2
ECHO AO ROOT DIAM: 3.2 CM
ECHO AO ROOT INDEX: 1.81 CM/M2
ECHO LA DIAMETER INDEX: 1.75 CM/M2
ECHO LA DIAMETER: 3.1 CM
ECHO LA TO AORTIC ROOT RATIO: 0.97
ECHO LV EDV A2C: 99 ML
ECHO LV EDV A4C: 94 ML
ECHO LV EDV BP: 104 ML (ref 56–104)
ECHO LV EDV INDEX A4C: 53 ML/M2
ECHO LV EDV INDEX BP: 59 ML/M2
ECHO LV EDV NDEX A2C: 56 ML/M2
ECHO LV EJECTION FRACTION A2C: 58 %
ECHO LV EJECTION FRACTION A4C: 47 %
ECHO LV EJECTION FRACTION BIPLANE: 55 % (ref 55–100)
ECHO LV ESV A2C: 41 ML
ECHO LV ESV A4C: 50 ML
ECHO LV ESV BP: 47 ML (ref 19–49)
ECHO LV ESV INDEX A2C: 23 ML/M2
ECHO LV ESV INDEX A4C: 28 ML/M2
ECHO LV ESV INDEX BP: 27 ML/M2
ECHO LV FRACTIONAL SHORTENING: 29 % (ref 28–44)
ECHO LV INTERNAL DIMENSION DIASTOLE INDEX: 2.77 CM/M2
ECHO LV INTERNAL DIMENSION DIASTOLIC: 4.9 CM (ref 3.9–5.3)
ECHO LV INTERNAL DIMENSION SYSTOLIC INDEX: 1.98 CM/M2
ECHO LV INTERNAL DIMENSION SYSTOLIC: 3.5 CM
ECHO LV IVSD: 0.9 CM (ref 0.6–0.9)
ECHO LV MASS 2D: 141.9 G (ref 67–162)
ECHO LV MASS INDEX 2D: 80.2 G/M2 (ref 43–95)
ECHO LV POSTERIOR WALL DIASTOLIC: 0.8 CM (ref 0.6–0.9)
ECHO LV RELATIVE WALL THICKNESS RATIO: 0.33

## 2023-05-03 PROCEDURE — 93325 DOPPLER ECHO COLOR FLOW MAPG: CPT | Performed by: SPECIALIST

## 2023-05-03 PROCEDURE — 93308 TTE F-UP OR LMTD: CPT | Performed by: SPECIALIST

## 2023-05-04 ENCOUNTER — TELEPHONE (OUTPATIENT)
Dept: CARDIOLOGY CLINIC | Age: 68
End: 2023-05-04

## 2023-05-04 ENCOUNTER — APPOINTMENT (OUTPATIENT)
Dept: CARDIAC REHAB | Age: 68
End: 2023-05-04
Attending: FAMILY MEDICINE

## 2023-07-25 ENCOUNTER — OFFICE VISIT (OUTPATIENT)
Age: 68
End: 2023-07-25
Payer: MEDICARE

## 2023-07-25 VITALS
HEIGHT: 65 IN | DIASTOLIC BLOOD PRESSURE: 80 MMHG | BODY MASS INDEX: 26.92 KG/M2 | SYSTOLIC BLOOD PRESSURE: 147 MMHG | HEART RATE: 65 BPM | WEIGHT: 161.6 LBS | OXYGEN SATURATION: 100 % | TEMPERATURE: 98.2 F

## 2023-07-25 DIAGNOSIS — K75.4 AUTOIMMUNE HEPATITIS (HCC): Primary | ICD-10-CM

## 2023-07-25 PROCEDURE — 3017F COLORECTAL CA SCREEN DOC REV: CPT | Performed by: PHYSICIAN ASSISTANT

## 2023-07-25 PROCEDURE — G8427 DOCREV CUR MEDS BY ELIG CLIN: HCPCS | Performed by: PHYSICIAN ASSISTANT

## 2023-07-25 PROCEDURE — 3079F DIAST BP 80-89 MM HG: CPT | Performed by: PHYSICIAN ASSISTANT

## 2023-07-25 PROCEDURE — 99214 OFFICE O/P EST MOD 30 MIN: CPT | Performed by: PHYSICIAN ASSISTANT

## 2023-07-25 PROCEDURE — 1123F ACP DISCUSS/DSCN MKR DOCD: CPT | Performed by: PHYSICIAN ASSISTANT

## 2023-07-25 PROCEDURE — 3077F SYST BP >= 140 MM HG: CPT | Performed by: PHYSICIAN ASSISTANT

## 2023-07-25 PROCEDURE — G8400 PT W/DXA NO RESULTS DOC: HCPCS | Performed by: PHYSICIAN ASSISTANT

## 2023-07-25 PROCEDURE — G8419 CALC BMI OUT NRM PARAM NOF/U: HCPCS | Performed by: PHYSICIAN ASSISTANT

## 2023-07-25 PROCEDURE — 1036F TOBACCO NON-USER: CPT | Performed by: PHYSICIAN ASSISTANT

## 2023-07-25 PROCEDURE — 1090F PRES/ABSN URINE INCON ASSESS: CPT | Performed by: PHYSICIAN ASSISTANT

## 2023-07-25 PROCEDURE — 91200 LIVER ELASTOGRAPHY: CPT | Performed by: PHYSICIAN ASSISTANT

## 2023-07-25 RX ORDER — CHLORAL HYDRATE 500 MG
CAPSULE ORAL
COMMUNITY

## 2023-07-25 RX ORDER — VIT C/HESPERIDIN/BIOFLAVONOIDS 500-100 MG
30 TABLET ORAL
COMMUNITY

## 2023-07-25 ASSESSMENT — PATIENT HEALTH QUESTIONNAIRE - PHQ9
SUM OF ALL RESPONSES TO PHQ9 QUESTIONS 1 & 2: 0
SUM OF ALL RESPONSES TO PHQ QUESTIONS 1-9: 0
SUM OF ALL RESPONSES TO PHQ QUESTIONS 1-9: 0
1. LITTLE INTEREST OR PLEASURE IN DOING THINGS: 0
SUM OF ALL RESPONSES TO PHQ QUESTIONS 1-9: 0
SUM OF ALL RESPONSES TO PHQ QUESTIONS 1-9: 0
2. FEELING DOWN, DEPRESSED OR HOPELESS: 0

## 2023-07-25 NOTE — PROGRESS NOTES
MD Jesus, FACP, [place], [place]      GENET Burnett, [facility]   Padmini Coy, [facility]   Willie Galarza, FNP-C  Porsha Mackey FNP-C   Edelmira Sandhu, AGPCNP-BC   Aretha Polk, [facility]      [address]   at [facility]   [address] [facility], [address] [facility], [address]   [phone]   FAX: [phone]  [facility] [place] [facility]   at [facility], [address] [facility], [address]   [phone]   FAX: [phone]       Patient Care Team:  Keshawn Black MD as PCP - [facility] Nitza Benites MD as PCP - [facility] Provider  Darion Kearney MD as Physician    Patient Active Problem List   Diagnosis    Hypertension    Autoimmune hepatitis [facility])    Benign essential hypertension    Chronic sinusitis    Fuchs' corneal dystrophy    Left bundle branch block    Memory impairment    Barbara Cordova is being seen at [address] [place] [address],[address] [facility] for management of Autoimmune Hepatitis. The active problem list, all pertinent past medical history, medications, liver histology, radiologic findings and laboratory findings related to the liver disorder were reviewed and discussed with the patient. The patient is a 76 y.o. Black female who was found to have elevated liver enzymes in the 2000 range and jaundice in 10/2020. Serologic evaluation for markers of chronic liver disease was positive for ASMA     A liver biopsy performed 10/2020 demonstrated severe chronic hepatitis with PMN, and stage 3 fibrosis consistent with AIH. She was treated with prednisone and the liver enzymes came down rapidly. Azathioprine was added and the liver enzymes were normal in 1/2021. However, with continued tapering of prednisone below 20 mg the liver enzymes had relapsed.

## 2023-07-26 LAB
ALBUMIN SERPL-MCNC: 3.8 G/DL (ref 3.5–5)
ALBUMIN/GLOB SERPL: 1.3 (ref 1.1–2.2)
ALP SERPL-CCNC: 51 U/L (ref 45–117)
ALT SERPL-CCNC: 24 U/L (ref 12–78)
ANION GAP SERPL CALC-SCNC: 5 MMOL/L (ref 5–15)
AST SERPL-CCNC: 24 U/L (ref 15–37)
BILIRUB DIRECT SERPL-MCNC: 0.1 MG/DL (ref 0–0.2)
BILIRUB SERPL-MCNC: 0.4 MG/DL (ref 0.2–1)
BUN SERPL-MCNC: 12 MG/DL (ref 6–20)
BUN/CREAT SERPL: 17 (ref 12–20)
CALCIUM SERPL-MCNC: 9.1 MG/DL (ref 8.5–10.1)
CHLORIDE SERPL-SCNC: 97 MMOL/L (ref 97–108)
CO2 SERPL-SCNC: 32 MMOL/L (ref 21–32)
CREAT SERPL-MCNC: 0.69 MG/DL (ref 0.55–1.02)
ERYTHROCYTE [DISTWIDTH] IN BLOOD BY AUTOMATED COUNT: 13.2 % (ref 11.5–14.5)
GLOBULIN SER CALC-MCNC: 3 G/DL (ref 2–4)
GLUCOSE SERPL-MCNC: 70 MG/DL (ref 65–100)
HCT VFR BLD AUTO: 38.9 % (ref 35–47)
HGB BLD-MCNC: 13.3 G/DL (ref 11.5–16)
MCH RBC QN AUTO: 28.6 PG (ref 26–34)
MCHC RBC AUTO-ENTMCNC: 34.2 G/DL (ref 30–36.5)
MCV RBC AUTO: 83.7 FL (ref 80–99)
NRBC # BLD: 0 K/UL (ref 0–0.01)
NRBC BLD-RTO: 0 PER 100 WBC
PLATELET # BLD AUTO: 173 K/UL (ref 150–400)
PMV BLD AUTO: 10.5 FL (ref 8.9–12.9)
POTASSIUM SERPL-SCNC: 4.2 MMOL/L (ref 3.5–5.1)
PROT SERPL-MCNC: 6.8 G/DL (ref 6.4–8.2)
RBC # BLD AUTO: 4.65 M/UL (ref 3.8–5.2)
SODIUM SERPL-SCNC: 134 MMOL/L (ref 136–145)
WBC # BLD AUTO: 5.1 K/UL (ref 3.6–11)

## 2023-07-26 NOTE — RESULT ENCOUNTER NOTE
Pt notified via [facility] message of stable findings. Will follow-up on repeat CT scan when available. Follow-up as scheduled in office in 6 months.

## 2023-08-17 ENCOUNTER — HOSPITAL ENCOUNTER (OUTPATIENT)
Facility: HOSPITAL | Age: 68
Discharge: HOME | End: 2023-08-17
Payer: MEDICARE

## 2023-08-17 DIAGNOSIS — K75.4 AUTOIMMUNE HEPATITIS (HCC): ICD-10-CM

## 2023-08-17 PROCEDURE — 74170 CT ABD WO CNTRST FLWD CNTRST: CPT

## 2023-08-17 PROCEDURE — 6360000004 HC RX CONTRAST MEDICATION: Performed by: PHYSICIAN ASSISTANT

## 2023-08-17 RX ADMIN — IOPAMIDOL 100 ML: 755 INJECTION, SOLUTION INTRAVENOUS at 08:00

## 2023-11-16 LAB
ERYTHROCYTE [DISTWIDTH] IN BLOOD BY AUTOMATED COUNT: 14.7 % (ref 11.7–15.4)
HCT VFR BLD AUTO: 37.9 % (ref 34–46.6)
HGB BLD-MCNC: 12.7 G/DL (ref 11.1–15.9)
MCH RBC QN AUTO: 29.2 PG (ref 26.6–33)
MCHC RBC AUTO-ENTMCNC: 33.5 G/DL (ref 31.5–35.7)
MCV RBC AUTO: 87 FL (ref 79–97)
PLATELET # BLD AUTO: 181 X10E3/UL (ref 150–450)
RBC # BLD AUTO: 4.35 X10E6/UL (ref 3.77–5.28)
WBC # BLD AUTO: 3.9 X10E3/UL (ref 3.4–10.8)

## 2023-11-17 LAB
ALBUMIN SERPL-MCNC: 4.1 G/DL (ref 3.9–4.9)
ALP SERPL-CCNC: 47 IU/L (ref 44–121)
ALT SERPL-CCNC: 12 IU/L (ref 0–32)
AST SERPL-CCNC: 22 IU/L (ref 0–40)
BILIRUB DIRECT SERPL-MCNC: <0.1 MG/DL (ref 0–0.4)
BILIRUB SERPL-MCNC: 0.3 MG/DL (ref 0–1.2)
BUN SERPL-MCNC: 18 MG/DL (ref 8–27)
BUN/CREAT SERPL: 24 (ref 12–28)
CALCIUM SERPL-MCNC: 8.9 MG/DL (ref 8.7–10.3)
CHLORIDE SERPL-SCNC: 95 MMOL/L (ref 96–106)
CO2 SERPL-SCNC: 28 MMOL/L (ref 20–29)
CREAT SERPL-MCNC: 0.74 MG/DL (ref 0.57–1)
EGFRCR SERPLBLD CKD-EPI 2021: 88 ML/MIN/1.73
GLUCOSE SERPL-MCNC: 76 MG/DL (ref 70–99)
POTASSIUM SERPL-SCNC: 4.4 MMOL/L (ref 3.5–5.2)
PROT SERPL-MCNC: 6 G/DL (ref 6–8.5)
SODIUM SERPL-SCNC: 134 MMOL/L (ref 134–144)

## 2023-11-23 LAB
ALBUMIN SERPL-MCNC: 4.1 G/DL (ref 3.9–4.9)
ALP SERPL-CCNC: 47 IU/L (ref 44–121)
ALT SERPL-CCNC: 12 IU/L (ref 0–32)
AST SERPL-CCNC: 22 IU/L (ref 0–40)
BILIRUB DIRECT SERPL-MCNC: <0.1 MG/DL (ref 0–0.4)
BILIRUB SERPL-MCNC: 0.3 MG/DL (ref 0–1.2)
BUN SERPL-MCNC: 18 MG/DL (ref 8–27)
BUN/CREAT SERPL: 24 (ref 12–28)
CALCIUM SERPL-MCNC: 8.9 MG/DL (ref 8.7–10.3)
CHLORIDE SERPL-SCNC: 95 MMOL/L (ref 96–106)
CO2 SERPL-SCNC: 28 MMOL/L (ref 20–29)
CREAT SERPL-MCNC: 0.74 MG/DL (ref 0.57–1)
EGFRCR SERPLBLD CKD-EPI 2021: 88 ML/MIN/1.73
ERYTHROCYTE [DISTWIDTH] IN BLOOD BY AUTOMATED COUNT: 14.7 % (ref 11.7–15.4)
GLUCOSE SERPL-MCNC: 76 MG/DL (ref 70–99)
HCT VFR BLD AUTO: 37.9 % (ref 34–46.6)
HGB BLD-MCNC: 12.7 G/DL (ref 11.1–15.9)
MCH RBC QN AUTO: 29.2 PG (ref 26.6–33)
MCHC RBC AUTO-ENTMCNC: 33.5 G/DL (ref 31.5–35.7)
MCV RBC AUTO: 87 FL (ref 79–97)
PLATELET # BLD AUTO: 181 X10E3/UL (ref 150–450)
POTASSIUM SERPL-SCNC: 4.4 MMOL/L (ref 3.5–5.2)
PROT SERPL-MCNC: 6 G/DL (ref 6–8.5)
RBC # BLD AUTO: 4.35 X10E6/UL (ref 3.77–5.28)
SODIUM SERPL-SCNC: 134 MMOL/L (ref 134–144)
WBC # BLD AUTO: 3.9 X10E3/UL (ref 3.4–10.8)

## 2024-01-25 ENCOUNTER — TELEMEDICINE (OUTPATIENT)
Age: 69
End: 2024-01-25
Payer: MEDICARE

## 2024-01-25 DIAGNOSIS — K75.4 AUTOIMMUNE HEPATITIS (HCC): Primary | ICD-10-CM

## 2024-01-25 PROCEDURE — 3017F COLORECTAL CA SCREEN DOC REV: CPT | Performed by: PHYSICIAN ASSISTANT

## 2024-01-25 PROCEDURE — G8427 DOCREV CUR MEDS BY ELIG CLIN: HCPCS | Performed by: PHYSICIAN ASSISTANT

## 2024-01-25 PROCEDURE — G8400 PT W/DXA NO RESULTS DOC: HCPCS | Performed by: PHYSICIAN ASSISTANT

## 2024-01-25 PROCEDURE — 99213 OFFICE O/P EST LOW 20 MIN: CPT | Performed by: PHYSICIAN ASSISTANT

## 2024-01-25 PROCEDURE — 1090F PRES/ABSN URINE INCON ASSESS: CPT | Performed by: PHYSICIAN ASSISTANT

## 2024-01-25 PROCEDURE — 1123F ACP DISCUSS/DSCN MKR DOCD: CPT | Performed by: PHYSICIAN ASSISTANT

## 2024-01-25 RX ORDER — ASPIRIN 81 MG/1
81 TABLET ORAL DAILY
COMMUNITY

## 2024-01-25 ASSESSMENT — PATIENT HEALTH QUESTIONNAIRE - PHQ9
SUM OF ALL RESPONSES TO PHQ QUESTIONS 1-9: 0
SUM OF ALL RESPONSES TO PHQ QUESTIONS 1-9: 0
SUM OF ALL RESPONSES TO PHQ9 QUESTIONS 1 & 2: 0
2. FEELING DOWN, DEPRESSED OR HOPELESS: 0
1. LITTLE INTEREST OR PLEASURE IN DOING THINGS: 0
SUM OF ALL RESPONSES TO PHQ QUESTIONS 1-9: 0
SUM OF ALL RESPONSES TO PHQ QUESTIONS 1-9: 0

## 2024-01-25 NOTE — PROGRESS NOTES
Identified pt with two pt identifiers(name and ). Reviewed record in preparation for visit and have obtained necessary documentation.  There were no vitals filed for this visit.     Health Maintenance Review: Patient reminded of \"due or due soon\" health maintenance. I have asked the patient to contact his/her primary care provider (PCP) for follow-up on his/her health maintenance.    Coordination of Care Questionnaire:  :   1) Have you been to an emergency room, urgent care, or hospitalized since your last visit?  If yes, where when, and reason for visit? no       2. Have seen or consulted any other health care provider since your last visit?   If yes, where when, and reason for visit?  Yes, Cardiology f/u      Patient is accompanied by self I have received verbal consent from Orcile I Landry to discuss any/all medical information while they are present in the room.    
[place] for management of Autoimmune Hepatitis.  The active problem list, all pertinent past medical history, medications, liver histology, radiologic findings and laboratory findings related to the liver disorder were reviewed and discussed with the patient.      The patient is a 68 y.o. Black female who was found to have elevated liver enzymes in the 2000 range and jaundice in 10/2020.     Serologic evaluation for markers of chronic liver disease was positive for ASMA     A liver biopsy performed 10/2020 demonstrated severe chronic hepatitis with PMN, and stage 3 fibrosis consistent with AIH.    She was treated with prednisone and the liver enzymes came down rapidly.  Azathioprine was added and the liver enzymes were normal in 1/2021.   However, with continued tapering of prednisone below 20 mg the liver enzymes had relapsed.      She was ultimately weaned off of the prednisone in 10/2021.  She then developed diarrhea lasting ~10 weeks with some blood-streaking in 12/2021 and after discussion with hepatology here, discontinued use of azathioprine in 12/2021.  She reports that the diarrhea resolved within one week and she has felt well. She felt that the symptoms/side effects that she had associated with this medication     Labs have been monitored regularly since discontinuation and she has had no elevation of enzymes for the past 2+ years.     The patient is not experiencing the following symptoms of liver disease:  yellowing of the eyes or skin, itching, dark urine, problems concentrating, swelling of the abdomen, swelling of the lower extremities,     The patient completes all daily activities without any functional limitations.    She has been doing Weight Watchers and has lost 10#, she is presently at 155-60#. BP running normal, 117/73 this am.    She is establishing with new PCP 4/2024, anticipate repeat labs at that time.     ASSESSMENT AND PLAN:  Autoimmune hepatitis:   The diagnosis is based on liver

## 2024-03-07 ENCOUNTER — OFFICE VISIT (OUTPATIENT)
Age: 69
End: 2024-03-07
Payer: MEDICARE

## 2024-03-07 VITALS
WEIGHT: 161.8 LBS | HEART RATE: 76 BPM | BODY MASS INDEX: 26.92 KG/M2 | DIASTOLIC BLOOD PRESSURE: 79 MMHG | SYSTOLIC BLOOD PRESSURE: 163 MMHG

## 2024-03-07 DIAGNOSIS — Z01.419 ENCOUNTER FOR GYNECOLOGICAL EXAMINATION: Primary | ICD-10-CM

## 2024-03-07 DIAGNOSIS — Z12.4 CERVICAL CANCER SCREENING: ICD-10-CM

## 2024-03-07 PROCEDURE — 3077F SYST BP >= 140 MM HG: CPT | Performed by: OBSTETRICS & GYNECOLOGY

## 2024-03-07 PROCEDURE — 3078F DIAST BP <80 MM HG: CPT | Performed by: OBSTETRICS & GYNECOLOGY

## 2024-03-07 PROCEDURE — G8484 FLU IMMUNIZE NO ADMIN: HCPCS | Performed by: OBSTETRICS & GYNECOLOGY

## 2024-03-07 PROCEDURE — G0101 CA SCREEN;PELVIC/BREAST EXAM: HCPCS | Performed by: OBSTETRICS & GYNECOLOGY

## 2024-03-07 NOTE — PROGRESS NOTES
Carlos Alberto Landry is a 68 y.o. female returns for an annual exam     Chief Complaint   Patient presents with    Annual Exam     ABN medicare &  insurance    No LMP recorded. Patient is postmenopausal.  Her periods are none d/t post menopausal   She does not have dysmenorrhea.  Problems: no problems  Birth Control: post menopausal status & BTL.  Last Pap: normal 1/15/2021  She does not have a history of ELMA 2, 3 or cervical cancer.   Last Mammogram: had her mammogram today in our office.   Last Bone Density: about 2 years ago osteopenia, done with PCP.   Last colonoscopy: 2018 & scheduled for later this month.     Examination chaperoned by Viktoria Pickett MA.  
    Types: 1 Glasses of wine per week     Comment: On special occasions only    Drug use: Never    Sexual activity: Yes     Partners: Male     Birth control/protection: Post-menopausal, Female Sterilization     Comment: only , BTL   Other Topics Concern    Not on file   Social History Narrative    Not on file     Social Determinants of Health     Financial Resource Strain: Not on file   Food Insecurity: Not on file   Transportation Needs: Not on file   Physical Activity: Not on file   Stress: Not on file   Social Connections: Not on file   Intimate Partner Violence: Not on file   Housing Stability: Not on file     Tobacco History:  reports that she has never smoked. She has never used smokeless tobacco.  Alcohol Abuse:  reports that she does not currently use alcohol after a past usage of about 1.0 standard drink of alcohol per week.  Drug Abuse:  reports no history of drug use.  Allergies:   Allergies   Allergen Reactions    Shellfish Allergy Rash    Sulfa Antibiotics Rash     Patient Active Problem List   Diagnosis    Hypertension    Autoimmune hepatitis (HCC)    Benign essential hypertension    Chronic sinusitis    Fuchs' corneal dystrophy    Left bundle branch block    Memory impairment    Overweight       Review of Systems - History obtained from the patient and patient filled out questionnaire   Constitutional/general, HEENT, CV, Resp, GI, MSK, Neuro, Psych, Heme/lymph, Skin, Breast ROS: no significant complaints except as noted on HPI    Physical Exam  BP (!) 150/70   Pulse 76   Wt 73.4 kg (161 lb 12.8 oz)   BMI 26.92 kg/m²     Constitutional  Appearance: well-nourished, well developed, alert, in no acute distress    HENT  Head and Face: appears normal    Neck  Inspection/Palpation: normal appearance, no masses or tenderness  Lymph Nodes: no lymphadenopathy present  Thyroid: gland size normal, nontender, no nodules or masses present on palpation    Chest  Respiratory Effort: breathing

## 2024-03-08 ENCOUNTER — TELEPHONE (OUTPATIENT)
Age: 69
End: 2024-03-08

## 2024-03-08 NOTE — TELEPHONE ENCOUNTER
SPK WITH PATIENT CONCERNING HER MEDICARE COVERAGE FOR HER ANNUAL EXAM AND PAP SMEAR AND MAMMOGRAM. I LET HER KNOW THAT MEDICARE PAYS FOR HER MAMMOGRAM EVERY 12 MONTHS. MEDICARE PAYS FOR ANNUAL EXAM AND PAP SMEAR EVERY 24 MONTHS AND 1 DAY. MEDICARE PAID FOR HER ANNUAL EXAM LAST YEAR SO THEY WILL NOT COVER THIS YEAR UNLESS SHE IS CONSIDERED HIGH RISK. I ASKED HER IF SHE SIGNED THE ABN IN THE OFFICE YESTERDAY AND SHE STATED THAT SHE SIGNED SOMETHING. SHE THANKED ME FOR EXPLAINING TO  HER MEDICARE BENEFITS.

## 2024-03-12 LAB
., LABCORP: NORMAL
CYTOLOGIST CVX/VAG CYTO: NORMAL
CYTOLOGY CVX/VAG DOC CYTO: NORMAL
CYTOLOGY CVX/VAG DOC THIN PREP: NORMAL
DX ICD CODE: NORMAL
Lab: NORMAL
OTHER STN SPEC: NORMAL
STAT OF ADQ CVX/VAG CYTO-IMP: NORMAL

## 2024-03-26 ENCOUNTER — HOSPITAL ENCOUNTER (OUTPATIENT)
Facility: HOSPITAL | Age: 69
Setting detail: OUTPATIENT SURGERY
Discharge: HOME | End: 2024-03-26
Attending: SPECIALIST | Admitting: SPECIALIST
Payer: MEDICARE

## 2024-03-26 ENCOUNTER — ANESTHESIA (OUTPATIENT)
Facility: HOSPITAL | Age: 69
End: 2024-03-26
Payer: MEDICARE

## 2024-03-26 ENCOUNTER — ANESTHESIA EVENT (OUTPATIENT)
Facility: HOSPITAL | Age: 69
End: 2024-03-26
Payer: MEDICARE

## 2024-03-26 VITALS
HEIGHT: 66 IN | HEART RATE: 65 BPM | TEMPERATURE: 97.2 F | DIASTOLIC BLOOD PRESSURE: 70 MMHG | RESPIRATION RATE: 16 BRPM | BODY MASS INDEX: 26.52 KG/M2 | SYSTOLIC BLOOD PRESSURE: 120 MMHG | OXYGEN SATURATION: 100 %

## 2024-03-26 PROCEDURE — 3700000001 HC ADD 15 MINUTES (ANESTHESIA): Performed by: SPECIALIST

## 2024-03-26 PROCEDURE — 7100000011 HC PHASE II RECOVERY - ADDTL 15 MIN: Performed by: SPECIALIST

## 2024-03-26 PROCEDURE — 3600007502: Performed by: SPECIALIST

## 2024-03-26 PROCEDURE — 7100000010 HC PHASE II RECOVERY - FIRST 15 MIN: Performed by: SPECIALIST

## 2024-03-26 PROCEDURE — 3600007512: Performed by: SPECIALIST

## 2024-03-26 PROCEDURE — 2580000003 HC RX 258: Performed by: SPECIALIST

## 2024-03-26 PROCEDURE — 6360000002 HC RX W HCPCS: Performed by: NURSE ANESTHETIST, CERTIFIED REGISTERED

## 2024-03-26 PROCEDURE — 3700000000 HC ANESTHESIA ATTENDED CARE: Performed by: SPECIALIST

## 2024-03-26 RX ORDER — MULTIVIT WITH MINERALS/LUTEIN
250 TABLET ORAL DAILY
COMMUNITY

## 2024-03-26 RX ORDER — SODIUM CHLORIDE 0.9 % (FLUSH) 0.9 %
5-40 SYRINGE (ML) INJECTION PRN
Status: DISCONTINUED | OUTPATIENT
Start: 2024-03-26 | End: 2024-03-26 | Stop reason: HOSPADM

## 2024-03-26 RX ORDER — NICOTINE 14MG/24HR
PATCH, TRANSDERMAL 24 HOURS TRANSDERMAL
COMMUNITY

## 2024-03-26 RX ORDER — SIMETHICONE 40MG/0.6ML
40 SUSPENSION, DROPS(FINAL DOSAGE FORM)(ML) ORAL AS NEEDED
Status: DISCONTINUED | OUTPATIENT
Start: 2024-03-26 | End: 2024-03-26 | Stop reason: HOSPADM

## 2024-03-26 RX ORDER — PROPOFOL 10 MG/ML
INJECTION, EMULSION INTRAVENOUS CONTINUOUS PRN
Status: DISCONTINUED | OUTPATIENT
Start: 2024-03-26 | End: 2024-03-26 | Stop reason: SDUPTHER

## 2024-03-26 RX ORDER — SODIUM CHLORIDE 9 MG/ML
INJECTION, SOLUTION INTRAVENOUS CONTINUOUS
Status: DISCONTINUED | OUTPATIENT
Start: 2024-03-26 | End: 2024-03-26 | Stop reason: HOSPADM

## 2024-03-26 RX ADMIN — PROPOFOL 30 MG: 10 INJECTION, EMULSION INTRAVENOUS at 11:29

## 2024-03-26 RX ADMIN — PROPOFOL 140 MCG/KG/MIN: 10 INJECTION, EMULSION INTRAVENOUS at 11:25

## 2024-03-26 RX ADMIN — PROPOFOL 70 MG: 10 INJECTION, EMULSION INTRAVENOUS at 11:26

## 2024-03-26 RX ADMIN — SODIUM CHLORIDE: 9 INJECTION, SOLUTION INTRAVENOUS at 11:22

## 2024-03-26 RX ADMIN — PROPOFOL 20 MG: 10 INJECTION, EMULSION INTRAVENOUS at 11:31

## 2024-03-26 ASSESSMENT — PAIN - FUNCTIONAL ASSESSMENT: PAIN_FUNCTIONAL_ASSESSMENT: 0-10

## 2024-03-26 NOTE — PROGRESS NOTES
Endoscopy discharge instructions have been reviewed and given to patient.  The patient verbalized understanding and acceptance of instructions.      Dr. Massey discussed with spouse procedure findings and next steps.

## 2024-03-26 NOTE — ANESTHESIA PRE PROCEDURE
Department of Anesthesiology  Preprocedure Note       Name:  Carlos Alberto Landry   Age:  68 y.o.  :  1955                                          MRN:  [id number]         Date:  3/26/2024      Surgeon: Surgeon(s):  Omar Massey MD    Procedure: Procedure(s):  COLONOSCOPY    Medications prior to admission:   Prior to Admission medications    Medication Sig Start Date End Date Taking? Authorizing Provider   Ascorbic Acid (VITAMIN C) 250 MG tablet Take 1 tablet by mouth daily   Yes Tanya Garcia MD   Saccharomyces boulardii (PROBIOTIC) 250 MG CAPS Take by mouth   Yes Tanya Garcia MD   Turmeric (QC TUMERIC COMPLEX PO) Take by mouth    Tanya Garcia MD   MULTIPLE VITAMIN PO Take by mouth    Tanya Garcia MD   GARLIC PO Take by mouth    Tanya Garcia MD   aspirin 81 MG EC tablet Take 1 tablet by mouth daily    Tanya Garcia MD   Omega-3 Fatty Acids (FISH OIL) 1000 MG capsule Take by mouth    Tanya Garcia MD   Zinc 30 MG TABS Take 30 mg by mouth  Patient not taking: Reported on 2024    Tanya Garcia MD   calcium carbonate 1500 (600 Ca) MG TABS tablet Take by mouth daily    Automatic Reconciliation, Ar   Cholecalciferol 50 MCG (2000 UT) CAPS Take by mouth daily    Automatic Reconciliation, Ar   folic acid (FOLVITE) 1 MG tablet ceived the following from [facility]: Outside name: folic acid (FOLVITE) 1 mg tablet 21   Automatic Reconciliation, Ar   losartan (COZAAR) 25 MG tablet 12.5 mg. 21   Automatic Reconciliation, Ar       Current medications:    No current facility-administered medications for this encounter.       Allergies:    Allergies   Allergen Reactions    Shellfish Allergy Rash    Sulfa Antibiotics Rash       Problem List:    Patient Active Problem List   Diagnosis Code    Hypertension I10    Autoimmune hepatitis (HCC) K75.4    Benign essential hypertension I10    Chronic sinusitis J32.9    Fuchs' corneal

## 2024-03-26 NOTE — ANESTHESIA POSTPROCEDURE EVALUATION
Department of Anesthesiology  Postprocedure Note    Patient: Carlos Alberto Landry  MRN: [id number]  YOB: 1955  Date of evaluation: 3/26/2024    Procedure Summary       Date: 03/26/24 Room / Location: [facility] 03 / [facility] ENDOSCOPY    Anesthesia Start: 1122 Anesthesia Stop: 1146    Procedure: COLONOSCOPY (Lower GI Region) Diagnosis:       Personal history of colonic polyps      (Personal history of colonic polyps [Z86.010])    Surgeons: Omar Massey MD Responsible Provider: Omar Hou MD    Anesthesia Type: MAC ASA Status: 2            Anesthesia Type: MAC    Mauri Phase I: Mauri Score: 10    Mauri Phase II: Mauri Score: 9    Anesthesia Post Evaluation    Patient location during evaluation: PACU  Patient participation: complete - patient participated  Level of consciousness: awake  Pain score: 0  Airway patency: patent  Nausea & Vomiting: no nausea and no vomiting  Cardiovascular status: blood pressure returned to baseline  Respiratory status: acceptable  Hydration status: euvolemic  Pain management: adequate    No notable events documented.

## 2024-03-26 NOTE — DISCHARGE INSTRUCTIONS
[facility]  [facility]  Omar Schaffer MD  [phone]      March 26, 2024    Carlos Alberto Landry  YOB: 1955    COLONOSCOPY DISCHARGE INSTRUCTIONS    If there is redness at IV site you should apply warm compress to area.  If redness or soreness persist contact Dr. Schaffer' or your primary care doctor.    There may be a slight amount of blood passed from the rectum.  Gaseous discomfort may develop, but walking, belching will help relieve this.  You may not operate a vehicle for 12 hours  You may not operate machinery or dangerous appliances for rest of today  You may not drink alcoholic beverages for 12 hours  Avoid making any critical decisions for 24 hours    DIET:  You may resume your normal diet, but some patients find that heavy or large meals may lead to indigestion or vomiting.  I suggest a light meal as first food intake.    MEDICATIONS:  The use of some over-the-counter pain medication may lead to bleeding after colon biopsies or polyp removal.  Tylenol (also called acetaminophen) is safe to take even if you have had colonoscopy with polyp removal.  Based on the procedure you had today you may safely take aspirin or aspirin-like products for the next ten (10) days.  Remember that Tylenol (also called acetaminophen) is safe to take after colonoscopy even if you have had biopsies or polyps removed.    ACTIVITY:  You may resume your normal household activities, but it is recommended that you spend the remainder of the day resting -  avoid any strenuous activity.    CALL DR. SCHAFFER' OFFICE IF:  Increasing pain, nausea, vomiting  Abdominal distension (swelling)  Significant new or increased bleeding (oral or rectal)  Fever/Chills  Chest pain/shortness of breath                       Additional instructions:   Great news, no polyps and no colon cancer.  Next colonoscopy 10 years.     It was an honor to be your doctor today.

## 2024-03-26 NOTE — H&P
Pre-Endoscopy H&P Update  Chief complaint/HPI/ROS:  The indication for the procedure, the patient's history and the patient's current medications are reviewed prior to the procedure and that data is reported on the H&P to which this document is attached.  Any significant complaints with regard to organ systems will be noted, and if not mentioned then a review of systems is not contributory.  Past Medical History:   Diagnosis Date    Bilateral dry eyes     H/O mammogram 03/02/2023    low risk    H/O mammogram 03/08/2024    low risk    Herpes simplex     Includes herpes labialis    History of mammogram 10/13/2016; 11/13/18; 01/03/2020    negative; Negative; negative birads 1 dense    Hx of mammogram 09/30/14; 10/1/15    normal; negative    Hx of mammogram 01/15/2021; 2/16/22    BI-RADS 1: Negative; BI-RADS 1: Negative    Hyperlipidemia     Hypertension     Pap smear for cervical cancer screening 1/15/21, 9/14/10; 9/18/12; 10/1/15; 11/13/18    Negative, HPV negative; Negative; Negative, HPV negative; Neg/Neg HPV    Pap smear for cervical cancer screening 03/04/2024    normal; hpv not tested    Reflux 10/03/2020    Used Prilosec 1 week and altered diet      Past Surgical History:   Procedure Laterality Date    BIOPSY LIVER  2020    COLONOSCOPY N/A 12/05/2018    COLONOSCOPY performed by Omar Massey MD at [facility] ENDOSCOPY    COLONOSCOPY  2006    Wnl    GYN  1982    tubal ligation    LAP,TUBAL CAUTERY      US GUIDED LIVER BIOPSY PERCUTANEOUS  10/22/2020    US GUIDED LIVER BIOPSY PERCUTANEOUS 10/22/2020 [facility] RAD US     Social   Social History     Tobacco Use    Smoking status: Never    Smokeless tobacco: Never   Substance Use Topics    Alcohol use: Not Currently     Alcohol/week: 1.0 standard drink of alcohol     Types: 1 Glasses of wine per week     Comment: On special occasions only      Family History   Problem Relation Age of Onset    Hypertension Mother     Osteoarthritis Mother     Cancer Mother 90

## 2024-03-26 NOTE — H&P
Below note is 3 years old.  Today's visit is polyp surveillance colonoscopy after procedure 2018 and during that procedure a sessile serrated adenoma was removed.  Omar Massey MD                         Date: 12/15/2021 1:15 PM   Patient Name: Carlos Alberto Landry   Account #: [id number]    Gender: Female    (age): 1955 (66)      Provider: Gertrude Lang NP      Referring Physician: Stacey Smith MD  [address] [place] [address] [phone] (phone)  [phone] (fax)      Chief Complaint:          History of Present Illness:   Went to the ER this past friday for bright red blood in stool, this has been going on for about a week intermittently.  She has ha history of autoimmune hepatitis and was on steroids and Imuran for about a year.  She has also been having occasional diarrhea depending on what she eats, usually she has a \"pile of soft stool\"  once or twice in the morning.  She is not aware of any hemorrhoids.  Occasionally she has some lower abd pain that is relieved  with BM. She is concerned about seeing the bleeding and she is worried that it may be because of the medication that she has been on.  She is requesting a colonoscopy.  She is not on any blood thinners, her only medication is vitamins.  She eats very healthy and drinks plenty of water.  she is also requesting stool studies that were ordered in the ER but never collected.      Past Medical History      Medical Conditions: Arthritis  Autoimmune Hepatitis  High blood pressure  Liver disease   Surgical Procedures: Tubal Ligation,    Dx Studies: Colonoscopy, 2018  CT Scan  Liver Biopsy, 10/22/2020   Medications: calcium carbonate 600 mg calcium (1,500 mg) Take 1 tablet by mouth once a day as directed  cholecalciferol (vitamin D3) 50 mcg (2,000 unit) Take 1 capsule by mouth once a day as directed  Fish Oil 300-1,000 mg Take 1 capsule by mouth once a day as directed  losartan-hydrochlorothiazide 50-12.5 mg Take 1

## 2024-03-26 NOTE — OP NOTE
[facility]  [facility]  Omar Massey MD  [phone]      2024    Colonoscopy Procedure Note  Carlos Alberto Landry  :  1955  [place] Medical Record Number: [id number]    Indications:     Personal history of colon polyps (screening only)  PCP:  Roseann Graham MD  Anesthesia/Sedation: Conscious Sedation/Moderate Sedation/GETA, see notes  Endoscopist:  Dr. Omar Massey  Complications:  None  Estimated Blood Loss:  None    Permit:  The indications, risks, benefits and alternatives were reviewed with the patient or their decision maker who was provided an opportunity to ask questions and all questions were answered.  The specific risks of colonoscopy with conscious sedation were reviewed, including but not limited to anesthetic complication, bleeding, adverse drug reaction, missed lesion, infection, IV site reactions, and intestinal perforation which would lead to the need for surgical repair.  Alternatives to colonoscopy including radiographic imaging, observation without testing, or laboratory testing were reviewed including the limitations of those alternatives.  After considering the options and having all their questions answered, the patient or their decision maker provided both verbal and written consent to proceed.        Procedure in Detail:  After obtaining informed consent, positioning of the patient in the left lateral decubitus position, and conduction of a pre-procedure pause or \"time out\" the endoscope was introduced into the anus and advanced to the cecum, which was identified by the ileocecal valve and appendiceal orifice.  The quality of the colonic preparation was good.  A careful inspection was made as the colonoscope was withdrawn, findings and interventions are described below.    Findings:   normal    Specimens:    none    Complications:   None; patient tolerated the procedure

## 2024-03-26 NOTE — PROGRESS NOTES

## 2024-04-21 ENCOUNTER — HOSPITAL ENCOUNTER (EMERGENCY)
Facility: HOSPITAL | Age: 69
Discharge: HOME | End: 2024-04-21
Attending: EMERGENCY MEDICINE
Payer: MEDICARE

## 2024-04-21 VITALS
WEIGHT: 156 LBS | SYSTOLIC BLOOD PRESSURE: 160 MMHG | DIASTOLIC BLOOD PRESSURE: 89 MMHG | HEIGHT: 65 IN | OXYGEN SATURATION: 97 % | BODY MASS INDEX: 25.99 KG/M2 | TEMPERATURE: 97.9 F | RESPIRATION RATE: 18 BRPM | HEART RATE: 73 BPM

## 2024-04-21 DIAGNOSIS — I10 PRIMARY HYPERTENSION: Primary | ICD-10-CM

## 2024-04-21 LAB
ALBUMIN SERPL-MCNC: 3.6 G/DL (ref 3.5–5)
ALBUMIN/GLOB SERPL: 1.1 (ref 1.1–2.2)
ALP SERPL-CCNC: 47 U/L (ref 45–117)
ALT SERPL-CCNC: 20 U/L (ref 12–78)
ANION GAP SERPL CALC-SCNC: 10 MMOL/L (ref 5–15)
APPEARANCE UR: CLEAR
AST SERPL-CCNC: 37 U/L (ref 15–37)
BACTERIA URNS QL MICRO: NEGATIVE /HPF
BASOPHILS # BLD: 0 K/UL (ref 0–0.1)
BASOPHILS NFR BLD: 1 % (ref 0–1)
BILIRUB SERPL-MCNC: 0.5 MG/DL (ref 0.2–1)
BILIRUB UR QL: NEGATIVE
BUN SERPL-MCNC: 17 MG/DL (ref 6–20)
BUN/CREAT SERPL: 21 (ref 12–20)
CALCIUM SERPL-MCNC: 8.5 MG/DL (ref 8.5–10.1)
CHLORIDE SERPL-SCNC: 89 MMOL/L (ref 97–108)
CO2 SERPL-SCNC: 27 MMOL/L (ref 21–32)
COLOR UR: ABNORMAL
COMMENT:: NORMAL
CREAT SERPL-MCNC: 0.81 MG/DL (ref 0.55–1.02)
DIFFERENTIAL METHOD BLD: ABNORMAL
EOSINOPHIL # BLD: 0.2 K/UL (ref 0–0.4)
EOSINOPHIL NFR BLD: 3 % (ref 0–7)
EPITH CASTS URNS QL MICRO: ABNORMAL /LPF
ERYTHROCYTE [DISTWIDTH] IN BLOOD BY AUTOMATED COUNT: 13 % (ref 11.5–14.5)
GLOBULIN SER CALC-MCNC: 3.2 G/DL (ref 2–4)
GLUCOSE SERPL-MCNC: 100 MG/DL (ref 65–100)
GLUCOSE UR STRIP.AUTO-MCNC: NEGATIVE MG/DL
HCT VFR BLD AUTO: 36.2 % (ref 35–47)
HGB BLD-MCNC: 13.5 G/DL (ref 11.5–16)
HGB UR QL STRIP: NEGATIVE
HYALINE CASTS URNS QL MICRO: ABNORMAL /LPF (ref 0–2)
IMM GRANULOCYTES # BLD AUTO: 0 K/UL (ref 0–0.04)
IMM GRANULOCYTES NFR BLD AUTO: 0 % (ref 0–0.5)
KETONES UR QL STRIP.AUTO: NEGATIVE MG/DL
LEUKOCYTE ESTERASE UR QL STRIP.AUTO: ABNORMAL
LYMPHOCYTES # BLD: 2.8 K/UL (ref 0.8–3.5)
LYMPHOCYTES NFR BLD: 43 % (ref 12–49)
MCH RBC QN AUTO: 29.8 PG (ref 26–34)
MCHC RBC AUTO-ENTMCNC: 37.3 G/DL (ref 30–36.5)
MCV RBC AUTO: 79.9 FL (ref 80–99)
MONOCYTES # BLD: 0.7 K/UL (ref 0–1)
MONOCYTES NFR BLD: 10 % (ref 5–13)
NEUTS SEG # BLD: 2.9 K/UL (ref 1.8–8)
NEUTS SEG NFR BLD: 43 % (ref 32–75)
NITRITE UR QL STRIP.AUTO: NEGATIVE
NRBC # BLD: 0 K/UL (ref 0–0.01)
NRBC BLD-RTO: 0 PER 100 WBC
PH UR STRIP: 7 (ref 5–8)
PLATELET # BLD AUTO: 183 K/UL (ref 150–400)
PMV BLD AUTO: 10.3 FL (ref 8.9–12.9)
POTASSIUM SERPL-SCNC: 4.5 MMOL/L (ref 3.5–5.1)
PROT SERPL-MCNC: 6.8 G/DL (ref 6.4–8.2)
PROT UR STRIP-MCNC: NEGATIVE MG/DL
RBC # BLD AUTO: 4.53 M/UL (ref 3.8–5.2)
RBC #/AREA URNS HPF: ABNORMAL /HPF (ref 0–5)
SODIUM SERPL-SCNC: 126 MMOL/L (ref 136–145)
SP GR UR REFRACTOMETRY: 1 (ref 1–1.03)
SPECIMEN HOLD: NORMAL
SPECIMEN HOLD: NORMAL
TROPONIN I SERPL HS-MCNC: 5 NG/L (ref 0–51)
UROBILINOGEN UR QL STRIP.AUTO: 0.2 EU/DL (ref 0.2–1)
WBC # BLD AUTO: 6.7 K/UL (ref 3.6–11)
WBC URNS QL MICRO: ABNORMAL /HPF (ref 0–4)

## 2024-04-21 PROCEDURE — 80053 COMPREHEN METABOLIC PANEL: CPT

## 2024-04-21 PROCEDURE — 36415 COLL VENOUS BLD VENIPUNCTURE: CPT

## 2024-04-21 PROCEDURE — 85025 COMPLETE CBC W/AUTO DIFF WBC: CPT

## 2024-04-21 PROCEDURE — 84484 ASSAY OF TROPONIN QUANT: CPT

## 2024-04-21 PROCEDURE — 99283 EMERGENCY DEPT VISIT LOW MDM: CPT

## 2024-04-21 PROCEDURE — 81001 URINALYSIS AUTO W/SCOPE: CPT

## 2024-04-21 ASSESSMENT — LIFESTYLE VARIABLES
HOW OFTEN DO YOU HAVE A DRINK CONTAINING ALCOHOL: NEVER
HOW MANY STANDARD DRINKS CONTAINING ALCOHOL DO YOU HAVE ON A TYPICAL DAY: PATIENT DOES NOT DRINK

## 2024-04-21 ASSESSMENT — PAIN - FUNCTIONAL ASSESSMENT: PAIN_FUNCTIONAL_ASSESSMENT: 0-10

## 2024-04-21 ASSESSMENT — PAIN SCALES - GENERAL: PAINLEVEL_OUTOF10: 5

## 2024-04-21 NOTE — ED TRIAGE NOTES
Patient arrives to the ED via POV with complaints of a headache that started at 1330. Patient reports checking BP and SBP was in 160's.

## 2024-04-21 NOTE — ED PROVIDER NOTES
[facility] EMERGENCY DEPT  EMERGENCY DEPARTMENT ENCOUNTER      Pt Name: Carlos Alberto Landry  MRN: [id number]  Birthdate 1955  Date of evaluation: 4/21/2024  Provider: NATHANAEL ROMERO    CHIEF COMPLAINT       Chief Complaint   Patient presents with    Hypertension         HISTORY OF PRESENT ILLNESS   (Location/Symptom, Timing/Onset, Context/Setting, Quality, Duration, Modifying Factors, Severity)  Note limiting factors.   68-year-old female presents to ED with elevated blood pressure.  Patient reports that she recently was prescribed saline eyedrops by her ophthalmologist and reports she feels like ever since she has been using these her blood pressure has been higher than usual.  She used to take half of a tablet of 25 mg losartan daily and her blood pressure was stable at systolic 120s but reports about a month ago she noticed her blood pressure was trending up when she was checking at home so she started taking a full tab instead.  She told her PCP this who agreed and she has been taking 25 mg of losartan daily since with improvement.  She reports last night she started noticing headache and checked her blood pressure with systolic in 160s.  She took an extra half tablet of losartan and then this morning the headache continued and so she took an extra full tablet for total of 50 mg of losartan.  She reports she became concerned when her blood pressure continued to be elevated as she checked it at home so decided to come in.  She has not told her PCP about this.  Denies any chest pain, shortness of breath, numbness, tingling, vision changes, nausea, vomiting or diarrhea.  She notes she had cataract surgery last week.    The history is provided by the patient.         Review of External Medical Records:     Nursing Notes were reviewed.    REVIEW OF SYSTEMS    (2-9 systems for level 4, 10 or more for level 5)     Review of Systems   Constitutional:  Negative for chills and fever.   HENT:  Negative for congestion,

## 2024-07-17 LAB
ALBUMIN SERPL-MCNC: 4.4 G/DL (ref 3.9–4.9)
ALP SERPL-CCNC: 53 IU/L (ref 44–121)
ALT SERPL-CCNC: 16 IU/L (ref 0–32)
AST SERPL-CCNC: 27 IU/L (ref 0–40)
BILIRUB DIRECT SERPL-MCNC: 0.12 MG/DL (ref 0–0.4)
BILIRUB SERPL-MCNC: 0.4 MG/DL (ref 0–1.2)
BUN SERPL-MCNC: 20 MG/DL (ref 8–27)
BUN/CREAT SERPL: 24 (ref 12–28)
CALCIUM SERPL-MCNC: 9.2 MG/DL (ref 8.7–10.3)
CHLORIDE SERPL-SCNC: 92 MMOL/L (ref 96–106)
CO2 SERPL-SCNC: 23 MMOL/L (ref 20–29)
CREAT SERPL-MCNC: 0.84 MG/DL (ref 0.57–1)
EGFRCR SERPLBLD CKD-EPI 2021: 76 ML/MIN/1.73
ERYTHROCYTE [DISTWIDTH] IN BLOOD BY AUTOMATED COUNT: 14.4 % (ref 11.7–15.4)
GLUCOSE SERPL-MCNC: 80 MG/DL (ref 70–99)
HCT VFR BLD AUTO: 40.9 % (ref 34–46.6)
HGB BLD-MCNC: 13.9 G/DL (ref 11.1–15.9)
MCH RBC QN AUTO: 29.6 PG (ref 26.6–33)
MCHC RBC AUTO-ENTMCNC: 34 G/DL (ref 31.5–35.7)
MCV RBC AUTO: 87 FL (ref 79–97)
PLATELET # BLD AUTO: 196 X10E3/UL (ref 150–450)
POTASSIUM SERPL-SCNC: 4.6 MMOL/L (ref 3.5–5.2)
PROT SERPL-MCNC: 6.5 G/DL (ref 6–8.5)
RBC # BLD AUTO: 4.7 X10E6/UL (ref 3.77–5.28)
SODIUM SERPL-SCNC: 134 MMOL/L (ref 134–144)
WBC # BLD AUTO: 5 X10E3/UL (ref 3.4–10.8)

## 2024-08-27 ENCOUNTER — OFFICE VISIT (OUTPATIENT)
Age: 69
End: 2024-08-27
Payer: MEDICARE

## 2024-08-27 VITALS
WEIGHT: 163.2 LBS | DIASTOLIC BLOOD PRESSURE: 63 MMHG | BODY MASS INDEX: 27.19 KG/M2 | SYSTOLIC BLOOD PRESSURE: 116 MMHG | HEART RATE: 70 BPM | HEIGHT: 65 IN | OXYGEN SATURATION: 96 % | RESPIRATION RATE: 17 BRPM | TEMPERATURE: 97.8 F

## 2024-08-27 DIAGNOSIS — K75.4 AUTOIMMUNE HEPATITIS (HCC): Primary | ICD-10-CM

## 2024-08-27 PROCEDURE — 1123F ACP DISCUSS/DSCN MKR DOCD: CPT | Performed by: PHYSICIAN ASSISTANT

## 2024-08-27 PROCEDURE — 1036F TOBACCO NON-USER: CPT | Performed by: PHYSICIAN ASSISTANT

## 2024-08-27 PROCEDURE — G8400 PT W/DXA NO RESULTS DOC: HCPCS | Performed by: PHYSICIAN ASSISTANT

## 2024-08-27 PROCEDURE — 1090F PRES/ABSN URINE INCON ASSESS: CPT | Performed by: PHYSICIAN ASSISTANT

## 2024-08-27 PROCEDURE — 91200 LIVER ELASTOGRAPHY: CPT | Performed by: PHYSICIAN ASSISTANT

## 2024-08-27 PROCEDURE — 99214 OFFICE O/P EST MOD 30 MIN: CPT | Performed by: PHYSICIAN ASSISTANT

## 2024-08-27 PROCEDURE — 3078F DIAST BP <80 MM HG: CPT | Performed by: PHYSICIAN ASSISTANT

## 2024-08-27 PROCEDURE — 3074F SYST BP LT 130 MM HG: CPT | Performed by: PHYSICIAN ASSISTANT

## 2024-08-27 PROCEDURE — 3017F COLORECTAL CA SCREEN DOC REV: CPT | Performed by: PHYSICIAN ASSISTANT

## 2024-08-27 PROCEDURE — G8419 CALC BMI OUT NRM PARAM NOF/U: HCPCS | Performed by: PHYSICIAN ASSISTANT

## 2024-08-27 PROCEDURE — G8427 DOCREV CUR MEDS BY ELIG CLIN: HCPCS | Performed by: PHYSICIAN ASSISTANT

## 2024-08-27 RX ORDER — LOSARTAN POTASSIUM AND HYDROCHLOROTHIAZIDE 12.5; 5 MG/1; MG/1
TABLET ORAL
COMMUNITY

## 2024-08-27 RX ORDER — KETOROLAC TROMETHAMINE 5 MG/ML
SOLUTION OPHTHALMIC
COMMUNITY
Start: 2024-03-19

## 2024-08-27 ASSESSMENT — PATIENT HEALTH QUESTIONNAIRE - PHQ9
SUM OF ALL RESPONSES TO PHQ QUESTIONS 1-9: 0
DEPRESSION UNABLE TO ASSESS: FUNCTIONAL CAPACITY MOTIVATION LIMITS ACCURACY
SUM OF ALL RESPONSES TO PHQ QUESTIONS 1-9: 0
SUM OF ALL RESPONSES TO PHQ9 QUESTIONS 1 & 2: 0
SUM OF ALL RESPONSES TO PHQ QUESTIONS 1-9: 0
SUM OF ALL RESPONSES TO PHQ QUESTIONS 1-9: 0
2. FEELING DOWN, DEPRESSED OR HOPELESS: NOT AT ALL
1. LITTLE INTEREST OR PLEASURE IN DOING THINGS: NOT AT ALL

## 2024-08-27 NOTE — PROGRESS NOTES
[facility]      Gagandeep Prieto MD, FACP, FACG, FAASLD      Doreen Goodman, PA-C    Mary Jenkins, [facility]   Latasha Maldonadoblancakristen, [facility]   Noelle Rachid, [facility]  Corona French, [facility]   Meme Madrigal, [facility]   Sherie Arcadio, [facility]   [address], [address]   [place]  [address] [phone]   FAX: [phone]  Critical access hospital   [address], [address]   [place]  [address] [phone]   FAX: [phone]     Patient Care Team:  Roseann Graham MD as PCP - General (Family Medicine)  Roseann Graham MD as [facility]  Lizzeth Ramirez MD as Physician  Kushal Davis MD as Consulting Physician (Cardiothoracic Surgery)    Patient Active Problem List   Diagnosis    Hypertension    Autoimmune hepatitis (HCC)    Benign essential hypertension    Chronic sinusitis    Fuchs' corneal dystrophy    Left bundle branch block    Memory impairment    Overweight     Orcile I Landry is being seen at [facility] for management of Autoimmune Hepatitis.  The active problem list, all pertinent past medical history, medications, liver histology, radiologic findings and laboratory findings related to the liver disorder were reviewed and discussed with the patient.      The patient is a 69 y.o. Black female who was found to have elevated liver enzymes in the 2000 range and jaundice in 10/2020.     Serologic evaluation for markers of chronic liver disease was positive for ASMA     A liver biopsy performed 10/2020 demonstrated severe chronic hepatitis with PMN, and stage 3 fibrosis consistent with AIH.    She was treated with prednisone and the liver enzymes came down rapidly.  Azathioprine was added and the liver enzymes were normal in 1/2021.   However, with continued  diverticular disease, or mucosal abnormality.     RADIOLOGY:  10/2020: MRI/MRCP liver: .  Small indeterminate hepatic lesion is of questionable significance. A  sclerosing hemangioma is favored.  10/2020: CT scan WO Liver: Periportal edema vs intrahepatic biliary ductal dilation  12/2021. CT abdomen. Unchanged small indeterminate lesion in segment 5 of the liver. Sclerosing  hemangioma is favored.  12/2022.  Ultrasound of liver.  Echogenic consistent with chronic liver disease.  No liver mass lesions.  The segment 5 lesion is not seen on this study, consider CT.  8/2023. CT abdomen.  12 mm segment 5 liver lesion, unchanged, likely a hemangioma. No suspicious liver lesion. Constipation without colitis.    OTHER TESTING:  Not available or performed    FOLLOW-UP:  All of the issues listed above in the Assessment and Plan were discussed with the patient.  All questions were answered.  The patient expressed a clear understanding of the above.    Follow-up [facility] in prn only.     Documentation reviewed and updated to reflect current, accurate patient information.    Doreen Goodman PA-C  [facility]  [address] [place] [address], [address]  [place]  [address] [phone]  [facility]

## 2024-08-27 NOTE — PROGRESS NOTES
Identified pt with two pt identifiers(name and ). Reviewed record in preparation for visit and have obtained necessary documentation.  Vitals:    24 1032 24 1034   BP: (!) 142/67 116/63   Site: Left Upper Arm Left Upper Arm   Position: Sitting Sitting   Cuff Size: Medium Adult Medium Adult   Pulse: 70    Resp: 17    Temp: 97.8 °F (36.6 °C)    TempSrc: Temporal    SpO2: 96%    Weight: 74 kg (163 lb 3.2 oz)    Height: 1.651 m (5' 5\")         Health Maintenance Review: Patient reminded of \"due or due soon\" health maintenance. I have asked the patient to contact his/her primary care provider (PCP) for follow-up on his/her health maintenance.    Coordination of Care Questionnaire:  :   1) Have you been to an emergency room, urgent care, or hospitalized since your last visit?  If yes, where when, and reason for visit? no       2. Have seen or consulted any other health care provider since your last visit?   If yes, where when, and reason for visit?  No      Patient is accompanied by self I have received verbal consent from Orcile I Landry to discuss any/all medical information while they are present in the room.

## 2024-10-08 NOTE — PROGRESS NOTES
Owen Fisher MD, Tad Doherty MD, MPH      Caryle Bos, SOLE Pradhan, [facility]     April REINIER Kate, [facility]   China Pedersen, [facility]    Meena Burciaga [facility]       Charles Valdez [address]    at [facility]    [address], [address] [address]    [phone]    FAX: [address] [place] [address], [address]    [phone]    FAX: [phone]     Patient Care Team:  Mora Gillespie MD as PCP - General (Internal Medicine)  Margi Atkins MD as Physician (Obstetrics & Gynecology)  Kerry Westbrook MD (Breast Surgery)  Nury Martinez MD (Hematology and Oncology)  Ladonna Parker MD (Hepatology)    Problem List  Date Reviewed: 9/21/2021        Codes Class Noted    Autoimmune hepatitis [facility]) ICD-10-CM: K75.4  ICD-9-CM: 571.42  11/27/2020        Hypertension ICD-10-CM: E21  ICD-9-CM: 401.9  Unknown              Benjamin Hidalgo is being seen at [address] for management of Autoimmune Hepatitis. The active problem list, all pertinent past medical history, medications, liver histology, radiologic findings and laboratory findings related to the liver disorder were reviewed and discussed with the patient. The patient is a 77 y.o. Black female who was found to have elevated liver enzymes in the 2000 range and jaundice in 10/2020     Serologic evaluation for markers of chronic liver disease was positive for ASMA     A liver biopsy performed 10/2022 demonstrated severe chronic hepatitis with PMN, and stage 3 fibrosis consistent with AIH. She was treated with prednisone and the liver enzymes came down rapidly.   Azathioprine was added and the liver enzymes were normal in 1/2021. However, with continued tapering of prednisone below 20 mg the liver enzymes have relapsed. The patient is not experiencing the following symptoms of liver disease:  yellowing of the eyes or skin, itching, dark urine, problems concentrating, swelling of the abdomen, swelling of the lower extremities,     The patient completes all daily activities without any functional limitations. Since last clinic appointment  Patient was last seen in the clinic in July 2021  She remains on prednisone 10 mg and Azathioprine 50 mg daily  She feels overall better but still has hair loss. She has been on remission from autoimmune hepatitis since July 2021      ASSESSMENT AND PLAN:  Autoimmune hepatitis:   The diagnosis is based on liver biopsy, and serologies that is positive for ASMA    Liver biopsy demonstrates severe aggressive inflammation, severe interface activity, severe PMN, stage 3 bridging  Fibrosis. Given the severity of the inflammation the degree of fibrosis may be overestimated. The Fibroscan can assess liver fibrosis and determine if a patient has advanced fibrosis or cirrhosis without the need for liver biopsy. This should be performed after the patient has had normal liver enzymes for about 6 months so the inflammation does not overestimate the degree of fibrosis. The Fibroscan can be repeated annually or as often as clinically indicated to assess for fibrosis progression and/or regression. At diagnosis the liver enzymes were in the 2000 range    Patient was started on high-dose prednisone has induction and azathioprine was added  Prednisone has been tapered to 10 mg daily.   She is also on azathioprine 50 mg daily    She is in remission    We will begin taper of prednisone from 10 mg to 5 mg for another 2 weeks then 5 mg every other day for 2 weeks then stop  Continue azathioprine 50 mg daily  We will continue to check liver function tests monthly for now  FibroScan of the liver can be performed on next clinic visit    Elevation in Ferritin  There is an elevation in ferritin with Elevated iron saturation. HFE genetic testing was negative for any known mutations    The elevation in ferritin reflects hepatic inflammation and will decline back to normal with treatment of AIH. Hepatic lesion:   There is a 1.2 x 0.6 cm mass in the right lobe, segment 5. This has benign features on MRI and may represent a small sclerosed hemangioma. Liver ultrasound will be performed to monitor and determine that there is no change in size of segment 5 mass    Screening for Hepatocellular Carcinoma  HCC screening is not necessary if the patient has no evidence of cirrhosis. Imaging shows indeterminate hepatic lesions suggestive of sclerosing hemangioma  AFP was negative. CA 19-9 was normal.    Treatment of other medical problems in patients with chronic liver disease  There are no contraindications for the patient to take most medications that are necessary for treatment of other medical issues. Counseling for alcohol in patients with chronic liver disease  The patient does not consume any significant amount of alcohol. Vaccinations   Vaccination for viral hepatitis A is not needed. The patient has serologic evidence of prior exposure or vaccination with immunity  Routine vaccinations against other bacterial and viral agents can be performed as indicated. Annual flu vaccination should be administered if indicated.     Health maintenance  Variceal surveillance None  CRC screening:  [facility]: MRI 10/20: Small indeterminate hepatic lesion  Hepatitis A serology: Immune  Hepatitis B serology: Not determined  Influenza: Received 10/2020  Pneumonia: Prevnar followed by Pneumovax after 8 weeks    ALLERGIES  Allergies   Allergen Reactions    Sulfa (Sulfonamide Antibiotics) Unknown (comments)     Pt denies    Iodine Itching       MEDICATIONS  Current Outpatient Medications   Medication Sig    losartan (COZAAR) 25 mg tablet 12.5 mg.    zinc gluconate 30 mg tab Take 30 mg by mouth every other day.  predniSONE (DELTASONE) 5 mg tablet Take 1 Tablet by mouth daily.  L.acid/L.casei/B.bif/B.deyanira/FOS (PROBIOTIC BLEND PO) Take  by mouth.  azaTHIOprine (IMURAN) 50 mg tablet Take 2 Tabs by mouth daily. Indications: liver inflammation resulting from an abnormal immune response (Patient taking differently: Take 50 mg by mouth daily. 1 tablet once daily  Indications: liver inflammation resulting from an abnormal immune response)    omega-3-dha-epa-fish oil (Omega-3 Fish Oil) 910-1,400 mg cap Take  by mouth.  turmeric/turmeric ext/pepr ext (turmeric-turmeric ext-pepper) 500-3 mg cap Take  by mouth.  folic acid (FOLVITE) 1 mg tablet     calcium carbonate (CALTREX) 600 mg calcium (1,500 mg) tablet Take 600 mg by mouth two (2) times a day.  cholecalciferol (VITAMIN D3) (2,000 UNITS /50 MCG) cap capsule Take  by mouth two (2) times a day.  multivitamin (ONE A DAY) tablet Take 1 Tab by mouth daily.  losartan (COZAAR) 50 mg tablet Take 25 mg by mouth nightly. 1/2 tab = 25 mg  (Patient not taking: Reported on 9/21/2021)    peg 400-propylene glycol (Systane, propylene glycol,) 0.4-0.3 % drop Administer 1 Drop to both eyes as needed. (Patient not taking: Reported on 9/21/2021)     No current facility-administered medications for this visit. SYSTEM REVIEW NOT RELATED TO LIVER DISEASE OR REVIEWED ABOVE:  Constitution systems: Negative for fever, chills, weight gain, weight loss. Eyes: Negative for visual changes. ENT: Negative for sore throat, painful swallowing. Respiratory: Negative for cough, hemoptysis, SOB. Cardiology: Negative for chest pain, palpitations. GI:  Negative for constipation or diarrhea. : Negative for urinary frequency, dysuria, hematuria, nocturia. Skin: Negative for rash. Hematology: Negative for easy bruising, blood clots.     Musculo-skelatal: Positive for weakness and joint pains  Neurologic: Negative for headaches, dizziness, vertigo, memory problems not related to HE. Psychology: Negative for anxiety, depression. FAMILY HISTORY:  The father is .  of MI  The mother is .  of recurrent stroke  There is no family history of liver disease. The patient sister has SLE    SOCIAL HISTORY:  The patient is . The patient has 3 children and 9 grand children  The patient has never used tobacco products. The patient consumes alcohol on social occasions never in excess. The patient is retired. 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   No asterixis    LABORATORY STUDIES:    Liver [place] of [address] Units 2021 7/15/2021   WBC 3.4 - 10.8 x10E3/uL 5.8 5.7   ANC 1.4 - 7.0 x10E3/uL 3.2 2.4   HGB 11.1 - 15.9 g/dL 14.5 13.7    - 450 x10E3/uL 209 213   INR 0.9 - 1.2     AST 0 - 40 IU/L 21 24   ALT 0 - 32 IU/L 14 15   Alk Phos 44 - 121 IU/L 39 (L) 30 (L)   Bili, Total 0.0 - 1.2 mg/dL 0.4 0.3   Bili, Direct 0.00 - 0.40 mg/dL     Albumin 3.8 - 4.8 g/dL 4.2 4.0   BUN 8 - 27 mg/dL 10 8   Creat 0.57 - 1.00 mg/dL 0.85 0.79   Na 134 - 144 mmol/L 140 136   K 3.5 - 5.2 mmol/L 4.0 4.1   Cl 96 - 106 mmol/L 97 95 (L)   CO2 20 - 29 mmol/L 28 27   Glucose 65 - 99 mg/dL 73 79   Magnesium 1.6 - 2.4 mg/dL       SEROLOGIES:  Serologies Latest Ref Rng & Units 10/22/2020 10/20/2020   Hep B Surface Ag Index  <0.10   Hep B Surface Ag Interp NEG    Negative   Hep C Ab NR    NONREACTIVE   Ferritin 26 - 388 NG/ML 6,200 (H)    Iron % Saturation 20 - 50 % 93 (H)    ASMCA 0 - 19 Units 25 (H)    M2 Ab 0.0 - 20.0 Units <20.0    Ceruloplasmin 19.0 - 39.0 mg/dL 30.2      Serologies Latest Ref Rng & Units 1/8/2021   Hep A Ab, Total Negative Positive (A)     LIVER HISTOLOGY:  10/2020. Slides reviewed by MLS and NA. Chronic hepatitis. severe portal inflammation, with severe interface hepatitis, with severe PMN. severe lobular inflammation. NO steatosis. Sharmin stage 4 fibrosis. Metavir stage 3 fibrosis. Knodell score (1463). ENDOSCOPIC PROCEDURES:  Not available or performed    RADIOLOGY:  10/2020: MRI/MRCP liver: . Small indeterminate hepatic lesion is of questionable significance. A  sclerosing hemangioma is favored. 10/2020: CT scan WO Liver: Periportal edema vs intrahepatic biliary ductal dilation    OTHER TESTING:  Not available or performed      FOLLOW-UP:  All of the issues listed above in the Assessment and Plan were discussed with the patient. All questions were answered. The patient expressed a clear understanding of the above.     [address] [address] in 3 months for routine monitoring    Nadiya Su MD, MPH  [facility]  [address] [place] [address] [place] [address].  [place], [address] [place] [address] 22.  [phone]  [address] 1[address]76

## 2024-12-03 ENCOUNTER — TRANSCRIBE ORDERS (OUTPATIENT)
Facility: HOSPITAL | Age: 69
End: 2024-12-03

## 2024-12-03 DIAGNOSIS — R53.1 RIGHT SIDED WEAKNESS: ICD-10-CM

## 2024-12-03 DIAGNOSIS — R20.2 PARESTHESIA: Primary | ICD-10-CM

## 2024-12-05 ENCOUNTER — HOSPITAL ENCOUNTER (OUTPATIENT)
Facility: HOSPITAL | Age: 69
Discharge: HOME | End: 2024-12-08
Payer: MEDICARE

## 2024-12-05 DIAGNOSIS — R53.1 RIGHT SIDED WEAKNESS: ICD-10-CM

## 2024-12-05 DIAGNOSIS — R20.2 PARESTHESIA: ICD-10-CM

## 2024-12-05 PROCEDURE — 70551 MRI BRAIN STEM W/O DYE: CPT

## 2025-03-06 ENCOUNTER — OFFICE VISIT (OUTPATIENT)
Age: 70
End: 2025-03-06

## 2025-03-06 VITALS
DIASTOLIC BLOOD PRESSURE: 72 MMHG | RESPIRATION RATE: 16 BRPM | WEIGHT: 159 LBS | HEIGHT: 65 IN | BODY MASS INDEX: 26.49 KG/M2 | OXYGEN SATURATION: 96 % | HEART RATE: 76 BPM | TEMPERATURE: 98.2 F | SYSTOLIC BLOOD PRESSURE: 138 MMHG

## 2025-03-06 DIAGNOSIS — H66.002 NON-RECURRENT ACUTE SUPPURATIVE OTITIS MEDIA OF LEFT EAR WITHOUT SPONTANEOUS RUPTURE OF TYMPANIC MEMBRANE: Primary | ICD-10-CM

## 2025-03-06 DIAGNOSIS — R03.0 ELEVATED BLOOD PRESSURE READING: ICD-10-CM

## 2025-03-06 ASSESSMENT — ENCOUNTER SYMPTOMS
EYES NEGATIVE: 1
ALLERGIC/IMMUNOLOGIC NEGATIVE: 1
GASTROINTESTINAL NEGATIVE: 1
RESPIRATORY NEGATIVE: 1

## 2025-03-06 NOTE — PROGRESS NOTES
3/6/2025   Carlos Alberto Landry (: 1955) is a 69 y.o. female, New patient, here for evaluation of the following chief complaint(s):  Ear Pain (Pt c/o possible left ear infection, this started last night. But she has not been feeling ok since she had the flu 2 weeks ago.)     ASSESSMENT/PLAN:  Below is the assessment and plan developed based on review of pertinent history, physical exam, labs, studies, and medications.       Assessment & Plan  Non-recurrent acute suppurative otitis media of left ear without spontaneous rupture of tympanic membrane  Patient is previously healthy and immunocompetent, presenting with otitis media.  There is no evidence for significant complications at this time.  Patient is alert and completely non-toxic.  There is no evidence for tympanic membrane rupture.   There is no mastoid swelling.  There is no evidence for otitis externa based on external examination.  There is no suggestion of systemic complications or sepsis.  I feel a trial of outpatient antibiotics is warranted at this time.  Patient agrees to follow up with PCP, ENT or the ER if symptoms worsen in any way, or develops any new symptoms.     Orders:    amoxicillin-clavulanate (AUGMENTIN) 875-125 MG per tablet; Take 1 tablet by mouth 2 times daily for 7 days    Elevated blood pressure reading  DASH diet and follow up with PCP   Orders:    Ambulatory referral to Internal Medicine      Handout given with care instructions  OTC for symptom management. Increase fluid intake, ensure adequate nutritional intake.  Follow up with PCP as needed.  Go to ED with development of any acute symptoms.     Follow up:  No follow-ups on file.  Follow up immediately for any new, worsening or changes or if symptoms are not improving over the next 5-7 days.     SUBJECTIVE/OBJECTIVE:    History provided by:  Patient   used: No    Ear Pain            Ear Pain (Pt c/o possible left ear infection, this started last night. But

## 2025-03-06 NOTE — PATIENT INSTRUCTIONS
[email or web address] and enter V843 to learn more about \"Low Sodium Diet (2,000 Milligram): Care Instructions.\"  Current as of: September 20, 2023  Content Version: 14.3  © 2024 [employer].   Care instructions adapted under license by [employer]. If you have questions about a medical condition or this instruction, always ask your healthcare professional. [employer], [employer], disclaims any warranty or liability for your use of this information.

## 2025-03-14 ENCOUNTER — OFFICE VISIT (OUTPATIENT)
Age: 70
End: 2025-03-14

## 2025-03-14 ENCOUNTER — RESULTS FOLLOW-UP (OUTPATIENT)
Age: 70
End: 2025-03-14

## 2025-03-14 VITALS
HEART RATE: 69 BPM | SYSTOLIC BLOOD PRESSURE: 146 MMHG | DIASTOLIC BLOOD PRESSURE: 63 MMHG | BODY MASS INDEX: 26.76 KG/M2 | WEIGHT: 160.8 LBS

## 2025-03-14 DIAGNOSIS — Z01.419 ENCOUNTER FOR GYNECOLOGICAL EXAMINATION: Primary | ICD-10-CM

## 2025-03-14 NOTE — PROGRESS NOTES
Carlos Alberto Landry is a 69 y.o. female returns for an annual exam     Chief Complaint   Patient presents with    Annual Exam     Mammogram   ABN    No LMP recorded. Patient is postmenopausal.  Her periods are none.   Problems: pt reports taking abx last week for an ear infection, pt reports she noticed some vulvar bumps that have gotten better now.   Birth Control: tubal ligation and post menopausal status.  Last Pap: normal obtained 3/2024. NEGATIVE FOR INTRAEPITHELIAL LESION OR MALIGNANCY. CELLULAR CHANGES ASSOCIATED WITH ATROPHY ARE PRESENT.   She does not have a history of ELMA 2, 3 or cervical cancer.   Last Mammogram: had her mammogram today in our office.   Last Bone Density: 2022 per pt, osteopenia   Last colonoscopy: 2023 per pt    Examination chaperoned by Viktoria Pickett MA.

## 2025-03-14 NOTE — PROGRESS NOTES
[facility]  [phone]    Lizzeth Ramirez MD, FACOG        Annual Gynecologic Exam:  Chief Complaint   Patient presents with    Annual Exam     Mammogram       Orisaacbinh Landry is a No obstetric history on file.,  69 y.o. female   No LMP recorded. Patient is postmenopausal.    The patient presents for her annual gynecologic checkup.     The patient is having problems - vulvar lesions/perineal lesions resolving.    History of Present Illness  The patient presents for evaluation of perineal bumps.    She reports the development of small, fine bumps in the perineal area, which she attributes to a recent course of potent antibiotics prescribed for an ear infection. She has not experienced any itching or other symptoms associated with these bumps. The bumps have almost completely resolved. She has not had any sexual activity with her  for the past 3 weeks due to her illness. She has no history of similar symptoms. She has been applying an antibacterial cream to the affected area as a precautionary measure.      The patient (or guardian, if applicable) and other individuals in attendance with the patient were advised that Artificial Intelligence will be utilized during this visit to record, process the conversation to generate a clinical note, and support improvement of the AI technology. The patient (or guardian, if applicable) and other individuals in attendance at the appointment consented to the use of AI, including the recording.                    Per Rooming Note:  No LMP recorded. Patient is postmenopausal.  Her periods are none.   Problems: pt reports taking abx last week for an ear infection, pt reports she noticed some vulvar bumps that have gotten better now.   Birth Control: tubal ligation and post menopausal status.  Last Pap: normal obtained 3/2024. NEGATIVE FOR INTRAEPITHELIAL LESION OR MALIGNANCY. CELLULAR CHANGES ASSOCIATED WITH ATROPHY ARE PRESENT.   She does not have a

## 2025-06-27 NOTE — PROGRESS NOTES
No Patient c/o dizzy spell when standing up in room that lasted about 7 minutes. States dizzy spell resolved when she sat down in bed. /83. HR 67 Pulse ox 100% room air. Dizziness resolved upon assessment. Md notified.

## (undated) DEVICE — SNARE ENDOSCP M L240CM W27MM SHTH DIA2.4MM CHN 2.8MM OVL

## (undated) DEVICE — CONTAINER SPEC 20 ML LID NEUT BUFF FORMALIN 10 % POLYPR STS

## (undated) DEVICE — Device

## (undated) DEVICE — BAG SPEC BIOHZRD 10 X 10 IN --

## (undated) DEVICE — CANN NASAL O2 CAPNOGRAPHY AD -- FILTERLINE

## (undated) DEVICE — 3M™ CUROS™ DISINFECTING CAP FOR NEEDLELESS CONNECTORS 270/CARTON 20 CARTONS/CASE CFF1-270: Brand: CUROS™

## (undated) DEVICE — CATH IV AUTOGRD BC BLU 22GA 25 -- INSYTE

## (undated) DEVICE — POLYP TRAP: Brand: TRAPEASE®

## (undated) DEVICE — 1200 GUARD II KIT W/5MM TUBE W/O VAC TUBE: Brand: GUARDIAN

## (undated) DEVICE — SET GRAV CK VLV NEEDLESS ST 3 GANGED 4WAY STPCOCK HI FLO 10

## (undated) DEVICE — KIT COLON W/ 1.1OZ LUB AND 2 END

## (undated) DEVICE — KENDALL RADIOLUCENT FOAM MONITORING ELECTRODE -RECTANGULAR SHAPE: Brand: KENDALL

## (undated) DEVICE — ADULT SPO2 SENSOR: Brand: NELLCOR

## (undated) DEVICE — BAG BELONG PT PERS CLEAR HANDL

## (undated) DEVICE — SIMPLICITY FLUFF UNDERPAD 23X36, MODERATE: Brand: SIMPLICITY

## (undated) DEVICE — SYRINGE 50ML E/T

## (undated) DEVICE — SOLIDIFIER MEDC 1200ML -- CONVERT TO 356117